# Patient Record
Sex: FEMALE | Race: OTHER | Employment: UNEMPLOYED | URBAN - METROPOLITAN AREA
[De-identification: names, ages, dates, MRNs, and addresses within clinical notes are randomized per-mention and may not be internally consistent; named-entity substitution may affect disease eponyms.]

---

## 2017-11-28 ENCOUNTER — HOSPITAL ENCOUNTER (EMERGENCY)
Age: 57
Discharge: HOME | End: 2017-11-28
Attending: STUDENT IN AN ORGANIZED HEALTH CARE EDUCATION/TRAINING PROGRAM
Payer: SUBSIDIZED

## 2017-11-28 ENCOUNTER — APPOINTMENT (OUTPATIENT)
Dept: GENERAL RADIOLOGY | Age: 57
End: 2017-11-28
Attending: STUDENT IN AN ORGANIZED HEALTH CARE EDUCATION/TRAINING PROGRAM
Payer: SUBSIDIZED

## 2017-11-28 VITALS
HEIGHT: 57 IN | RESPIRATION RATE: 16 BRPM | TEMPERATURE: 98.6 F | BODY MASS INDEX: 32.79 KG/M2 | DIASTOLIC BLOOD PRESSURE: 75 MMHG | HEART RATE: 75 BPM | WEIGHT: 152 LBS | SYSTOLIC BLOOD PRESSURE: 177 MMHG | OXYGEN SATURATION: 100 %

## 2017-11-28 DIAGNOSIS — S16.1XXA STRAIN OF NECK MUSCLE, INITIAL ENCOUNTER: Primary | ICD-10-CM

## 2017-11-28 LAB
ALBUMIN SERPL-MCNC: 3.1 G/DL (ref 3.5–5)
ALBUMIN/GLOB SERPL: 0.7 {RATIO} (ref 1.1–2.2)
ALP SERPL-CCNC: 51 U/L (ref 45–117)
ALT SERPL-CCNC: 38 U/L (ref 12–78)
ANION GAP SERPL CALC-SCNC: 8 MMOL/L (ref 5–15)
APPEARANCE UR: CLEAR
AST SERPL-CCNC: 27 U/L (ref 15–37)
BACTERIA URNS QL MICRO: NEGATIVE /HPF
BASOPHILS # BLD: 0.1 K/UL (ref 0–0.1)
BASOPHILS NFR BLD: 1 % (ref 0–1)
BILIRUB SERPL-MCNC: 0.4 MG/DL (ref 0.2–1)
BILIRUB UR QL: NEGATIVE
BUN SERPL-MCNC: 12 MG/DL (ref 6–20)
BUN/CREAT SERPL: 14 (ref 12–20)
CALCIUM SERPL-MCNC: 8.9 MG/DL (ref 8.5–10.1)
CHLORIDE SERPL-SCNC: 88 MMOL/L (ref 97–108)
CO2 SERPL-SCNC: 26 MMOL/L (ref 21–32)
COLOR UR: ABNORMAL
CREAT SERPL-MCNC: 0.86 MG/DL (ref 0.55–1.02)
EOSINOPHIL # BLD: 0.3 K/UL (ref 0–0.4)
EOSINOPHIL NFR BLD: 3 % (ref 0–7)
EPITH CASTS URNS QL MICRO: ABNORMAL /LPF
ERYTHROCYTE [DISTWIDTH] IN BLOOD BY AUTOMATED COUNT: 12.1 % (ref 11.5–14.5)
GLOBULIN SER CALC-MCNC: 4.3 G/DL (ref 2–4)
GLUCOSE SERPL-MCNC: 203 MG/DL (ref 65–100)
GLUCOSE UR STRIP.AUTO-MCNC: NEGATIVE MG/DL
HCT VFR BLD AUTO: 29.5 % (ref 35–47)
HGB BLD-MCNC: 10.5 G/DL (ref 11.5–16)
HGB UR QL STRIP: ABNORMAL
HYALINE CASTS URNS QL MICRO: ABNORMAL /LPF (ref 0–5)
KETONES UR QL STRIP.AUTO: NEGATIVE MG/DL
LEUKOCYTE ESTERASE UR QL STRIP.AUTO: NEGATIVE
LYMPHOCYTES # BLD: 3.8 K/UL (ref 0.8–3.5)
LYMPHOCYTES NFR BLD: 39 % (ref 12–49)
MCH RBC QN AUTO: 27.6 PG (ref 26–34)
MCHC RBC AUTO-ENTMCNC: 35.6 G/DL (ref 30–36.5)
MCV RBC AUTO: 77.6 FL (ref 80–99)
MONOCYTES # BLD: 0.7 K/UL (ref 0–1)
MONOCYTES NFR BLD: 7 % (ref 5–13)
NEUTS SEG # BLD: 5 K/UL (ref 1.8–8)
NEUTS SEG NFR BLD: 50 % (ref 32–75)
NITRITE UR QL STRIP.AUTO: NEGATIVE
PH UR STRIP: 6.5 [PH] (ref 5–8)
PLATELET # BLD AUTO: 300 K/UL (ref 150–400)
POTASSIUM SERPL-SCNC: 4.2 MMOL/L (ref 3.5–5.1)
PROT SERPL-MCNC: 7.4 G/DL (ref 6.4–8.2)
PROT UR STRIP-MCNC: 100 MG/DL
RBC # BLD AUTO: 3.8 M/UL (ref 3.8–5.2)
RBC #/AREA URNS HPF: ABNORMAL /HPF (ref 0–5)
SODIUM SERPL-SCNC: 122 MMOL/L (ref 136–145)
SP GR UR REFRACTOMETRY: 1.01 (ref 1–1.03)
UROBILINOGEN UR QL STRIP.AUTO: 1 EU/DL (ref 0.2–1)
WBC # BLD AUTO: 9.8 K/UL (ref 3.6–11)
WBC URNS QL MICRO: ABNORMAL /HPF (ref 0–4)

## 2017-11-28 PROCEDURE — 71020 XR CHEST PA LAT: CPT

## 2017-11-28 PROCEDURE — 96372 THER/PROPH/DIAG INJ SC/IM: CPT

## 2017-11-28 PROCEDURE — 81001 URINALYSIS AUTO W/SCOPE: CPT | Performed by: STUDENT IN AN ORGANIZED HEALTH CARE EDUCATION/TRAINING PROGRAM

## 2017-11-28 PROCEDURE — 85025 COMPLETE CBC W/AUTO DIFF WBC: CPT | Performed by: STUDENT IN AN ORGANIZED HEALTH CARE EDUCATION/TRAINING PROGRAM

## 2017-11-28 PROCEDURE — 99283 EMERGENCY DEPT VISIT LOW MDM: CPT

## 2017-11-28 PROCEDURE — 74011250636 HC RX REV CODE- 250/636: Performed by: STUDENT IN AN ORGANIZED HEALTH CARE EDUCATION/TRAINING PROGRAM

## 2017-11-28 PROCEDURE — 36415 COLL VENOUS BLD VENIPUNCTURE: CPT | Performed by: STUDENT IN AN ORGANIZED HEALTH CARE EDUCATION/TRAINING PROGRAM

## 2017-11-28 PROCEDURE — 80053 COMPREHEN METABOLIC PANEL: CPT | Performed by: STUDENT IN AN ORGANIZED HEALTH CARE EDUCATION/TRAINING PROGRAM

## 2017-11-28 RX ORDER — INSULIN ASPART 100 [IU]/ML
INJECTION, SOLUTION INTRAVENOUS; SUBCUTANEOUS
COMMUNITY
End: 2018-02-07

## 2017-11-28 RX ORDER — KETOROLAC TROMETHAMINE 30 MG/ML
30 INJECTION, SOLUTION INTRAMUSCULAR; INTRAVENOUS
Status: COMPLETED | OUTPATIENT
Start: 2017-11-28 | End: 2017-11-28

## 2017-11-28 RX ORDER — VALSARTAN AND HYDROCHLOROTHIAZIDE 160; 12.5 MG/1; MG/1
1 TABLET, FILM COATED ORAL DAILY
COMMUNITY
End: 2018-02-10

## 2017-11-28 RX ORDER — NAPROXEN 500 MG/1
500 TABLET ORAL 2 TIMES DAILY WITH MEALS
Qty: 20 TAB | Refills: 0 | Status: SHIPPED | OUTPATIENT
Start: 2017-11-28 | End: 2017-12-08

## 2017-11-28 RX ADMIN — KETOROLAC TROMETHAMINE 30 MG: 30 INJECTION, SOLUTION INTRAMUSCULAR at 13:08

## 2017-11-28 NOTE — DISCHARGE INSTRUCTIONS
Distensión del lissett: Instrucciones de cuidado - [ Neck Strain: Care Instructions ]  Instrucciones de cuidado    Usted delatorre tenido skyla distensión de los músculos y los ligamentos del lissett. Cualquier movimiento repentino y fuera de lo común puede distender el lissett. [place] con frecuencia ocurre en las caídas o accidentes automovilísticos, o mariya ciertos deportes. Las actividades diarias aggie trabajar con skyla computadora o dormir también pueden causar skyla distensión si lo obligan a tener el lissett en skyla posición incómoda por [employer]. Es común que el dolor de lissett empeore mariya sukhi o dos días después de skyla Faythe Silence, valentine luego debería empezar a mejorar. Brendan vez sienta más dolor y rigidez por varios días antes de que mejore. [place] es normal. Podría tardar unas cuantas semanas o más tiempo para sanar por completo. Un buen tratamiento en el hogar puede ayudarle a mejorar más rápido y evitar futuros problemas del lissett. La atención de seguimiento es skyla parte clave de lopez tratamiento y seguridad. Asegúrese de hacer y acudir a todas las citas, y llame a lopez médico si está teniendo problemas. También es skyla buena idea saber los resultados de los exámenes y mantener skyla lista de los medicamentos que tho. ¿Cómo puede cuidarse en el hogar? · Si le dieron un aparato ortopédico para el lissett (collarín) para limitar lopez movimiento, úselo mariya la cantidad de días que lopez médico le haya indicado. No lo use mariya más tiempo de lo que [employer] Usar un aparato ortopédico por demasiado tiempo puede empeorar la rigidez y debilitar los músculos del lissett. · Puede probar a usar calor o frío para gordo si ayuda. ¨ Pruebe a usar skyla almohadilla térmica a temperatura baja o media por entre 15 y 21 minutos cada 2 o 3 horas. Pruebe con [place] ducha tibia en vez de skyla sesión con la almohadilla térmica. También puede comprar envolturas calientes desechables que mckeon hasta 8 horas.   ¨ También puede probar con [place] compresa de hielo por entre 10 y 15 minutos cada 2 o 3 horas. · [employer] analgésicos (medicamentos para el dolor) exactamente según las indicaciones. ¨ Si el médico le recetó un analgésico, tómelo según las indicaciones. ¨ Si no está tomando un analgésico recetado, pregúntele a lopez médico si puede terry sukhi de [employer]. · Masajee la red con suavidad para aliviar el dolor y ayudar al flujo de [place]. No se masajee la red si al [employer]. · No tj nada que empeore el dolor. Tómelo con calma por un par de días. Puede hacer christelle actividades habituales si no le hacen doler el lissett ni lo ponen en riesgo de mayor estrés o lesiones. · Trate de dormir con skyla almohada especial para el lissett. Colóquela debajo del lissett, no debajo de la rachel. También funciona colocar skyla toalla firmemente enrollada debajo del lissett mientras duerme. [place] skyla almohada cervical o skyla toalla enrollada, no use lopez almohada normal al mismo tiempo. · Para prevenir el dolor de lissett en el futuro, tj ejercicios para estirar y fortalecer el lissett y la espalda. Aprenda a [place], técnicas seguras para levantar peso y la mecánica corporal apropiada. ¿Cuándo debe pedir ayuda? Llame al 911 en cualquier momento que considere que necesita atención de [place]. Por ejemplo, llame si:  ? · Es completamente incapaz de  un brazo o skyla pierna. ?Llame a lopez médico ahora mismo o busque atención médica inmediata si:  ? · Usted tiene síntomas nuevos o peores en los brazos, las piernas, el pecho, el abdomen o las nalgas (glúteos). Los síntomas pueden incluir:  ¨ Entumecimiento u hormigueo. ¨ Debilidad. ¨ Dolor. ? · Pierde el control de la vejiga o del intestino. ?Preste especial atención a los cambios en lopez rd y asegúrese de comunicarse con lopez médico si:  ? · No mejora aggie se esperaba. ¿Dónde puede encontrar más información en inglés? Payam Lyle a [email or web address].[email or web address]   Glory Kim M253 en la búsqueda para aprender más acerca de \"Distensión del lissett: Instrucciones de cuidado - [ Neck Strain: Care Instructions ]. \"  Revisado: 21 marzo, 2017  Versión del contenido: 11.4  © 4356-1701 Healthwise, Incorporated. Las instrucciones de cuidado fueron adaptadas bajo licencia por [employer] (which disclaims liability or warranty for this information). Si usted tiene [place] afección médica o sobre estas instrucciones, siempre pregunte a lopez profesional de rd. Healthwise, Incorporated niega toda garantía o responsabilidad por lopez uso de esta información.

## 2017-11-28 NOTE — ED TRIAGE NOTES
\"She has depression and she's nervous. She can't sleep and she has neck and back pain. \" Patient has hx of depression. Patient denies any suicidal ideation.

## 2017-11-28 NOTE — ED NOTES
Pt and family was educated on DC instructions per physician. Pt and family member stated understanding. PT was able to ambulate to lobby with the help of her family member. Family member stated they had all belongings.

## 2017-11-29 NOTE — ED PROVIDER NOTES
HPI Comments: Ms. Krishna Herrera is a 61 y/o female presenting to ED for neck pain, shoulder pain, and \"nerve\" pain. HPi is very difficult to understand even with use of interpretor as pt. Just immigrated to [facility] from [place]. There was confusion initially as she stated \"my nerves are depressed. \"  She denies any depression, mental health disorders, only has hx of IDDM. She has been having \"bilateral nerve pain. \"  Pt. Has been also complaining of neck and shoulder pain. She denies any trauma, does not have PCP. She denies fevers, chills, rashes, SOB, CP, abd pain, weight gain/loss, night sweats. Patient is a 62 y.o. female presenting with insomnia and back pain. The history is provided by the patient and a relative. The history is limited by a language barrier. A  was used. Insomnia    Pertinent negatives include no numbness. Back Pain    Pertinent negatives include no chest pain, no fever, no numbness, no headaches, no abdominal pain and no dysuria. Past Medical History:   Diagnosis Date    Diabetes ([address]     Hypertension        Past Surgical History:   Procedure Laterality Date    HX BREAST LUMPECTOMY Left          History reviewed. No pertinent family history. Social History     Social History    Marital status: SINGLE     Spouse name: N/A    Number of children: N/A    Years of education: N/A     Occupational History    Not on file. Social History Main Topics    Smoking status: Never Smoker    Smokeless tobacco: Never Used    Alcohol use No    Drug use: No    Sexual activity: Not on file     Other Topics Concern    Not on file     Social History Narrative    No narrative on file         ALLERGIES: Review of patient's allergies indicates no known allergies. Review of Systems   Constitutional: Negative for activity change, diaphoresis, fatigue and fever. HENT: Negative for congestion and sore throat. Eyes: Negative for photophobia and visual disturbance. Respiratory: Negative for chest tightness and shortness of breath. Cardiovascular: Negative for chest pain, palpitations and leg swelling. Gastrointestinal: Negative for abdominal pain, blood in stool, constipation, diarrhea, nausea and vomiting. Genitourinary: Negative for difficulty urinating, dysuria, flank pain, frequency and hematuria. Musculoskeletal: Positive for back pain. Neurological: Negative for dizziness, syncope, numbness and headaches. Psychiatric/Behavioral: The patient has insomnia. Vitals:    11/28/17 1214 11/28/17 1248 11/28/17 1433 11/28/17 1434   BP: 171/82 189/84  177/75   Pulse: 82  75    Resp: 18  16    Temp: 98.6 °F (37 °C)      SpO2: 97% 99% 100%    Weight: 68.9 kg (152 lb)      Height: 4' 9.09\" (1.45 m)               Physical Exam   Constitutional: She is oriented to person, place, and time. She appears well-developed and well-nourished. No distress. HENT:   Head: Normocephalic and atraumatic. Nose: Nose normal.   Mouth/Throat: Oropharynx is clear and moist. No oropharyngeal exudate. Eyes: Conjunctivae and EOM are normal. Right eye exhibits no discharge. Left eye exhibits no discharge. No scleral icterus. Neck: Normal range of motion. Neck supple. No JVD present. No tracheal deviation present. No thyromegaly present. Cardiovascular: Normal rate, regular rhythm, normal heart sounds and intact distal pulses. Exam reveals no gallop and no friction rub. No murmur heard. Pulmonary/Chest: Effort normal and breath sounds normal. No stridor. No respiratory distress. She has no wheezes. She has no rales. She exhibits no tenderness. Abdominal: Bowel sounds are normal. She exhibits no distension and no mass. There is no tenderness. There is no rebound. Musculoskeletal: Normal range of motion. She exhibits tenderness. She exhibits no edema. Trapezius and bilateral para cervical tenderness. Lymphadenopathy:     She has no cervical adenopathy.    Neurological: She is alert and oriented to person, place, and time. No cranial nerve deficit. Coordination normal.   Skin: Skin is warm and dry. No rash noted. She is not diaphoretic. No erythema. No pallor. Psychiatric: She has a normal mood and affect. Her behavior is normal. Judgment and thought content normal.   Nursing note and vitals reviewed. MDM  Number of Diagnoses or Management Options  Strain of neck muscle, initial encounter:      Amount and/or Complexity of Data Reviewed  Clinical lab tests: ordered and reviewed  Tests in the radiology section of CPT®: ordered and reviewed  Obtain history from someone other than the patient: yes    Risk of Complications, Morbidity, and/or Mortality  Presenting problems: moderate  Diagnostic procedures: moderate  Management options: moderate    Patient Progress  Patient progress: stable    ED Course       Procedures    4:56 PM  The patient has been reevaluated. The patient is ready for discharge. The patient's signs, symptoms, diagnosis, and discharge instructions have been discussed and the patient/ family has conveyed their understanding. The patient is to follow up as recommended or return to the ED should their symptoms worsen. Plan has been discussed and the patient is in agreement. LABORATORY TESTS:  No results found for this or any previous visit (from the past 12 hour(s)). IMAGING RESULTS:  XR CHEST PA LAT   Final Result        No results found. MEDICATIONS GIVEN:  Medications   ketorolac (TORADOL) injection 30 mg (30 mg IntraMUSCular Given 11/28/17 1308)       IMPRESSION:  1. Strain of neck muscle, initial encounter        PLAN:  1. Discharge Medication List as of 11/28/2017  2:30 PM      START taking these medications    Details   naproxen (NAPROSYN) 500 mg tablet Take 1 Tab by mouth two (2) times daily (with meals) for 10 days. , Print, Disp-20 Tab, R-0         CONTINUE these medications which have NOT CHANGED    Details   insulin aspart (NOVOLOG) 100 unit/mL injection by SubCUTAneous route Before breakfast, lunch, dinner and at bedtime. , Historical Med      valsartan-hydroCHLOROthiazide (DIOVAN-HCT) 160-12.5 mg per tablet Take 1 Tab by mouth daily. , Historical Med           2.    Follow-up Information     Follow up With Details Comments Contact Info    [facility] EMERGENCY DEPT  If symptoms worsen [address] [facility]  [phone]            Return to ED for new or worsening symptoms       Sara Coffman MD

## 2018-02-07 ENCOUNTER — HOSPITAL ENCOUNTER (INPATIENT)
Age: 58
LOS: 3 days | Discharge: HOME | DRG: 645 | End: 2018-02-10
Attending: EMERGENCY MEDICINE | Admitting: INTERNAL MEDICINE
Payer: SUBSIDIZED

## 2018-02-07 ENCOUNTER — APPOINTMENT (OUTPATIENT)
Dept: CT IMAGING | Age: 58
DRG: 645 | End: 2018-02-07
Attending: INTERNAL MEDICINE
Payer: SUBSIDIZED

## 2018-02-07 ENCOUNTER — APPOINTMENT (OUTPATIENT)
Dept: GENERAL RADIOLOGY | Age: 58
DRG: 645 | End: 2018-02-07
Attending: INTERNAL MEDICINE
Payer: SUBSIDIZED

## 2018-02-07 DIAGNOSIS — E87.1 HYPONATREMIA: Primary | ICD-10-CM

## 2018-02-07 PROBLEM — I10 HTN (HYPERTENSION), BENIGN: Status: ACTIVE | Noted: 2018-02-07

## 2018-02-07 PROBLEM — M54.2 NECK PAIN: Status: ACTIVE | Noted: 2018-02-07

## 2018-02-07 PROBLEM — E11.9 TYPE 2 DIABETES MELLITUS WITHOUT COMPLICATION (HCC): Status: ACTIVE | Noted: 2018-02-07

## 2018-02-07 PROBLEM — D72.829 LEUKOCYTOSIS: Status: ACTIVE | Noted: 2018-02-07

## 2018-02-07 PROBLEM — R51.9 HEADACHE: Status: ACTIVE | Noted: 2018-02-07

## 2018-02-07 LAB
ALBUMIN SERPL-MCNC: 3.2 G/DL (ref 3.5–5)
ALBUMIN SERPL-MCNC: 3.3 G/DL (ref 3.5–5)
ALBUMIN/GLOB SERPL: 0.7 {RATIO} (ref 1.1–2.2)
ALP SERPL-CCNC: 63 U/L (ref 45–117)
ALT SERPL-CCNC: 41 U/L (ref 12–78)
ANION GAP SERPL CALC-SCNC: 11 MMOL/L (ref 5–15)
ANION GAP SERPL CALC-SCNC: 11 MMOL/L (ref 5–15)
ANION GAP SERPL CALC-SCNC: 7 MMOL/L (ref 5–15)
ANION GAP SERPL CALC-SCNC: 8 MMOL/L (ref 5–15)
APPEARANCE UR: CLEAR
AST SERPL-CCNC: 32 U/L (ref 15–37)
ATRIAL RATE: 80 BPM
BACTERIA URNS QL MICRO: NEGATIVE /HPF
BASOPHILS # BLD: 0.1 K/UL (ref 0–0.1)
BASOPHILS NFR BLD: 1 % (ref 0–1)
BILIRUB SERPL-MCNC: 0.4 MG/DL (ref 0.2–1)
BILIRUB UR QL: NEGATIVE
BNP SERPL-MCNC: 92 PG/ML (ref 0–125)
BUN SERPL-MCNC: 13 MG/DL (ref 6–20)
BUN SERPL-MCNC: 14 MG/DL (ref 6–20)
BUN SERPL-MCNC: 14 MG/DL (ref 6–20)
BUN SERPL-MCNC: 15 MG/DL (ref 6–20)
BUN/CREAT SERPL: 17 (ref 12–20)
BUN/CREAT SERPL: 17 (ref 12–20)
BUN/CREAT SERPL: 18 (ref 12–20)
BUN/CREAT SERPL: 19 (ref 12–20)
CALCIUM SERPL-MCNC: 8.5 MG/DL (ref 8.5–10.1)
CALCIUM SERPL-MCNC: 8.9 MG/DL (ref 8.5–10.1)
CALCULATED P AXIS, ECG09: 47 DEGREES
CALCULATED R AXIS, ECG10: 11 DEGREES
CALCULATED T AXIS, ECG11: 41 DEGREES
CHLORIDE SERPL-SCNC: 82 MMOL/L (ref 97–108)
CHLORIDE SERPL-SCNC: 87 MMOL/L (ref 97–108)
CK MB CFR SERPL CALC: 1 % (ref 0–2.5)
CK MB SERPL-MCNC: 2.9 NG/ML (ref 5–25)
CK SERPL-CCNC: 288 U/L (ref 26–192)
CO2 SERPL-SCNC: 21 MMOL/L (ref 21–32)
CO2 SERPL-SCNC: 23 MMOL/L (ref 21–32)
CO2 SERPL-SCNC: 26 MMOL/L (ref 21–32)
CO2 SERPL-SCNC: 27 MMOL/L (ref 21–32)
COLOR UR: ABNORMAL
CORTIS SERPL-MCNC: 22.5 UG/DL
CREAT SERPL-MCNC: 0.77 MG/DL (ref 0.55–1.02)
CREAT SERPL-MCNC: 0.78 MG/DL (ref 0.55–1.02)
CREAT SERPL-MCNC: 0.79 MG/DL (ref 0.55–1.02)
CREAT SERPL-MCNC: 0.83 MG/DL (ref 0.55–1.02)
CREAT UR-MCNC: 18.99 MG/DL
DIAGNOSIS, 93000: NORMAL
DIFFERENTIAL METHOD BLD: ABNORMAL
EOSINOPHIL # BLD: 0.8 K/UL (ref 0–0.4)
EOSINOPHIL NFR BLD: 6 % (ref 0–7)
EPITH CASTS URNS QL MICRO: ABNORMAL /LPF
ERYTHROCYTE [DISTWIDTH] IN BLOOD BY AUTOMATED COUNT: 11.7 % (ref 11.5–14.5)
EST. AVERAGE GLUCOSE BLD GHB EST-MCNC: 166 MG/DL
GLOBULIN SER CALC-MCNC: 4.3 G/DL (ref 2–4)
GLUCOSE BLD STRIP.AUTO-MCNC: 133 MG/DL (ref 65–100)
GLUCOSE BLD STRIP.AUTO-MCNC: 142 MG/DL (ref 65–100)
GLUCOSE SERPL-MCNC: 117 MG/DL (ref 65–100)
GLUCOSE SERPL-MCNC: 118 MG/DL (ref 65–100)
GLUCOSE SERPL-MCNC: 126 MG/DL (ref 65–100)
GLUCOSE SERPL-MCNC: 217 MG/DL (ref 65–100)
GLUCOSE UR STRIP.AUTO-MCNC: NEGATIVE MG/DL
HBA1C MFR BLD: 7.4 % (ref 4.2–6.3)
HCT VFR BLD AUTO: 31.4 % (ref 35–47)
HGB BLD-MCNC: 11.5 G/DL (ref 11.5–16)
HGB UR QL STRIP: NEGATIVE
HYALINE CASTS URNS QL MICRO: ABNORMAL /LPF (ref 0–5)
IMM GRANULOCYTES # BLD: 0.1 K/UL (ref 0–0.04)
IMM GRANULOCYTES NFR BLD AUTO: 1 % (ref 0–0.5)
KETONES UR QL STRIP.AUTO: NEGATIVE MG/DL
LACTATE SERPL-SCNC: 0.6 MMOL/L (ref 0.4–2)
LEUKOCYTE ESTERASE UR QL STRIP.AUTO: NEGATIVE
LIPASE SERPL-CCNC: 151 U/L (ref 73–393)
LYMPHOCYTES # BLD: 3 K/UL (ref 0.8–3.5)
LYMPHOCYTES NFR BLD: 24 % (ref 12–49)
MCH RBC QN AUTO: 28.9 PG (ref 26–34)
MCHC RBC AUTO-ENTMCNC: 36.6 G/DL (ref 30–36.5)
MCV RBC AUTO: 78.9 FL (ref 80–99)
MONOCYTES # BLD: 0.8 K/UL (ref 0–1)
MONOCYTES NFR BLD: 6 % (ref 5–13)
NEUTS SEG # BLD: 7.7 K/UL (ref 1.8–8)
NEUTS SEG NFR BLD: 62 % (ref 32–75)
NITRITE UR QL STRIP.AUTO: NEGATIVE
NRBC # BLD: 0 K/UL (ref 0–0.01)
NRBC BLD-RTO: 0 PER 100 WBC
OSMOLALITY SERPL: 260 MOSM/KG H2O
OSMOLALITY UR: 192 MOSM/KG H2O
OSMOLALITY UR: 214 MOSM/KG H2O
P-R INTERVAL, ECG05: 176 MS
PH UR STRIP: 7.5 [PH] (ref 5–8)
PHOSPHATE SERPL-MCNC: 4.5 MG/DL (ref 2.6–4.7)
PLATELET # BLD AUTO: 368 K/UL (ref 150–400)
PMV BLD AUTO: 8.7 FL (ref 8.9–12.9)
POTASSIUM SERPL-SCNC: 3.8 MMOL/L (ref 3.5–5.1)
POTASSIUM SERPL-SCNC: 3.9 MMOL/L (ref 3.5–5.1)
POTASSIUM SERPL-SCNC: 3.9 MMOL/L (ref 3.5–5.1)
POTASSIUM SERPL-SCNC: 4 MMOL/L (ref 3.5–5.1)
PROT SERPL-MCNC: 7.5 G/DL (ref 6.4–8.2)
PROT UR STRIP-MCNC: 100 MG/DL
Q-T INTERVAL, ECG07: 398 MS
QRS DURATION, ECG06: 80 MS
QTC CALCULATION (BEZET), ECG08: 459 MS
RBC # BLD AUTO: 3.98 M/UL (ref 3.8–5.2)
RBC #/AREA URNS HPF: ABNORMAL /HPF (ref 0–5)
SERVICE CMNT-IMP: ABNORMAL
SERVICE CMNT-IMP: ABNORMAL
SODIUM SERPL-SCNC: 116 MMOL/L (ref 136–145)
SODIUM SERPL-SCNC: 119 MMOL/L (ref 136–145)
SODIUM SERPL-SCNC: 121 MMOL/L (ref 136–145)
SODIUM SERPL-SCNC: 121 MMOL/L (ref 136–145)
SODIUM UR-SCNC: 29 MMOL/L
SP GR UR REFRACTOMETRY: 1.01 (ref 1–1.03)
TROPONIN I SERPL-MCNC: <0.04 NG/ML
TSH SERPL DL<=0.05 MIU/L-ACNC: 2.96 UIU/ML (ref 0.36–3.74)
UR CULT HOLD, URHOLD: NORMAL
UROBILINOGEN UR QL STRIP.AUTO: 0.2 EU/DL (ref 0.2–1)
VENTRICULAR RATE, ECG03: 80 BPM
WBC # BLD AUTO: 12.4 K/UL (ref 3.6–11)
WBC URNS QL MICRO: ABNORMAL /HPF (ref 0–4)

## 2018-02-07 PROCEDURE — 70450 CT HEAD/BRAIN W/O DYE: CPT

## 2018-02-07 PROCEDURE — 74011250636 HC RX REV CODE- 250/636: Performed by: EMERGENCY MEDICINE

## 2018-02-07 PROCEDURE — 83880 ASSAY OF NATRIURETIC PEPTIDE: CPT | Performed by: EMERGENCY MEDICINE

## 2018-02-07 PROCEDURE — 74011250636 HC RX REV CODE- 250/636: Performed by: INTERNAL MEDICINE

## 2018-02-07 PROCEDURE — 84484 ASSAY OF TROPONIN QUANT: CPT | Performed by: EMERGENCY MEDICINE

## 2018-02-07 PROCEDURE — 85025 COMPLETE CBC W/AUTO DIFF WBC: CPT | Performed by: EMERGENCY MEDICINE

## 2018-02-07 PROCEDURE — 94762 N-INVAS EAR/PLS OXIMTRY CONT: CPT

## 2018-02-07 PROCEDURE — 83036 HEMOGLOBIN GLYCOSYLATED A1C: CPT | Performed by: INTERNAL MEDICINE

## 2018-02-07 PROCEDURE — 83935 ASSAY OF URINE OSMOLALITY: CPT | Performed by: INTERNAL MEDICINE

## 2018-02-07 PROCEDURE — 96374 THER/PROPH/DIAG INJ IV PUSH: CPT

## 2018-02-07 PROCEDURE — 82550 ASSAY OF CK (CPK): CPT | Performed by: EMERGENCY MEDICINE

## 2018-02-07 PROCEDURE — 83690 ASSAY OF LIPASE: CPT | Performed by: EMERGENCY MEDICINE

## 2018-02-07 PROCEDURE — 36415 COLL VENOUS BLD VENIPUNCTURE: CPT | Performed by: INTERNAL MEDICINE

## 2018-02-07 PROCEDURE — 84300 ASSAY OF URINE SODIUM: CPT | Performed by: INTERNAL MEDICINE

## 2018-02-07 PROCEDURE — 80069 RENAL FUNCTION PANEL: CPT | Performed by: INTERNAL MEDICINE

## 2018-02-07 PROCEDURE — 80048 BASIC METABOLIC PNL TOTAL CA: CPT | Performed by: INTERNAL MEDICINE

## 2018-02-07 PROCEDURE — 83605 ASSAY OF LACTIC ACID: CPT | Performed by: INTERNAL MEDICINE

## 2018-02-07 PROCEDURE — 82533 TOTAL CORTISOL: CPT | Performed by: INTERNAL MEDICINE

## 2018-02-07 PROCEDURE — 82962 GLUCOSE BLOOD TEST: CPT

## 2018-02-07 PROCEDURE — 87086 URINE CULTURE/COLONY COUNT: CPT | Performed by: INTERNAL MEDICINE

## 2018-02-07 PROCEDURE — 80053 COMPREHEN METABOLIC PANEL: CPT | Performed by: EMERGENCY MEDICINE

## 2018-02-07 PROCEDURE — 99285 EMERGENCY DEPT VISIT HI MDM: CPT

## 2018-02-07 PROCEDURE — 93005 ELECTROCARDIOGRAM TRACING: CPT

## 2018-02-07 PROCEDURE — 83930 ASSAY OF BLOOD OSMOLALITY: CPT | Performed by: INTERNAL MEDICINE

## 2018-02-07 PROCEDURE — 82570 ASSAY OF URINE CREATININE: CPT | Performed by: INTERNAL MEDICINE

## 2018-02-07 PROCEDURE — 84443 ASSAY THYROID STIM HORMONE: CPT | Performed by: INTERNAL MEDICINE

## 2018-02-07 PROCEDURE — 81001 URINALYSIS AUTO W/SCOPE: CPT | Performed by: EMERGENCY MEDICINE

## 2018-02-07 PROCEDURE — 96361 HYDRATE IV INFUSION ADD-ON: CPT

## 2018-02-07 PROCEDURE — 65270000029 HC RM PRIVATE

## 2018-02-07 PROCEDURE — 74011636637 HC RX REV CODE- 636/637: Performed by: INTERNAL MEDICINE

## 2018-02-07 RX ORDER — DIPHENHYDRAMINE HYDROCHLORIDE 50 MG/ML
12.5 INJECTION, SOLUTION INTRAMUSCULAR; INTRAVENOUS
Status: DISCONTINUED | OUTPATIENT
Start: 2018-02-07 | End: 2018-02-10 | Stop reason: HOSPADM

## 2018-02-07 RX ORDER — KETOROLAC TROMETHAMINE 30 MG/ML
15 INJECTION, SOLUTION INTRAMUSCULAR; INTRAVENOUS
Status: COMPLETED | OUTPATIENT
Start: 2018-02-07 | End: 2018-02-07

## 2018-02-07 RX ORDER — MORPHINE SULFATE 2 MG/ML
2 INJECTION, SOLUTION INTRAMUSCULAR; INTRAVENOUS
Status: DISCONTINUED | OUTPATIENT
Start: 2018-02-07 | End: 2018-02-10 | Stop reason: HOSPADM

## 2018-02-07 RX ORDER — HYDRALAZINE HYDROCHLORIDE 20 MG/ML
10 INJECTION INTRAMUSCULAR; INTRAVENOUS
Status: DISCONTINUED | OUTPATIENT
Start: 2018-02-07 | End: 2018-02-10 | Stop reason: HOSPADM

## 2018-02-07 RX ORDER — SODIUM CHLORIDE 0.9 % (FLUSH) 0.9 %
5-10 SYRINGE (ML) INJECTION AS NEEDED
Status: DISCONTINUED | OUTPATIENT
Start: 2018-02-07 | End: 2018-02-10 | Stop reason: HOSPADM

## 2018-02-07 RX ORDER — ACETAMINOPHEN 325 MG/1
650 TABLET ORAL
Status: DISCONTINUED | OUTPATIENT
Start: 2018-02-07 | End: 2018-02-10 | Stop reason: HOSPADM

## 2018-02-07 RX ORDER — INSULIN LISPRO 100 [IU]/ML
INJECTION, SOLUTION INTRAVENOUS; SUBCUTANEOUS
Status: DISCONTINUED | OUTPATIENT
Start: 2018-02-07 | End: 2018-02-10 | Stop reason: HOSPADM

## 2018-02-07 RX ORDER — ENOXAPARIN SODIUM 100 MG/ML
40 INJECTION SUBCUTANEOUS EVERY 24 HOURS
Status: DISCONTINUED | OUTPATIENT
Start: 2018-02-07 | End: 2018-02-07

## 2018-02-07 RX ORDER — MAGNESIUM SULFATE 100 %
4 CRYSTALS MISCELLANEOUS AS NEEDED
Status: DISCONTINUED | OUTPATIENT
Start: 2018-02-07 | End: 2018-02-10 | Stop reason: HOSPADM

## 2018-02-07 RX ORDER — DEXTROSE 50 % IN WATER (D50W) INTRAVENOUS SYRINGE
12.5-25 AS NEEDED
Status: DISCONTINUED | OUTPATIENT
Start: 2018-02-07 | End: 2018-02-10 | Stop reason: HOSPADM

## 2018-02-07 RX ORDER — SODIUM CHLORIDE 0.9 % (FLUSH) 0.9 %
5-10 SYRINGE (ML) INJECTION EVERY 8 HOURS
Status: DISCONTINUED | OUTPATIENT
Start: 2018-02-07 | End: 2018-02-10 | Stop reason: HOSPADM

## 2018-02-07 RX ORDER — PROCHLORPERAZINE EDISYLATE 5 MG/ML
5 INJECTION INTRAMUSCULAR; INTRAVENOUS
Status: DISCONTINUED | OUTPATIENT
Start: 2018-02-07 | End: 2018-02-10 | Stop reason: HOSPADM

## 2018-02-07 RX ORDER — NALOXONE HYDROCHLORIDE 0.4 MG/ML
0.4 INJECTION, SOLUTION INTRAMUSCULAR; INTRAVENOUS; SUBCUTANEOUS AS NEEDED
Status: DISCONTINUED | OUTPATIENT
Start: 2018-02-07 | End: 2018-02-10 | Stop reason: HOSPADM

## 2018-02-07 RX ORDER — SODIUM CHLORIDE 9 MG/ML
100 INJECTION, SOLUTION INTRAVENOUS CONTINUOUS
Status: DISCONTINUED | OUTPATIENT
Start: 2018-02-07 | End: 2018-02-07

## 2018-02-07 RX ADMIN — SODIUM CHLORIDE 100 ML/HR: 900 INJECTION, SOLUTION INTRAVENOUS at 15:04

## 2018-02-07 RX ADMIN — KETOROLAC TROMETHAMINE 15 MG: 30 INJECTION, SOLUTION INTRAMUSCULAR at 12:15

## 2018-02-07 RX ADMIN — SODIUM CHLORIDE 1000 ML: 900 INJECTION, SOLUTION INTRAVENOUS at 12:14

## 2018-02-07 RX ADMIN — HUMAN INSULIN 25 UNITS: 100 INJECTION, SUSPENSION SUBCUTANEOUS at 17:34

## 2018-02-07 NOTE — ROUTINE PROCESS
Primary Nurse Korin Logn and Angeles Saldaña, RN performed a dual skin assessment on this patient No impairment noted  Michael score is 20

## 2018-02-07 NOTE — PROGRESS NOTES
Dictated  Nausea + thiazide may likely contribute    Na was low last fall? ??    - tsh cortisol, urine lytes+osm, stat renal labs    May need another cxr - was normal in NOV17 when Na 120s    May need 3% infusion    Call chems just ordered then q6h

## 2018-02-07 NOTE — ED TRIAGE NOTES
Pt arrives via EMS for 2-3 days of headache, abdominal pain, and back pain. Pt states also with urgency, nasal congestion, and post nasal drip.  Pt states has been taking Tylenol with limited relief last dose was at 0001am.

## 2018-02-07 NOTE — H&P
[address]  [address] [address]  [phone]    Admission History and Physical      NAME:              Charis Guerrero   :   1960   MRN:  [id number]     PCP:  None     Date:     2018     Chief  Complaint: Headaches, neck pain, weakness, fever    History Of Presenting Illness:       Ms. Riddhi Moreno is a 62 y.o. female who is being admitted for severe hyponatremia as well as severe headaches. Ms. Riddhi Moreno only speaks Korean and I have discussed with her through the "[employer] phone . She presented to our Emergency Department today complaining of a moderately severe, mainly frontal headaches associated with nausea, neck pain and subjective fever. She has some photophobia. She denies any preceeding flu like symptoms. She has been having nausea but no vomiting. She denies any diarrhea or urinary symptoms. No cough or SOB. She has a hx of HTN and has been taking Diovan HCT and she was found to be severely hyponatremic. The patient will be admitted to hospital for further management. No Known Allergies    Prior to Admission medications    Medication Sig Start Date End Date Taking? Authorizing Provider   insulin NPH (NOVOLIN N) 100 unit/mL injection by SubCUTAneous route once. Yes Vandana Gonzales MD   OTHER Indications: Medication for \"nerves- to be able to sleep\"   Yes Vandana Gonzales MD   valsartan-hydroCHLOROthiazide (DIOVAN-HCT) 160-12.5 mg per tablet Take 1 Tab by mouth daily.     Vandana Gonzales MD       Past Medical History:   Diagnosis Date    Diabetes ([address]     GERD (gastroesophageal reflux disease)     Hypertension         Past Surgical History:   Procedure Laterality Date    HX BREAST LUMPECTOMY Left        Social History   Substance Use Topics    Smoking status: Never Smoker    Smokeless tobacco: Never Used    Alcohol use No Family History   Problem Relation Age of Onset    Diabetes Brother         Review of Systems:    Constitutional ROS: subjective fever, chills, rigors but no night sweats  Respiratory ROS: no cough, sputum, hemoptysis, dyspnea or pleuritic pain. Cardiovascular ROS: no chest pain, palpitations, orthopnea, PND or syncope  Endocrine ROS: no polydispsia, polyuria, heat or cold intolerance or major weight change. Gastrointestinal ROS: no dysphagia, abdominal pain, nausea, vomiting or diarrhea    Genito-Urinary ROS: no dysuria, frequency, hematuria, retention or flank pain  Musculoskeletal ROS: no joint pain, swelling or muscular tenderness  Neurological ROS: + headache, + neck pain and no confusion, focal weakness or any other neurological symptoms  Psychiatric ROS: no depression, anxiety, mood swings  Dermatological ROS: no rash, pruritis, or urticaria  Heme-Lymph ROS: no swollen glands, bleeding    Examination:    Constitutional:    Visit Vitals    /73 (BP 1 Location: Right arm, BP Patient Position: At rest)    Pulse 72    Temp 98 °F (36.7 °C)    Resp 16    Ht 5' 1\" (1.549 m)    Wt 68.9 kg (152 lb)    SpO2 100%    BMI 28.72 kg/m2         General:  Weak and ill looking patient who appears uncomfortable from headaches. Eyes: Pink conjunctivae, PERRLA with no discharge. Normal eye movements  Ear, Nose, Mouth & Throat: No ottorrhea, rhinorrhea, non tender sinuses, dry mucous membranes  Respiratory:  No accessory muscle use, clear breath sounds without crackles or wheezes  Cardiovascular:  No JVD or murmurs, regular and normal S1, S2 without thrills, bruits or peripheral edema. Capillary refil+, good distal pulses  GI & :  Soft abdomen, non-tender, non-distended, normoactive bowel sounds with no palpable organomegaly  Heme:  No cervical or axillary adenopathy.    Musculoskeletal:  No cyanosis, clubbing, atrophy or deformities  Skin:  No rashes, bruising or ulcers   Neurological: Awake and alert, speech is clear, Neck discomfort, CNs 2-12 are grossly intact and otherwise non focal  Psychiatric:  Has a fair insight and is oriented x 3  ________________________________________________________________________    Data Review:    Labs:    Recent Labs      02/07/18   1141   WBC  12.4*   HGB  11.5   HCT  31.4*   PLT  368     Recent Labs      02/07/18   1141   NA  116*   K  3.9   CL  82*   CO2  23   GLU  217*   BUN  14   CREA  0.83   CA  8.9   ALB  3.2*   SGOT  32   ALT  41     No components found for: GLPOC  No results for input(s): PH, PCO2, PO2, HCO3, FIO2 in the last 72 hours. No results for input(s): INR in the last 72 hours. No lab exists for component: INREXT    Radiological Studies:      Chest X-ray - normal    Other Medical tests:    Personally reviewed 12 lead EKG: Normal rate, rhythm, axis and intervals. and No acute changes suggestive of ischemia    I have also reviewed available old medical records. Assessment & Impression:     Ms. Laury Coronado is a 62 y.o. female being evaluated for:     Active Problems:    Hyponatremia (2/7/2018)      Headache (2/7/2018)      Neck pain (2/7/2018)      HTN (hypertension), benign (2/7/2018)      Type 2 diabetes mellitus without complication ([address] (9/8/8407)      Leukocytosis (2/7/2018)         Plan of management:    Hyponatremia (2/7/2018): severe. Likely due to HCT use. Admit to hospital. Obtain urine lytes. Serum and urine osmolarity. Get a TSH. Hold HCT. Start gentle hydration. Monitor BMP q4hrs. Consult nephrology    Headache (2/7/2018)/ Neck pain (2/7/2018): headache concerning. Although likely related to the hyponatremia, her intermittent fever raises concern for meningitis. Obtain a head CT scan and an LP    HTN (hypertension), benign (2/7/2018): BP stable. Hold home medications    Type 2 diabetes mellitus without complication ([address] (7/9/8628): check A1c. Start SSI per protocol, diabetic diet and home insulin    Leukocytosis (2/7/2018): unclear etiology.  Chest Xray and UA unremarkable. Work up as above     Addendum: patient declined to have an LP. I have discussed with her as well as her daughter. During this discussion, she now says she has no headache or neck discomfort. Will hold off LP for now and follow.      Code Status:  Full    Surrogate decision maker: Family (Daughter)    Risk of deterioration: high      Total time spent for the care of the patient: 79 [address] [facility] discussed with: Patient, Family, Nursing Staff and ED physician    Discussed:  Code Status, Care Plan and D/C Planning    Prophylaxis:  Lovenox    Probable Disposition:  Home w/Family           ___________________________________________________    Attending Physician: Renato Alexander MD

## 2018-02-07 NOTE — ROUTINE PROCESS
Patient refused LP, wanting to talk to Dr. Ansari Gone more about the test.    [phone]  Dr. Ansari Gone in to speak with patient using blue phone.

## 2018-02-07 NOTE — IP AVS SNAPSHOT
[address] [facility] [address] 
 
 
 [address] [facility] [address] [facility] 
[phone] Patient: Fernando Amaya MRN: [id number] XRL:[birth year] About your hospitalization You were admitted on:  February 7, 2018 You last received care in the:  [facility] 5M1 MED SURG 1 You were discharged on:  February 10, 2018 Why you were hospitalized Your primary diagnosis was:  Not on File Your diagnoses also included:  Hyponatremia, Headache, Neck Pain, Htn (Hypertension), Benign, Type 2 Diabetes Mellitus Without Complication (Hcc), Leukocytosis, Hypotension Follow-up Information Follow up With Details Comments Contact Info Elizabeth Pyle MD   [place] [address] [address] [facility] 
[phone] None   None (395) Patient stated that they have no PCP Discharge Orders None A check pablo indicates which time of day the medication should be taken. My Medications START taking these medications Instructions Each Dose to Equal  
 Morning Noon Evening Bedtime  
 gabapentin 100 mg capsule Commonly known as:  NEURONTIN Your last dose was: Your next dose is: Take 1 Cap by mouth two (2) times a day for 30 days. 100 mg CONTINUE taking these medications Instructions Each Dose to Equal  
 Morning Noon Evening Bedtime NovoLIN N 100 unit/mL injection Generic drug:  insulin NPH Your last dose was: Your next dose is:    
   
   
 30 Units by SubCUTAneous route Before breakfast and dinner. 30 Units STOP taking these medications   
 valsartan-hydroCHLOROthiazide 160-12.5 mg per tablet Commonly known as:  DIOVAN-HCT Where to Get Your Medications Information on where to get these meds will be given to you by the nurse or doctor. ! Ask your nurse or doctor about these medications  
  gabapentin 100 mg capsule Discharge Instructions Patient Discharge Instructions Kiley Hannah / [id number] : 1960 Admitted 2018 Discharged: 2/10/2018 Primary Diagnoses Problem List as of 2/10/2018  Date Reviewed: 2018 Codes Class Noted - Resolved Hypotension Hyponatremia Headache Neck pain HTN (hypertension), benign Type 2 diabetes mellitus without complication (HCC) Leukocytosis Take Home Medications · It is important that you take the medication exactly as they are prescribed. · Keep your medication in the bottles provided by the pharmacist and keep a list of the medication names, dosages, and times to be taken in your wallet. · Do not take other medications without consulting your doctor. What to do at [facility] Recommended diet: Diabetic Diet and high salt, and limited fluid intake. Recommended activity: Activity as tolerated If you experience worse symptoms, please follow up with any PCP. Follow-up with Dr Angelito Garza in a few weeks Information obtained by : 
I understand that if any problems occur once I am at home I am to contact my physician. I understand and acknowledge receipt of the instructions indicated above. Physician's or R.N.'s Signature                                                                  Date/Time Patient or Representative Signature                                                          Date/Time [employer] Announcement We are excited to announce that we are making your provider's discharge notes available to you in [employer].   You will see these notes when they are completed and signed by the physician that discharged you from your recent hospital stay. If you have any questions or concerns about any information you see in Nutrigreen, please call the Health Information Department where you were seen or reach out to your Primary Care Provider for more information about your plan of care. Introducing [facility]! Elver Ureña introduces Nutrigreen patient portal. Now you can access parts of your medical record, email your doctor's office, and request medication refills online. 1. In your internet browser, go to [email or web address] 2. Click on the First Time User? Click Here link in the Sign In box. You will see the New Member Sign Up page. 3. Enter your Nutrigreen Access Code exactly as it appears below. You will not need to use this code after youve completed the sign-up process. If you do not sign up before the expiration date, you must request a new code. · Nutrigreen Access Code: [email or web address] Expires: 2/26/2018  2:30 PM 
 
4. Enter the last four digits of your Social Security Number (xxxx) and Date of Birth (mm/dd/yyyy) as indicated and click Submit. You will be taken to the next sign-up page. 5. Create a Nutrigreen ID. This will be your Nutrigreen login ID and cannot be changed, so think of one that is secure and easy to remember. 6. Create a Nutrigreen password. You can change your password at any time. 7. Enter your Password Reset Question and Answer. This can be used at a later time if you forget your password. 8. Enter your e-mail address. You will receive e-mail notification when new information is available in [address] 9. Click Sign Up. You can now view and download portions of your medical record. 10. Click the Download Summary menu link to download a portable copy of your medical information.  
 
If you have questions, please visit the Frequently Asked Questions section of the Highlight. Remember, MyChart is NOT to be used for urgent needs. For medical emergencies, dial 911. Now available from your iPhone and Android! Providers Seen During Your Hospitalization Provider Specialty Primary office phone Minnie Mccoy MD Emergency Medicine [phone] Sandra Sandoval MD Internal Medicine [phone] [facility],  Internal Medicine [phone] James Rose MD Internal Medicine [phone] Immunizations Administered for This Admission Name Date Influenza Vaccine (Quad) PF  Deferred () Your Primary Care Physician (PCP) Primary Care Physician Office Phone Office Fax NONE ** None ** ** None ** You are allergic to the following No active allergies Recent Documentation Height Weight BMI OB Status Smoking Status 1.549 m 77.2 kg 32.16 kg/m2 Menopause Never Smoker Emergency Contacts Name Discharge Info Relation Home Work Mobile Ankur Romero DISCHARGE CAREGIVER [3] Son [22]   [phone] Patient Belongings The following personal items are in your possession at time of discharge: 
  Dental Appliances: Uppers  Visual Aid: None      Home Medications: None   Jewelry: None  Clothing: At bedside    Other Valuables: [place] Going Please provide this summary of care documentation to your next provider. Signatures-by signing, you are acknowledging that this After Visit Summary has been reviewed with you and you have received a copy. Patient Signature:  ____________________________________________________________ Date:  ____________________________________________________________  
  
Mar Womack Provider Signature:  ____________________________________________________________ Date:  ____________________________________________________________  
  
  
   
  
[address] [facility] [place] 
 
 
 [address] [facility] [address] [address] [phone] Patient: Padmini Eddy MRN: [id number] IPM:7/04/0163 Sobre lopez hospitalización Le admitieron el:  February 7, 2018 Lopez tratamiento más reciente fue el:  [facility] 5M1 MED SURG 1 Le dieron de fouzia el:  February 10, 2018 Por qué le ingresaron Lopez diagnosis primaria es:  No está archivado/a Lopez diagnosis también incluye:  Hyponatremia, Headache, Neck Pain, Htn (Hypertension), Benign, Type 2 Diabetes Mellitus Without Complication (Hcc), Leukocytosis, Hypotension Follow-up Information Follow up With Details Comments Contact Info Radha Sanchez MD   [place] [address] [address] [facility] 
[phone] None   None (395) Patient stated that they have no PCP Discharge Orders [facility] A check pablo indicates which time of day the medication should be taken. My Medications EMPIECE a Devver Instructions Each Dose to Equal  
 Morning Noon Evening Bedtime  
 gabapentin 100 mg capsule También conocido aggie:  NEURONTIN Your last dose was: Your next dose is: Take 1 Cap by mouth two (2) times a day for 30 days. 100 mg SIGA tomando estos medicamentos Instructions Each Dose to Equal  
 Morning Noon Evening Bedtime NovoLIN N 100 unit/mL injection Medicamento genérico:  insulin NPH Your last dose was: Your next dose is:    
   
   
 30 Units by SubCUTAneous route Before breakfast and dinner. 30 Units DEJE de terry estos medicamentos   
 valsartan-hydroCHLOROthiazide 160-12.5 mg per tablet También conocido aggie:  DIOVAN-HCT Dónde puede recoger christelle medicamentos Information on where to get these meds will be given to you by the nurse or doctor. ! Pregunte a lopez médico o enfermero/a sobre estos medicamentos  
  gabapentin 100 mg capsule Instrucciones a clementina de fouzia Patient Discharge Instructions Kiley Hannah / [id number] : 1960 Admitted 2018 Discharged: 2/10/2018 Primary Diagnoses Problem List as of 2/10/2018  Date Reviewed: 2018 Codes Class Noted - Resolved Hypotension Hyponatremia Headache Neck pain HTN (hypertension), benign Type 2 diabetes mellitus without complication (HCC) Leukocytosis Take Home Medications · It is important that you take the medication exactly as they are prescribed. · Keep your medication in the bottles provided by the pharmacist and keep a list of the medication names, dosages, and times to be taken in your wallet. · Do not take other medications without consulting your doctor. What to do at [facility] Recommended diet: Diabetic Diet and high salt, and limited fluid intake. Recommended activity: Activity as tolerated If you experience worse symptoms, please follow up with any PCP. Follow-up with Dr Angelito Garza in a few weeks Information obtained by : 
I understand that if any problems occur once I am at home I am to contact my physician. I understand and acknowledge receipt of the instructions indicated above. Physician's or R.N.'s Signature                                                                  Date/Time Patient or Representative Signature                                                          Date/Time [employer] Announcement We are excited to announce that we are making your provider's discharge notes available to you in [employer].   You will see these notes when they are completed and signed by the physician that discharged you from your recent hospital stay. If you have any questions or concerns about any information you see in Say[employer], please call the Health Information Department where you were seen or reach out to your Primary Care Provider for more information about your plan of care. Introducing [facility]! [facility] introduces Saygent patient portal. Now you can access parts of your medical record, email your doctor's office, and request medication refills online. 1. In your internet browser, go to [email or web address] 2. Click on the First Time User? Click Here link in the Sign In box. You will see the New Member Sign Up page. 3. Enter your Saygent Access Code exactly as it appears below. You will not need to use this code after youve completed the sign-up process. If you do not sign up before the expiration date, you must request a new code. · Saygent Access Code: [email or web address] Expires: 2/26/2018  2:30 PM 
 
4. Enter the last four digits of your Social Security Number (xxxx) and Date of Birth (mm/dd/yyyy) as indicated and click Submit. You will be taken to the next sign-up page. 5. Create a Saygent ID. This will be your Saygent login ID and cannot be changed, so think of one that is secure and easy to remember. 6. Create a Saygent password. You can change your password at any time. 7. Enter your Password Reset Question and Answer. This can be used at a later time if you forget your password. 8. Enter your e-mail address. You will receive e-mail notification when new information is available in [address] 9. Click Sign Up. You can now view and download portions of your medical record. 10. Click the Download Summary menu link to download a portable copy of your medical information. If you have questions, please visit the Frequently Asked Questions section of the Saygent website. Remember, Saygent is NOT to be used for urgent needs. For medical emergencies, dial 911. Now available from your iPhone and Android! Providers Seen During Your Hospitalization Personal M[facility] [employer] principal de la oficina Latia Lawson MD Emergency Medicine [phone] Marcela Covarrubias MD Internal Medicine [phone] Kelby Apgar, DO Internal Medicine [phone] Raman Lam MD Internal Medicine [phone] Darien Loyola Administradas en esta admisión:    
   
 Nimco Navarro Influenza Vaccine (Quad) PF  Deferred () Burton médico de atención primaria (PCP ) Primary Care Physician Office Phone Office Fax NONE ** None ** ** None ** Tiene alergias a lo siguiente No tiene alergias Documentación recientes Height Weight BMI (IMC) Estado obstétrico Estatus de tabaquísmo 1.549 m 77.2 kg 32.16 kg/m2 Menopause Never Smoker Emergency Contacts Name Discharge Info Relation Home Work Mobile NickAnkur DISCHARGE CAREGIVER [3] Son [22]   [phone] Patient Belongings The following personal items are in your possession at time of discharge: 
  Dental Appliances: Uppers  Visual Aid: None      Home Medications: None   Jewelry: None  Clothing: At bedside    Other Valuables: Maria L Patel Please provide this summary of care documentation to your next provider. Signatures-by signing, you are acknowledging that this After Visit Summary has been reviewed with you and you have received a copy. Patient Signature:  ____________________________________________________________ Date:  ____________________________________________________________  
  
Michelle Rees Provider Signature:  ____________________________________________________________ Date:  ____________________________________________________________

## 2018-02-07 NOTE — PROGRESS NOTES
[facility]: MED RECONCILIATION    Comments/Recommendations:   Pharmacist used interpretor [id number]      Allergies: Review of patient's allergies indicates no known allergies. Prior to Admission Medications:   Prior to Admission Medications   Prescriptions Last Dose Informant Patient Reported? Taking?   insulin NPH (NOVOLIN N) 100 unit/mL injection 2018 at am  Yes Yes   Si Units by SubCUTAneous route Before breakfast and dinner. valsartan-hydroCHLOROthiazide (DIOVAN-HCT) 160-12.5 mg per tablet 2018 at noon  Yes Yes   Sig: Take 1 Tab by mouth daily.       Facility-Administered Medications: None      Thank you,    Araceli Dasilva, PharmD, BCPS

## 2018-02-07 NOTE — ED PROVIDER NOTES
HPI Comments: 62 y.o. female with past medical history significant for DM, HTN, and GERD who presents accompanied with generalized body aches. The pt c/o HA, neck pain, back pain, and abdominal pain that have been ongoing for 2-3 days. The pt notes that she has been taking Tylenol without improvement of her pain. The pt also indicates mdisternal CP. The pt reports urinary urgency and SOB yesterday. The pt believes she has a cold due to her nasal congestion and post nasal drip. The pt reports that she last had immunizations when she was in the [place] about 8 months ago, and she indicates that she did not receive the flu vaccine. Denies fever, chills, diarrhea, constipation, sore throat, and cough. There are no other acute medical concerns at this time. Social hx: She has been \"here\" for about 4 months. nonsmoker    Note written by Alfredo Kimbrough, as dictated by Cindy Hensley MD 11:31 AM     The history is provided by the patient. A  was used (language line, [facility] [place] Past Medical History:   Diagnosis Date    Diabetes ([facility] [address]     GERD (gastroesophageal reflux disease)     Hypertension        Past Surgical History:   Procedure Laterality Date    HX BREAST LUMPECTOMY Left          History reviewed. No pertinent family history. Social History     Social History    Marital status: SINGLE     Spouse name: N/A    Number of children: N/A    Years of education: N/A     Occupational History    Not on file. Social History Main Topics    Smoking status: Never Smoker    Smokeless tobacco: Never Used    Alcohol use No    Drug use: No    Sexual activity: Not on file     Other Topics Concern    Not on file     Social History Narrative         ALLERGIES: Review of patient's allergies indicates no known allergies. Review of Systems   HENT: Positive for congestion (nasal) and postnasal drip. Negative for sore throat. Respiratory: Positive for shortness of breath.  Negative for cough. Cardiovascular: Positive for chest pain. Gastrointestinal: Positive for abdominal pain. Negative for constipation and diarrhea. Genitourinary: Positive for urgency. Musculoskeletal: Positive for back pain and neck pain. Neurological: Positive for headaches. All other systems reviewed and are negative. Vitals:    02/07/18 1104 02/07/18 1120   BP: 185/89    Pulse: 82    Resp: 16    Temp:  99.1 °F (37.3 °C)   SpO2: 100%    Weight: 68.9 kg (152 lb)    Height: 5' 1\" (1.549 m)             Physical Exam   Constitutional: She is oriented to person, place, and time. She appears well-developed and well-nourished. NAD, AxOx4, speaking in complete Maltese sentences,  phone used  GCS = 15    No meningeal signs     HENT:   Head: Normocephalic and atraumatic. Nose: Nose normal.   Mouth/Throat: Oropharynx is clear and moist.   Cn intact       Eyes: Conjunctivae and EOM are normal. Pupils are equal, round, and reactive to light. Right eye exhibits no discharge. Left eye exhibits no discharge. No scleral icterus. Neck: Normal range of motion. Neck supple. No JVD present. No tracheal deviation present. Cardiovascular: Normal rate, regular rhythm, normal heart sounds and intact distal pulses. Exam reveals no gallop and no friction rub. No murmur heard. Pulmonary/Chest: Effort normal and breath sounds normal. No respiratory distress. She has no wheezes. She has no rales. She exhibits no tenderness. Abdominal: Soft. Bowel sounds are normal. There is no tenderness. There is no rebound and no guarding. nttp     Genitourinary: No vaginal discharge found. Genitourinary Comments: Pt denies urinary/ vaginal complaints   Musculoskeletal: Normal range of motion. She exhibits tenderness. She exhibits no edema or deformity. Thoracic back: She exhibits tenderness and pain.  She exhibits normal range of motion, no bony tenderness, no swelling, no edema, no deformity, no laceration, no spasm and normal pulse. Back:    Cp/ upper back pain   Neurological: She is alert and oriented to person, place, and time. She has normal reflexes. No cranial nerve deficit. She exhibits normal muscle tone. Coordination normal.   pt has motor/ CV/ Sensation grossly intact to all extremities, R = L in strength;   Skin: Skin is warm and dry. No rash noted. No erythema. No pallor. Psychiatric: She has a normal mood and affect. Her behavior is normal. Thought content normal.   Nursing note and vitals reviewed. MDM  Number of Diagnoses or Management Options  Hyponatremia:   Risk of Complications, Morbidity, and/or Mortality  Presenting problems: high  Diagnostic procedures: moderate  Management options: moderate    Critical Care  Total time providing critical care: 30-74 minutes (45 min)    Patient Progress  Patient progress: stable        ED Course       Procedures    Chief Complaint   Patient presents with    Abdominal Pain    Urgency    Headache       11:44 AM  The patients presenting problems have been discussed, and they are in agreement with the care plan formulated and outlined with them. I have encouraged them to ask questions as they arise throughout their visit. MEDICATIONS GIVEN:  Medications   ketorolac (TORADOL) injection 15 mg (not administered)   sodium chloride 0.9 % bolus infusion 1,000 mL (not administered)       LABS REVIEWED:  Labs Reviewed   URINE CULTURE HOLD SAMPLE   URINALYSIS W/MICROSCOPIC   TROPONIN I   LIPASE   METABOLIC PANEL, COMPREHENSIVE   CK W/ CKMB & INDEX   CBC WITH AUTOMATED DIFF   NT-PRO BNP   SAMPLES BEING HELD       RADIOLOGY RESULTS:  The following have been ordered and reviewed:  _____________________________________________________________________  _____________________________________________________________________    EKG interpretation: (Preliminary)  Rhythm: normal sinus rhythm; and regular .  Rate (approx.): 80; Axis: normal; P wave: normal; QRS interval: normal ; ST/T wave: normal; Negative acute significant segmental elevations/ no old study for comparison    PROCEDURES:        CONSULTATIONS:       PROGRESS NOTES:      DIAGNOSIS:    1. Hyponatremia        PLAN:  1- admit/ ns 1 L given; 'feels better now'; agrees w/ plans;       ED COURSE: The patients hospital course has been uncomplicated. 12:45 PM  Pt 'feels a bit better now';      12:54 PM  Pt told of pending hospitalists evaluation/ agrees w/ plans; Dr Portia camara texted/ replied 'please let Dr Priti Pena know as it is 12:54';     CONSULT NOTE:  1:03 PM Kathryn Morrissey MD spoke with Dr. Priti Pena, Consult for Hospitalist.  Discussed available diagnostic tests and clinical findings. Provider is in agreement with care plans as outlined.  Provider will evaluate the patient for admission to the hospital.

## 2018-02-07 NOTE — CONSULTS
[address] Gustafson  MR#: [id number]  : 1960  ACCOUNT #: [de-identified]   DATE OF SERVICE: 2018    REASON FOR CONSULTATION:  Hyponatremia. HISTORY OF PRESENT ILLNESS:  This is a 59-year-old non-English-speaking [place] woman for whom we are asked to see regarding a serum sodium of 116 mEq/L. For reference, a sodium was obtained on 2017, 122 mEq/L. As noted above, the patient speaks no [place]. She presented to the hospital today with headaches, neck pain, weakness and fever; in the emergency room she was found to be quite hyponatremic. Her outpatient medications revealed that she is on a thiazide diuretic. There were no complaints of any diarrhea or urinary symptoms, no cough, shortness of breath. Interestingly, her sodium was low last November, but notes are not available to determine the outcome from that single blood draw. She has been having a frontal headache, associated with some nausea, neck pain and subjective fever, as well as some photophobia. OUTPATIENT MEDICATIONS:  Noted. Include insulin and valsartan/hydrochlorothiazide (160/12.5). FAMILY HISTORY:  Notable for diabetes. SOCIAL HISTORY:  She does not smoke. PAST MEDICAL HISTORY:  Includes diabetes, hypertension, and GERD. REVIEW OF SYSTEMS:  Unobtainable. PHYSICAL EXAMINATION:  GENERAL:  An elderly-appearing  female, currently eating dinner, with a bag of fluid hanging from her IV pole. VITAL SIGNS:  Most recent blood pressure documented is 139/73, pulse 72, temperature 98 degrees. HEAD:  Normocephalic. EYES:  Show no scleral icterus. LUNGS:  Clear to auscultation. CARDIOVASCULAR:  Shows a rhythm which is regular. ABDOMEN:  Nondistended. EXTREMITIES:  Show no ankle edema. SKIN:  Warm to touch. NEUROLOGIC:  She is awake and alert, attempts to converse with me.     LABORATORY DATA:  Of note, white blood cell count 12.4, hematocrit 31%, platelet count 072,078. Urinalysis shows a pH of 7.5, specific gravity 1.006, urine proteins positive, urine blood is negative. Sodium 116, potassium 3.9, chloride and bicarbonate are 82 and 23, with a BUN and creatinine of 14 and 0.83, calcium is 8.9. Her albumin is 3.2. X-ray of the chest on 2017 showed no acute abnormality. ASSESSMENT AND PLAN:  The patient is here with hyponatremia, which has worsened since that which was obtained last November. No intervening values are available for comparison. She has been hypertensive during this hospitalization, suggesting the absence of Hendricks's. No thyroid stimulating hormone is currently available. No repeat chest x-ray has been performed, but I suspect little has changed since last November. She is a nonsmoker. She has been on a thiazide diuretic. She has had some nausea, a potent stimulant for vasopressin secretion. At this juncture, we will do the followin. Repeat labs stat -- to be called to my partners. 2.  May need infusion of 3% saline plus/minus furosemide (to increase free water clearance). 3.  TSH and cortisol have been ordered. 4.  Chemistries every 6 hours. 5.  Spot urine for sodium, potassium, osmolality to assess electrolyte free water excretion. Await the labs just ordered; I have asked that these be called to my partner on-call. Thanks for the consult.       Matt Gibbons MD       [facility]  D: 2018 16:45     T: 2018 17:17  JOB #: [id number]  CC: Gaurav Howe MD

## 2018-02-07 NOTE — IP AVS SNAPSHOT
[facility] 
 
 
 [address] [facility] 70 [facility] 
[phone] Patient: Clare Morejon MRN: [id number] [facility]:[birth year] A check pablo indicates which time of day the medication should be taken. My Medications START taking these medications Instructions Each Dose to Equal  
 Morning Noon Evening Bedtime  
 gabapentin 100 mg capsule Commonly known as:  NEURONTIN Your last dose was: Your next dose is: Take 1 Cap by mouth two (2) times a day for 30 days. 100 mg CONTINUE taking these medications Instructions Each Dose to Equal  
 Morning Noon Evening Bedtime NovoLIN N 100 unit/mL injection Generic drug:  insulin NPH Your last dose was: Your next dose is:    
   
   
 30 Units by SubCUTAneous route Before breakfast and dinner. 30 Units STOP taking these medications   
 valsartan-hydroCHLOROthiazide 160-12.5 mg per tablet Commonly known as:  DIOVAN-HCT Where to Get Your Medications Information on where to get these meds will be given to you by the nurse or doctor. ! Ask your nurse or doctor about these medications  
  gabapentin 100 mg capsule

## 2018-02-07 NOTE — ED NOTES
TRANSFER - OUT REPORT:    Verbal report given to Amanda Menchaca RN(name) on Soy Prasad  being transferred to Medical(unit) for routine progression of care       Report consisted of patients Situation, Background, Assessment and   Recommendations(SBAR). Information from the following report(s) SBAR was reviewed with the receiving nurse. Lines:   Peripheral IV 02/07/18 Left Forearm (Active)   Site Assessment Clean, dry, & intact 2/7/2018 11:40 AM   Phlebitis Assessment 0 2/7/2018 11:40 AM   Infiltration Assessment 0 2/7/2018 11:40 AM   Dressing Status Clean, dry, & intact 2/7/2018 11:40 AM   Dressing Type Tape;Transparent 2/7/2018 11:40 AM   Hub Color/Line Status Pink;Flushed;Patent 2/7/2018 11:40 AM   Action Taken Blood drawn 2/7/2018 11:40 AM        Opportunity for questions and clarification was provided.       Patient transported with:   [employer]

## 2018-02-08 ENCOUNTER — APPOINTMENT (OUTPATIENT)
Dept: GENERAL RADIOLOGY | Age: 58
DRG: 645 | End: 2018-02-08
Attending: INTERNAL MEDICINE
Payer: SUBSIDIZED

## 2018-02-08 LAB
ANION GAP SERPL CALC-SCNC: 10 MMOL/L (ref 5–15)
ANION GAP SERPL CALC-SCNC: 7 MMOL/L (ref 5–15)
ANION GAP SERPL CALC-SCNC: 7 MMOL/L (ref 5–15)
ANION GAP SERPL CALC-SCNC: 8 MMOL/L (ref 5–15)
ANION GAP SERPL CALC-SCNC: 8 MMOL/L (ref 5–15)
ANION GAP SERPL CALC-SCNC: 9 MMOL/L (ref 5–15)
BASOPHILS # BLD: 0.1 K/UL (ref 0–0.1)
BASOPHILS NFR BLD: 1 % (ref 0–1)
BUN SERPL-MCNC: 16 MG/DL (ref 6–20)
BUN SERPL-MCNC: 18 MG/DL (ref 6–20)
BUN SERPL-MCNC: 18 MG/DL (ref 6–20)
BUN SERPL-MCNC: 19 MG/DL (ref 6–20)
BUN/CREAT SERPL: 19 (ref 12–20)
BUN/CREAT SERPL: 20 (ref 12–20)
BUN/CREAT SERPL: 21 (ref 12–20)
BUN/CREAT SERPL: 23 (ref 12–20)
CALCIUM SERPL-MCNC: 8.1 MG/DL (ref 8.5–10.1)
CALCIUM SERPL-MCNC: 8.3 MG/DL (ref 8.5–10.1)
CALCIUM SERPL-MCNC: 8.4 MG/DL (ref 8.5–10.1)
CALCIUM SERPL-MCNC: 8.6 MG/DL (ref 8.5–10.1)
CALCIUM SERPL-MCNC: 8.6 MG/DL (ref 8.5–10.1)
CALCIUM SERPL-MCNC: 9.1 MG/DL (ref 8.5–10.1)
CHLORIDE SERPL-SCNC: 88 MMOL/L (ref 97–108)
CHLORIDE SERPL-SCNC: 88 MMOL/L (ref 97–108)
CHLORIDE SERPL-SCNC: 89 MMOL/L (ref 97–108)
CHLORIDE SERPL-SCNC: 89 MMOL/L (ref 97–108)
CHLORIDE SERPL-SCNC: 90 MMOL/L (ref 97–108)
CHLORIDE SERPL-SCNC: 90 MMOL/L (ref 97–108)
CO2 SERPL-SCNC: 23 MMOL/L (ref 21–32)
CO2 SERPL-SCNC: 23 MMOL/L (ref 21–32)
CO2 SERPL-SCNC: 24 MMOL/L (ref 21–32)
CO2 SERPL-SCNC: 24 MMOL/L (ref 21–32)
CO2 SERPL-SCNC: 25 MMOL/L (ref 21–32)
CO2 SERPL-SCNC: 25 MMOL/L (ref 21–32)
CREAT SERPL-MCNC: 0.75 MG/DL (ref 0.55–1.02)
CREAT SERPL-MCNC: 0.8 MG/DL (ref 0.55–1.02)
CREAT SERPL-MCNC: 0.83 MG/DL (ref 0.55–1.02)
CREAT SERPL-MCNC: 0.84 MG/DL (ref 0.55–1.02)
CREAT SERPL-MCNC: 0.85 MG/DL (ref 0.55–1.02)
CREAT SERPL-MCNC: 0.87 MG/DL (ref 0.55–1.02)
DIFFERENTIAL METHOD BLD: ABNORMAL
EOSINOPHIL # BLD: 1.1 K/UL (ref 0–0.4)
EOSINOPHIL NFR BLD: 8 % (ref 0–7)
ERYTHROCYTE [DISTWIDTH] IN BLOOD BY AUTOMATED COUNT: 11.9 % (ref 11.5–14.5)
GLUCOSE BLD STRIP.AUTO-MCNC: 112 MG/DL (ref 65–100)
GLUCOSE BLD STRIP.AUTO-MCNC: 127 MG/DL (ref 65–100)
GLUCOSE BLD STRIP.AUTO-MCNC: 168 MG/DL (ref 65–100)
GLUCOSE BLD STRIP.AUTO-MCNC: 90 MG/DL (ref 65–100)
GLUCOSE SERPL-MCNC: 112 MG/DL (ref 65–100)
GLUCOSE SERPL-MCNC: 150 MG/DL (ref 65–100)
GLUCOSE SERPL-MCNC: 152 MG/DL (ref 65–100)
GLUCOSE SERPL-MCNC: 179 MG/DL (ref 65–100)
GLUCOSE SERPL-MCNC: 70 MG/DL (ref 65–100)
GLUCOSE SERPL-MCNC: 71 MG/DL (ref 65–100)
HCT VFR BLD AUTO: 27.8 % (ref 35–47)
HGB BLD-MCNC: 10.2 G/DL (ref 11.5–16)
IMM GRANULOCYTES # BLD: 0.1 K/UL (ref 0–0.04)
IMM GRANULOCYTES NFR BLD AUTO: 1 % (ref 0–0.5)
LYMPHOCYTES # BLD: 5 K/UL (ref 0.8–3.5)
LYMPHOCYTES NFR BLD: 38 % (ref 12–49)
MCH RBC QN AUTO: 29.2 PG (ref 26–34)
MCHC RBC AUTO-ENTMCNC: 36.7 G/DL (ref 30–36.5)
MCV RBC AUTO: 79.7 FL (ref 80–99)
MONOCYTES # BLD: 1.2 K/UL (ref 0–1)
MONOCYTES NFR BLD: 9 % (ref 5–13)
NEUTS SEG # BLD: 5.7 K/UL (ref 1.8–8)
NEUTS SEG NFR BLD: 43 % (ref 32–75)
NRBC # BLD: 0 K/UL (ref 0–0.01)
NRBC BLD-RTO: 0 PER 100 WBC
OSMOLALITY UR: 301 MOSM/KG H2O
PLATELET # BLD AUTO: 316 K/UL (ref 150–400)
PMV BLD AUTO: 8.4 FL (ref 8.9–12.9)
POTASSIUM SERPL-SCNC: 3.4 MMOL/L (ref 3.5–5.1)
POTASSIUM SERPL-SCNC: 3.5 MMOL/L (ref 3.5–5.1)
POTASSIUM SERPL-SCNC: 3.7 MMOL/L (ref 3.5–5.1)
POTASSIUM SERPL-SCNC: 3.9 MMOL/L (ref 3.5–5.1)
POTASSIUM UR-SCNC: 24 MMOL/L
RBC # BLD AUTO: 3.49 M/UL (ref 3.8–5.2)
RBC MORPH BLD: ABNORMAL
SERVICE CMNT-IMP: ABNORMAL
SERVICE CMNT-IMP: NORMAL
SODIUM SERPL-SCNC: 120 MMOL/L (ref 136–145)
SODIUM SERPL-SCNC: 120 MMOL/L (ref 136–145)
SODIUM SERPL-SCNC: 121 MMOL/L (ref 136–145)
SODIUM SERPL-SCNC: 121 MMOL/L (ref 136–145)
SODIUM SERPL-SCNC: 122 MMOL/L (ref 136–145)
SODIUM SERPL-SCNC: 123 MMOL/L (ref 136–145)
SODIUM UR-SCNC: 37 MMOL/L
WBC # BLD AUTO: 13.2 K/UL (ref 3.6–11)

## 2018-02-08 PROCEDURE — 83935 ASSAY OF URINE OSMOLALITY: CPT | Performed by: INTERNAL MEDICINE

## 2018-02-08 PROCEDURE — 80048 BASIC METABOLIC PNL TOTAL CA: CPT | Performed by: INTERNAL MEDICINE

## 2018-02-08 PROCEDURE — 84300 ASSAY OF URINE SODIUM: CPT | Performed by: INTERNAL MEDICINE

## 2018-02-08 PROCEDURE — 74011250637 HC RX REV CODE- 250/637: Performed by: INTERNAL MEDICINE

## 2018-02-08 PROCEDURE — 82962 GLUCOSE BLOOD TEST: CPT

## 2018-02-08 PROCEDURE — 71045 X-RAY EXAM CHEST 1 VIEW: CPT

## 2018-02-08 PROCEDURE — 36415 COLL VENOUS BLD VENIPUNCTURE: CPT | Performed by: INTERNAL MEDICINE

## 2018-02-08 PROCEDURE — 74011250636 HC RX REV CODE- 250/636: Performed by: INTERNAL MEDICINE

## 2018-02-08 PROCEDURE — 85025 COMPLETE CBC W/AUTO DIFF WBC: CPT | Performed by: INTERNAL MEDICINE

## 2018-02-08 PROCEDURE — 74011636637 HC RX REV CODE- 636/637: Performed by: INTERNAL MEDICINE

## 2018-02-08 PROCEDURE — 65270000029 HC RM PRIVATE

## 2018-02-08 PROCEDURE — 84133 ASSAY OF URINE POTASSIUM: CPT | Performed by: INTERNAL MEDICINE

## 2018-02-08 RX ORDER — GABAPENTIN 300 MG/1
300 CAPSULE ORAL 2 TIMES DAILY
Status: DISCONTINUED | OUTPATIENT
Start: 2018-02-08 | End: 2018-02-10 | Stop reason: HOSPADM

## 2018-02-08 RX ADMIN — HUMAN INSULIN 25 UNITS: 100 INJECTION, SUSPENSION SUBCUTANEOUS at 08:08

## 2018-02-08 RX ADMIN — HUMAN INSULIN 25 UNITS: 100 INJECTION, SUSPENSION SUBCUTANEOUS at 17:25

## 2018-02-08 RX ADMIN — Medication 10 ML: at 23:06

## 2018-02-08 RX ADMIN — Medication 10 ML: at 14:49

## 2018-02-08 RX ADMIN — MORPHINE SULFATE 2 MG: 2 INJECTION, SOLUTION INTRAMUSCULAR; INTRAVENOUS at 10:44

## 2018-02-08 RX ADMIN — GABAPENTIN 300 MG: 300 CAPSULE ORAL at 17:24

## 2018-02-08 NOTE — PROGRESS NOTES
Sohail Calhoun [facility] [address]  566 [facility], [address] [place] [address]  [phone]      Medical Progress Note      NAME: Enriqueta Chacon   :  1960  MRM:  [id number]    Date/Time: 2018  3:07 PM       Assessment and Plan:     Hyponatremia (2018): severe. Likely due to HCT use/SIADH/Polydipsia. Appreciate nephrology assistance. Needs fluid restriction for now, holding HCTZ. Serial BMP with slow increase in sodium. If tolvaptan used, patient should not be on fluid restriction, will use tomorrow if sodium remains low/recalcitrant     Headache (2018)/ Neck pain (2018): Improving. May be due to low sodium. I do not believe this is meningeal. Hold any LP.     HTN (hypertension), benign (2018): BP stable. Hold home medications for now. Informed family that she should stay off HCTZ indefinitely.     Type 2 diabetes mellitus without complication ([facility] [address] (4297): A1c is 7.4. Continue SSI, diabetic diet and home insulin     Leukocytosis (2018): unclear etiology. Chest Xray and UA unremarkable. Monitor     Paresthesia. In lower legs. May be some neuropathy, will trial gabapentin       Subjective:     Chief Complaint:  Patient seen and examined. Chart reviewed. Daughter acted as  for patient during our interaction. Patient states she feels better. All questions answered. ROS:  (bold if positive, if negative)      Tolerating PT  Tolerating Diet        Objective:     Last 24hrs VS reviewed since prior progress note.  Most recent are:    Visit Vitals    /70 (BP 1 Location: Left arm, BP Patient Position: At rest)    Pulse 61    Temp 98.3 °F (36.8 °C)    Resp 17    Ht 5' 1\" (1.549 m)    Wt 68.9 kg (152 lb)    SpO2 98%    BMI 28.72 kg/m2     SpO2 Readings from Last 6 Encounters:   18 98%   17 100%        No intake or output data in the 24 hours ending 18 1507     Physical Exam:    Gen:  Well-developed, well-nourished, in no acute distress  HEENT: Pink conjunctivae, PERRL, hearing intact to voice, moist mucous membranes  Neck:  Supple, without masses, thyroid non-tender  Resp:  No accessory muscle use, clear breath sounds without wheezes rales or rhonchi  Card:  No murmurs, normal S1, S2 without thrills, bruits or peripheral edema  Abd:  Soft, non-tender, non-distended, normoactive bowel sounds are present, no palpable organomegaly and no detectable hernias  Lymph:  No cervical or inguinal adenopathy  Musc:  No cyanosis or clubbing  Skin:  No rashes or ulcers, skin turgor is good  Neuro:  Cranial nerves are grossly intact, no focal motor weakness, follows commands appropriately  Psych:  Good insight, oriented to person, place and time, alert    __________________________________________________________________  Medications Reviewed: (see below)  Medications:     Current Facility-Administered Medications   Medication Dose Route Frequency    influenza vaccine 2017-18 (3 yrs+)(PF) (FLUZONE QUAD/FLUARIX QUAD) injection 0.5 mL  0.5 mL IntraMUSCular PRIOR TO DISCHARGE    gabapentin (NEURONTIN) capsule 300 mg  300 mg Oral BID    sodium chloride (NS) flush 5-10 mL  5-10 mL IntraVENous Q8H    sodium chloride (NS) flush 5-10 mL  5-10 mL IntraVENous PRN    acetaminophen (TYLENOL) tablet 650 mg  650 mg Oral Q4H PRN    morphine injection 2 mg  2 mg IntraVENous Q4H PRN    naloxone (NARCAN) injection 0.4 mg  0.4 mg IntraVENous PRN    diphenhydrAMINE (BENADRYL) injection 12.5 mg  12.5 mg IntraVENous Q4H PRN    prochlorperazine (COMPAZINE) injection 5 mg  5 mg IntraVENous Q8H PRN    insulin lispro (HUMALOG) injection   SubCUTAneous AC&HS    glucose chewable tablet 16 g  4 Tab Oral PRN    dextrose (D50W) injection syrg 12.5-25 g  12.5-25 g IntraVENous PRN    glucagon (GLUCAGEN) injection 1 mg  1 mg IntraMUSCular PRN    insulin NPH (NOVOLIN N, HUMULIN N) injection 25 Units  25 Units SubCUTAneous ACB&D    hydrALAZINE (APRESOLINE) 20 mg/mL injection 10 mg  10 mg IntraVENous Q6H PRN        Lab Data Reviewed: (see below)  Lab Review:     Recent Labs      02/08/18   0117  02/07/18   1141   WBC  13.2*  12.4*   HGB  10.2*  11.5   HCT  27.8*  31.4*   PLT  316  368     Recent Labs      02/08/18   1400  02/08/18   0931  02/08/18   0457   02/07/18   1701  02/07/18   1141   NA  120*  120*  121*   < >  121*  121*  116*   K  3.9  3.7  3.5   < >  4.0  3.9  3.9   CL  88*  89*  88*   < >  87*  87*  82*   CO2  23  24  23   < >  27  26  23   GLU  152*  150*  71   < >  117*  118*  217*   BUN  19  16  16   < >  13  14  14   CREA  0.83  0.84  0.75   < >  0.78  0.77  0.83   CA  8.6  8.4*  8.3*   < >  8.5  8.5  8.9   PHOS   --    --    --    --   4.5   --    ALB   --    --    --    --   3.3*  3.2*   TBILI   --    --    --    --    --   0.4   SGOT   --    --    --    --    --   32   ALT   --    --    --    --    --   41    < > = values in this interval not displayed. Lab Results   Component Value Date/Time    Glucose (POC) 112 (H) 02/08/2018 11:38 AM    Glucose (POC) 90 02/08/2018 07:29 AM    Glucose (POC) 142 (H) 02/07/2018 09:12 PM    Glucose (POC) 133 (H) 02/07/2018 05:01 PM     No results for input(s): PH, PCO2, PO2, HCO3, FIO2 in the last 72 hours. No results for input(s): INR in the last 72 hours. No lab exists for component: INREXT  All Micro Results     Procedure Component Value Units Date/Time    CULTURE, URINE [[id number]] Collected:  02/07/18 1423    Order Status:  Completed Specimen:  Urine from Clean catch Updated:  02/07/18 2138    CULTURE, CSF Ashley Hilts STAIN [[id number]]     Order Status:  Sent Specimen:  Cerebrospinal Fluid     URINE CULTURE HOLD SAMPLE [[id number]] Collected:  02/07/18 1423    Order Status:  Completed Specimen:  Urine from Serum Updated:  02/07/18 1451     Urine culture hold         URINE ON HOLD IN MICROBIOLOGY DEPT FOR 3 DAYS. IF UNPRESERVED URINE IS SUBMITTED, IT CANNOT BE USED FOR ADDITIONAL TESTING AFTER 24 HRS, RECOLLECTION WILL BE REQUIRED. I have reviewed notes of prior 24hr.     Other pertinent lab: None    Total time spent with patient: [address] discussed with: Patient, Family, Care Manager, Nursing Staff and Consultant/Specialist    Discussed:  Care Plan and D/C Planning    Prophylaxis:  Lovenox    Disposition:  Home w/Family           ___________________________________________________    Attending Physician: Kam Kirby DO

## 2018-02-08 NOTE — ROUTINE PROCESS
Bedside shift change report given to Sudha De La Vega (oncoming nurse) by Jes Vitale (offgoing nurse). Report included the following information SBAR, Kardex, ED Summary and Recent Results.

## 2018-02-08 NOTE — PROGRESS NOTES
Bedside and Verbal shift change report given to Magi Harden RN (oncoming nurse) by Chino Castle RN (offgoing nurse). Report included the following information SBAR, Kardex, MAR, Accordion and Recent Results.

## 2018-02-08 NOTE — ROUTINE PROCESS
Bedside, Verbal and Written shift change report given to Darren Ramirez (oncoming nurse) by Tee Lopez (offgoing nurse). Report included the following information SBAR, Kardex, MAR, Accordion and Recent Results.

## 2018-02-08 NOTE — PROGRESS NOTES
[address] [facility]  YOB: 1960          Assessment & Plan:   Hyponatremia  · She likely has some mild SIAD + excessive fluid intake + HCTZ  · Off HCTZ. Na has improved 4-5 mmol in just under 24 hours. Correcting at appropriate rate. · Has not been on fluid restriction. Start fluid restriction and discussed with family and patient. Her urine is fairly dilute (Uosm around 200 or less) so she should hopefully correct. · If she does not continue to progress would consider tolvaptan tomorrow    HTN  · OK  · Needs to remain off HCTZ so will need new prescription if ARB is resumed    DM       Subjective:   CC: f/u hyponatremia  HPI: Hyponatremia is improving   ROS: no n/v/sz  Current Facility-Administered Medications   Medication Dose Route Frequency    influenza vaccine 2017-18 (3 yrs+)(PF) (FLUZONE QUAD/FLUARIX QUAD) injection 0.5 mL  0.5 mL IntraMUSCular PRIOR TO DISCHARGE    sodium chloride (NS) flush 5-10 mL  5-10 mL IntraVENous Q8H    sodium chloride (NS) flush 5-10 mL  5-10 mL IntraVENous PRN    acetaminophen (TYLENOL) tablet 650 mg  650 mg Oral Q4H PRN    morphine injection 2 mg  2 mg IntraVENous Q4H PRN    naloxone (NARCAN) injection 0.4 mg  0.4 mg IntraVENous PRN    diphenhydrAMINE (BENADRYL) injection 12.5 mg  12.5 mg IntraVENous Q4H PRN    prochlorperazine (COMPAZINE) injection 5 mg  5 mg IntraVENous Q8H PRN    insulin lispro (HUMALOG) injection   SubCUTAneous AC&HS    glucose chewable tablet 16 g  4 Tab Oral PRN    dextrose (D50W) injection syrg 12.5-25 g  12.5-25 g IntraVENous PRN    glucagon (GLUCAGEN) injection 1 mg  1 mg IntraMUSCular PRN    insulin NPH (NOVOLIN N, HUMULIN N) injection 25 Units  25 Units SubCUTAneous ACB&D    hydrALAZINE (APRESOLINE) 20 mg/mL injection 10 mg  10 mg IntraVENous Q6H PRN          Objective:     Vitals:  Blood pressure 143/80, pulse 72, temperature 97.9 °F (36.6 °C), resp.  rate 19, height 5' 1\" (1.549 m), weight 68.9 kg (152 lb), SpO2 99 %. Temp (24hrs), Av.1 °F (36.7 °C), Min:97.9 °F (36.6 °C), Max:98.4 °F (36.9 °C)      Intake and Output:          Physical Exam:               GENERAL ASSESSMENT: NAD  CHEST: CTA  HEART: S1S2  EXTREMITY: no EDEMA  NEURO:Grossly non focal          ECG/rhythm:    Data Review      No results for input(s): TNIPOC in the last 72 hours. No lab exists for component: ITNL   Recent Labs      18   1141   CPK  288*   CKMB  2.9   TROIQ  <0.04     Recent Labs      18   0931  18   0457  18   0117   18   1701  18   1141   NA  120*  121*  121*   < >  121*  121*  116*   K  3.7  3.5  3.4*   < >  4.0  3.9  3.9   CL  89*  88*  89*   < >  87*  87*  82*   CO2  24  23  24   < >  27  26  23   BUN  16  16  16   < >  13  14  14   CREA  0.84  0.75  0.80   < >  0.78  0.77  0.83   GLU  150*  71  70   < >  117*  118*  217*   PHOS   --    --    --    --   4.5   --    CA  8.4*  8.3*  8.1*   < >  8.5  8.5  8.9   ALB   --    --    --    --   3.3*  3.2*   WBC   --    --   13.2*   --    --   12.4*   HGB   --    --   10.2*   --    --   11.5   HCT   --    --   27.8*   --    --   31.4*   PLT   --    --   316   --    --   368    < > = values in this interval not displayed. No results for input(s): INR, PTP, APTT in the last 72 hours. No lab exists for component: INREXT  Needs: urine analysis, urine sodium, protein and creatinine  Lab Results   Component Value Date/Time    Sodium urine, random 37 2018 07:52 AM    Creatinine, urine 18.99 2018 02:24 PM           : Ashlyn Hunt MD  2018        [facility]:  [email or web address]  Angelina Pradhan office:  [address] [place] [address], [address] [place] [address],[address]  [place], [address] [place] [address]  Phone: [phone]  Fax :     [phone]    [place] office:  [address] [facility], [address] [place] [address]  Phone - [phone]  Fax - [phone]

## 2018-02-09 LAB
ANION GAP SERPL CALC-SCNC: 7 MMOL/L (ref 5–15)
ANION GAP SERPL CALC-SCNC: 7 MMOL/L (ref 5–15)
BACTERIA SPEC CULT: NORMAL
BASOPHILS # BLD: 0.1 K/UL (ref 0–0.1)
BASOPHILS NFR BLD: 1 % (ref 0–1)
BUN SERPL-MCNC: 20 MG/DL (ref 6–20)
BUN SERPL-MCNC: 21 MG/DL (ref 6–20)
BUN/CREAT SERPL: 21 (ref 12–20)
BUN/CREAT SERPL: 21 (ref 12–20)
CALCIUM SERPL-MCNC: 8.7 MG/DL (ref 8.5–10.1)
CALCIUM SERPL-MCNC: 8.8 MG/DL (ref 8.5–10.1)
CC UR VC: NORMAL
CHLORIDE SERPL-SCNC: 90 MMOL/L (ref 97–108)
CHLORIDE SERPL-SCNC: 94 MMOL/L (ref 97–108)
CHOLEST SERPL-MCNC: 141 MG/DL
CO2 SERPL-SCNC: 24 MMOL/L (ref 21–32)
CO2 SERPL-SCNC: 26 MMOL/L (ref 21–32)
CREAT SERPL-MCNC: 0.97 MG/DL (ref 0.55–1.02)
CREAT SERPL-MCNC: 1.02 MG/DL (ref 0.55–1.02)
DIFFERENTIAL METHOD BLD: ABNORMAL
EOSINOPHIL # BLD: 1.8 K/UL (ref 0–0.4)
EOSINOPHIL NFR BLD: 13 % (ref 0–7)
ERYTHROCYTE [DISTWIDTH] IN BLOOD BY AUTOMATED COUNT: 12.4 % (ref 11.5–14.5)
GLUCOSE BLD STRIP.AUTO-MCNC: 118 MG/DL (ref 65–100)
GLUCOSE BLD STRIP.AUTO-MCNC: 184 MG/DL (ref 65–100)
GLUCOSE BLD STRIP.AUTO-MCNC: 198 MG/DL (ref 65–100)
GLUCOSE BLD STRIP.AUTO-MCNC: 88 MG/DL (ref 65–100)
GLUCOSE SERPL-MCNC: 101 MG/DL (ref 65–100)
GLUCOSE SERPL-MCNC: 198 MG/DL (ref 65–100)
HCT VFR BLD AUTO: 30 % (ref 35–47)
HDLC SERPL-MCNC: 36 MG/DL
HDLC SERPL: 3.9 {RATIO} (ref 0–5)
HGB BLD-MCNC: 10.8 G/DL (ref 11.5–16)
IMM GRANULOCYTES # BLD: 0.1 K/UL (ref 0–0.04)
IMM GRANULOCYTES NFR BLD AUTO: 1 % (ref 0–0.5)
LDLC SERPL CALC-MCNC: 75.8 MG/DL (ref 0–100)
LIPID PROFILE,FLP: NORMAL
LYMPHOCYTES # BLD: 4.7 K/UL (ref 0.8–3.5)
LYMPHOCYTES NFR BLD: 33 % (ref 12–49)
MAGNESIUM SERPL-MCNC: 2 MG/DL (ref 1.6–2.4)
MCH RBC QN AUTO: 29.6 PG (ref 26–34)
MCHC RBC AUTO-ENTMCNC: 36 G/DL (ref 30–36.5)
MCV RBC AUTO: 82.2 FL (ref 80–99)
MONOCYTES # BLD: 1.4 K/UL (ref 0–1)
MONOCYTES NFR BLD: 10 % (ref 5–13)
NEUTS SEG # BLD: 6.1 K/UL (ref 1.8–8)
NEUTS SEG NFR BLD: 42 % (ref 32–75)
NRBC # BLD: 0 K/UL (ref 0–0.01)
NRBC BLD-RTO: 0 PER 100 WBC
PHOSPHATE SERPL-MCNC: 4.9 MG/DL (ref 2.6–4.7)
PLATELET # BLD AUTO: 348 K/UL (ref 150–400)
PMV BLD AUTO: 8.9 FL (ref 8.9–12.9)
POTASSIUM SERPL-SCNC: 3.7 MMOL/L (ref 3.5–5.1)
POTASSIUM SERPL-SCNC: 4 MMOL/L (ref 3.5–5.1)
RBC # BLD AUTO: 3.65 M/UL (ref 3.8–5.2)
RBC MORPH BLD: ABNORMAL
SERVICE CMNT-IMP: ABNORMAL
SERVICE CMNT-IMP: NORMAL
SERVICE CMNT-IMP: NORMAL
SODIUM SERPL-SCNC: 123 MMOL/L (ref 136–145)
SODIUM SERPL-SCNC: 124 MMOL/L (ref 136–145)
SODIUM SERPL-SCNC: 125 MMOL/L (ref 136–145)
TRIGL SERPL-MCNC: 146 MG/DL (ref ?–150)
VLDLC SERPL CALC-MCNC: 29.2 MG/DL
WBC # BLD AUTO: 14.2 K/UL (ref 3.6–11)

## 2018-02-09 PROCEDURE — 74011636637 HC RX REV CODE- 636/637: Performed by: INTERNAL MEDICINE

## 2018-02-09 PROCEDURE — 36415 COLL VENOUS BLD VENIPUNCTURE: CPT | Performed by: INTERNAL MEDICINE

## 2018-02-09 PROCEDURE — 80048 BASIC METABOLIC PNL TOTAL CA: CPT | Performed by: INTERNAL MEDICINE

## 2018-02-09 PROCEDURE — 82962 GLUCOSE BLOOD TEST: CPT

## 2018-02-09 PROCEDURE — 74011250637 HC RX REV CODE- 250/637: Performed by: INTERNAL MEDICINE

## 2018-02-09 PROCEDURE — 84100 ASSAY OF PHOSPHORUS: CPT | Performed by: INTERNAL MEDICINE

## 2018-02-09 PROCEDURE — 65270000029 HC RM PRIVATE

## 2018-02-09 PROCEDURE — 83735 ASSAY OF MAGNESIUM: CPT | Performed by: INTERNAL MEDICINE

## 2018-02-09 PROCEDURE — 85025 COMPLETE CBC W/AUTO DIFF WBC: CPT | Performed by: INTERNAL MEDICINE

## 2018-02-09 PROCEDURE — 80061 LIPID PANEL: CPT | Performed by: INTERNAL MEDICINE

## 2018-02-09 PROCEDURE — 84295 ASSAY OF SERUM SODIUM: CPT | Performed by: INTERNAL MEDICINE

## 2018-02-09 RX ORDER — TOLVAPTAN 15 MG/1
15 TABLET ORAL ONCE
Status: COMPLETED | OUTPATIENT
Start: 2018-02-09 | End: 2018-02-09

## 2018-02-09 RX ADMIN — GABAPENTIN 300 MG: 300 CAPSULE ORAL at 08:37

## 2018-02-09 RX ADMIN — HUMAN INSULIN 25 UNITS: 100 INJECTION, SUSPENSION SUBCUTANEOUS at 17:23

## 2018-02-09 RX ADMIN — HUMAN INSULIN 25 UNITS: 100 INJECTION, SUSPENSION SUBCUTANEOUS at 08:38

## 2018-02-09 RX ADMIN — Medication 10 ML: at 13:28

## 2018-02-09 RX ADMIN — INSULIN LISPRO 2 UNITS: 100 INJECTION, SOLUTION INTRAVENOUS; SUBCUTANEOUS at 12:33

## 2018-02-09 RX ADMIN — GABAPENTIN 300 MG: 300 CAPSULE ORAL at 17:23

## 2018-02-09 RX ADMIN — TOLVAPTAN 15 MG: 15 TABLET ORAL at 13:28

## 2018-02-09 NOTE — PROGRESS NOTES
[address] [facility]  YOB: 1960          Assessment & Plan:   Hyponatremia  · She likely has some mild SIAD + excessive fluid intake + HCTZ  · Na improving. Now stuck around 125. Rate has been well within target  · OK for tolvaptan today. Hopefully will get Na closer to 130 prior to d/c  · Rx tolvaptan 15 mg x 1    HTN  · OK off meds  · Continue to avoid thiazides    DM       Subjective:   CC: f/u hyponatremia  HPI: Hyponatremia is improving slowly with FR  ROS: no sz or confusion or weakness  Current Facility-Administered Medications   Medication Dose Route Frequency    tolvaptan (SAMSCA) tablet 15 mg  15 mg Oral ONCE    influenza vaccine 2017-18 (3 yrs+)(PF) (FLUZONE QUAD/FLUARIX QUAD) injection 0.5 mL  0.5 mL IntraMUSCular PRIOR TO DISCHARGE    gabapentin (NEURONTIN) capsule 300 mg  300 mg Oral BID    sodium chloride (NS) flush 5-10 mL  5-10 mL IntraVENous Q8H    sodium chloride (NS) flush 5-10 mL  5-10 mL IntraVENous PRN    acetaminophen (TYLENOL) tablet 650 mg  650 mg Oral Q4H PRN    morphine injection 2 mg  2 mg IntraVENous Q4H PRN    naloxone (NARCAN) injection 0.4 mg  0.4 mg IntraVENous PRN    diphenhydrAMINE (BENADRYL) injection 12.5 mg  12.5 mg IntraVENous Q4H PRN    prochlorperazine (COMPAZINE) injection 5 mg  5 mg IntraVENous Q8H PRN    insulin lispro (HUMALOG) injection   SubCUTAneous AC&HS    glucose chewable tablet 16 g  4 Tab Oral PRN    dextrose (D50W) injection syrg 12.5-25 g  12.5-25 g IntraVENous PRN    glucagon (GLUCAGEN) injection 1 mg  1 mg IntraMUSCular PRN    insulin NPH (NOVOLIN N, HUMULIN N) injection 25 Units  25 Units SubCUTAneous ACB&D    hydrALAZINE (APRESOLINE) 20 mg/mL injection 10 mg  10 mg IntraVENous Q6H PRN          Objective:     Vitals:  Blood pressure 130/69, pulse 74, temperature 98.4 °F (36.9 °C), resp. rate 19, height 5' 1\" (1.549 m), weight 68.9 kg (152 lb), SpO2 99 %.   Temp (24hrs), Av.9 °F (36.6 °C), Min:97.7 °F (36.5 °C), Max:98.4 °F (36.9 °C)      Intake and Output:          Physical Exam:               GENERAL ASSESSMENT: NAD  CHEST: CTA  HEART: S1S2  EXTREMITY: no EDEMA  NEURO:Grossly non focal          ECG/rhythm:    Data Review      No results for input(s): TNIPOC in the last 72 hours. No lab exists for component: ITNL   Recent Labs      18   1141   CPK  288*   CKMB  2.9   TROIQ  <0.04     Recent Labs      18   1033  18   0514  18   2250   18   0117   18   1701  18   1141   NA  123*  125*  122*   < >  121*   < >  121*  121*  116*   K  4.0  3.7  3.9   < >  3.4*   < >  4.0  3.9  3.9   CL  90*  94*  90*   < >  89*   < >  87*  87*  82*   CO2  26  24  25   < >  24   < >  27  26  23   BUN  20  21*  18   < >  16   < >  13  14  14   CREA  0.97  1.02  0.87   < >  0.80   < >  0.78  0.77  0.83   GLU  198*  101*  179*   < >  70   < >  117*  118*  217*   PHOS   --   4.9*   --    --    --    --   4.5   --    MG   --   2.0   --    --    --    --    --    --    CA  8.7  8.8  8.6   < >  8.1*   < >  8.5  8.5  8.9   ALB   --    --    --    --    --    --   3.3*  3.2*   WBC   --   14.2*   --    --   13.2*   --    --   12.4*   HGB   --   10.8*   --    --   10.2*   --    --   11.5   HCT   --   30.0*   --    --   27.8*   --    --   31.4*   PLT   --   348   --    --   316   --    --   368    < > = values in this interval not displayed. No results for input(s): INR, PTP, APTT in the last 72 hours. No lab exists for component: INREXT, INREXT  Needs: urine analysis, urine sodium, protein and creatinine  Lab Results   Component Value Date/Time    Sodium urine, random 37 2018 07:52 AM    Creatinine, urine 18.99 2018 02:24 PM           : Buddie Hodgkin, MD  2018        [facility]:  [email or web address]  Simon Cummings office:  [address] [place] [address] [employer], [address]  [place], [place] [address]  Phone: [phone]  Fax :     [phone]    [facility] office:  [address] [facility], [place]  Phone - [phone]  Fax - [phone]

## 2018-02-09 NOTE — PROGRESS NOTES
Bedside and Verbal shift change report given to Ciarra Paulson (oncoming nurse) by Jocelyne Escamilla (offgoing nurse). Report included the following information SBAR, Kardex, Intake/Output, MAR and Recent Results.

## 2018-02-09 NOTE — PROGRESS NOTES
2-9-2018 CASE MANAGEMENT NOTE:  I met with the pt (only speaks Iranian) and daughter, King Gillespie (y-[phone]), to determine potential discharge needs. The pt lives in [facility] [place] but has been visiting her son in [place] [facility] [place] for several months. She came to [place] to visit her daughter but will return to son's in [place]. The pt is independent with her ADL's, has no assistive devices but does not drive. I advised the daughter to tell her brother to try to locate a PCP for the pt in [place] as she has several chronic medical problems. The pt has no medical insurance but the family gets her medications from [facility] either in [place] or [place]. Care Management Interventions  PCP Verified by CM:  Yes (No PCP)  Transition of Care Consult (CM Consult): Discharge Planning  Discharge Durable Medical Equipment: No  Physical Therapy Consult: No  Occupational Therapy Consult: No  Speech Therapy Consult: No  Current Support Network:  (Currently visiting daughter but was staying with her son in [place] in [facility])  Confirm Follow Up Transport: Family  Plan discussed with Pt/Family/Caregiver: Yes  Discharge Location  Discharge Placement:  (Home with family)    [place] Dev, [place], CM

## 2018-02-09 NOTE — PROGRESS NOTES
[facility]  [address] [facility], [address] [place] [address]  [phone]      Medical Progress Note      NAME: Lu Jacobsen   :  1960  MRM:  [id number]    Date/Time: 2018  3:07 PM       Assessment and Plan:     Hyponatremia (2018): severe. Likely due to HCT use/SIADH/Polydipsia. Appreciate nephrology assistance. On fluid restriction with minimal improvement, holding HCTZ. Serial BMP with slow increase in sodium. If tolvaptan used, patient should not be on fluid restriction, nephrology to direct whether we use today     Headache (2018)/ Neck pain (2018): Improving. May be due to low sodium. I do not believe this is meningeal. Hold any LP.     HTN (hypertension), benign (2018): BP stable and actually on low side. Hold home medications for now. Informed family that she should stay off HCTZ indefinitely.     Type 2 diabetes mellitus without complication ([facility] (3/7/4702): A1c is 7.4. Continue SSI, diabetic diet and home insulin     Leukocytosis (2018): unclear etiology. Chest Xray and UA unremarkable. Monitor     Paresthesia. In lower legs. May be some neuropathy, improved with gabapentin QHS       Subjective:     Chief Complaint:  Patient seen and examined. Chart reviewed. Cyracom translation used. Patient is feeling okay, slept well. Denies neck pain    ROS:  (bold if positive, if negative)      Tolerating PT  Tolerating Diet        Objective:     Last 24hrs VS reviewed since prior progress note.  Most recent are:    Visit Vitals    /63 (BP 1 Location: Right arm, BP Patient Position: At rest)    Pulse 64    Temp 97.7 °F (36.5 °C)    Resp 17    Ht 5' 1\" (1.549 m)    Wt 68.9 kg (152 lb)    SpO2 100%    BMI 28.72 kg/m2     SpO2 Readings from Last 6 Encounters:   18 100%   17 100%        No intake or output data in the 24 hours ending 18 0930     Physical Exam:    Gen:  Well-developed, well-nourished, in no acute distress  HEENT:  Pink conjunctivae, PERRL, hearing intact to voice, moist mucous membranes  Neck:  Supple, without masses, thyroid non-tender  Resp:  No accessory muscle use, clear breath sounds without wheezes rales or rhonchi  Card:  No murmurs, normal S1, S2 without thrills, bruits or peripheral edema  Abd:  Soft, non-tender, non-distended, normoactive bowel sounds are present, no palpable organomegaly and no detectable hernias  Lymph:  No cervical or inguinal adenopathy  Musc:  No cyanosis or clubbing  Skin:  No rashes or ulcers, skin turgor is good  Neuro:  Cranial nerves are grossly intact, no focal motor weakness, follows commands appropriately  Psych:  Good insight, oriented to person, place and time, alert    __________________________________________________________________  Medications Reviewed: (see below)  Medications:     Current Facility-Administered Medications   Medication Dose Route Frequency    influenza vaccine 2017-18 (3 yrs+)(PF) (FLUZONE QUAD/FLUARIX QUAD) injection 0.5 mL  0.5 mL IntraMUSCular PRIOR TO DISCHARGE    gabapentin (NEURONTIN) capsule 300 mg  300 mg Oral BID    sodium chloride (NS) flush 5-10 mL  5-10 mL IntraVENous Q8H    sodium chloride (NS) flush 5-10 mL  5-10 mL IntraVENous PRN    acetaminophen (TYLENOL) tablet 650 mg  650 mg Oral Q4H PRN    morphine injection 2 mg  2 mg IntraVENous Q4H PRN    naloxone (NARCAN) injection 0.4 mg  0.4 mg IntraVENous PRN    diphenhydrAMINE (BENADRYL) injection 12.5 mg  12.5 mg IntraVENous Q4H PRN    prochlorperazine (COMPAZINE) injection 5 mg  5 mg IntraVENous Q8H PRN    insulin lispro (HUMALOG) injection   SubCUTAneous AC&HS    glucose chewable tablet 16 g  4 Tab Oral PRN    dextrose (D50W) injection syrg 12.5-25 g  12.5-25 g IntraVENous PRN    glucagon (GLUCAGEN) injection 1 mg  1 mg IntraMUSCular PRN    insulin NPH (NOVOLIN N, HUMULIN N) injection 25 Units  25 Units SubCUTAneous ACB&D    hydrALAZINE (APRESOLINE) 20 mg/mL injection 10 mg  10 mg IntraVENous Q6H PRN        Lab Data Reviewed: (see below)  Lab Review:     Recent Labs      02/09/18   0514  02/08/18   0117  02/07/18   1141   WBC  14.2*  13.2*  12.4*   HGB  10.8*  10.2*  11.5   HCT  30.0*  27.8*  31.4*   PLT  348  316  368     Recent Labs      02/09/18   0514  02/08/18   2250  02/08/18   1759   02/07/18   1701  02/07/18   1141   NA  125*  122*  123*   < >  121*  121*  116*   K  3.7  3.9  3.9   < >  4.0  3.9  3.9   CL  94*  90*  90*   < >  87*  87*  82*   CO2  24  25  25   < >  27  26  23   GLU  101*  179*  112*   < >  117*  118*  217*   BUN  21*  18  18   < >  13  14  14   CREA  1.02  0.87  0.85   < >  0.78  0.77  0.83   CA  8.8  8.6  9.1   < >  8.5  8.5  8.9   MG  2.0   --    --    --    --    --    PHOS  4.9*   --    --    --   4.5   --    ALB   --    --    --    --   3.3*  3.2*   TBILI   --    --    --    --    --   0.4   SGOT   --    --    --    --    --   32   ALT   --    --    --    --    --   41    < > = values in this interval not displayed. Lab Results   Component Value Date/Time    Glucose (POC) 88 02/09/2018 07:26 AM    Glucose (POC) 168 (H) 02/08/2018 09:08 PM    Glucose (POC) 127 (H) 02/08/2018 04:38 PM    Glucose (POC) 112 (H) 02/08/2018 11:38 AM    Glucose (POC) 90 02/08/2018 07:29 AM     No results for input(s): PH, PCO2, PO2, HCO3, FIO2 in the last 72 hours. No results for input(s): INR in the last 72 hours.     No lab exists for component: Monse Clement  All Micro Results     Procedure Component Value Units Date/Time    CULTURE, URINE [[id number]] Collected:  02/07/18 1423    Order Status:  Completed Specimen:  Urine from Clean catch Updated:  02/09/18 0856     Special Requests: NO SPECIAL REQUESTS        [place] Count --        <10,000  COLONIES/mL       Culture result: NO SIGNIFICANT GROWTH       CULTURE, CSF [facility] STAIN [[id number]]     Order Status:  Sent Specimen:  Cerebrospinal Fluid     URINE CULTURE HOLD SAMPLE [[id number]] Collected:  02/07/18 1423    Order Status:  Completed Specimen:  Urine from Serum Updated:  02/07/18 1451     Urine culture hold         URINE ON HOLD IN MICROBIOLOGY DEPT FOR 3 DAYS. IF UNPRESERVED URINE IS SUBMITTED, IT CANNOT BE USED FOR ADDITIONAL TESTING AFTER 24 HRS, RECOLLECTION WILL BE REQUIRED. I have reviewed notes of prior 24hr.     Other pertinent lab: None    Total time spent with patient: 04 [phone] Cas discussed with: Patient, Care Manager, Nursing Staff and Consultant/Specialist    Discussed:  Care Plan and D/C Planning    Prophylaxis:  Lovenox    Disposition:  Home w/Family           ___________________________________________________    Attending Physician: Kellie Rodriguez DO

## 2018-02-09 NOTE — PROGRESS NOTES
Bedside and Verbal shift change report given to Andria Stevenson (oncoming nurse) by Sabine Niño (offgoing nurse). Report included the following information SBAR, Kardex, Intake/Output, MAR and Recent Results.

## 2018-02-09 NOTE — PROGRESS NOTES
Spiritual Care Partner Volunteer visited patient in 5 Med Surg on 2/9/18.   Documented by:  Yanet Bah [id number]orena Amin ([id number])

## 2018-02-10 VITALS
DIASTOLIC BLOOD PRESSURE: 59 MMHG | HEART RATE: 88 BPM | HEIGHT: 61 IN | WEIGHT: 170.2 LBS | TEMPERATURE: 99 F | BODY MASS INDEX: 32.13 KG/M2 | SYSTOLIC BLOOD PRESSURE: 102 MMHG | OXYGEN SATURATION: 99 % | RESPIRATION RATE: 17 BRPM

## 2018-02-10 PROBLEM — I95.9 HYPOTENSION: Status: ACTIVE | Noted: 2018-02-10

## 2018-02-10 LAB
BASOPHILS # BLD: 0.1 K/UL (ref 0–0.1)
BASOPHILS NFR BLD: 1 % (ref 0–1)
DIFFERENTIAL METHOD BLD: ABNORMAL
EOSINOPHIL # BLD: 1.8 K/UL (ref 0–0.4)
EOSINOPHIL NFR BLD: 13 % (ref 0–7)
ERYTHROCYTE [DISTWIDTH] IN BLOOD BY AUTOMATED COUNT: 12.4 % (ref 11.5–14.5)
GLUCOSE BLD STRIP.AUTO-MCNC: 103 MG/DL (ref 65–100)
GLUCOSE BLD STRIP.AUTO-MCNC: 108 MG/DL (ref 65–100)
HCT VFR BLD AUTO: 29 % (ref 35–47)
HGB BLD-MCNC: 10.3 G/DL (ref 11.5–16)
IMM GRANULOCYTES # BLD: 0 K/UL (ref 0–0.04)
IMM GRANULOCYTES NFR BLD AUTO: 0 % (ref 0–0.5)
LYMPHOCYTES # BLD: 5 K/UL (ref 0.8–3.5)
LYMPHOCYTES NFR BLD: 37 % (ref 12–49)
MAGNESIUM SERPL-MCNC: 2.1 MG/DL (ref 1.6–2.4)
MCH RBC QN AUTO: 29.2 PG (ref 26–34)
MCHC RBC AUTO-ENTMCNC: 35.5 G/DL (ref 30–36.5)
MCV RBC AUTO: 82.2 FL (ref 80–99)
MONOCYTES # BLD: 1.2 K/UL (ref 0–1)
MONOCYTES NFR BLD: 9 % (ref 5–13)
NEUTS SEG # BLD: 5.5 K/UL (ref 1.8–8)
NEUTS SEG NFR BLD: 40 % (ref 32–75)
NRBC # BLD: 0 K/UL (ref 0–0.01)
NRBC BLD-RTO: 0 PER 100 WBC
PHOSPHATE SERPL-MCNC: 5.1 MG/DL (ref 2.6–4.7)
PLATELET # BLD AUTO: 334 K/UL (ref 150–400)
PMV BLD AUTO: 8.7 FL (ref 8.9–12.9)
RBC # BLD AUTO: 3.53 M/UL (ref 3.8–5.2)
SERVICE CMNT-IMP: ABNORMAL
SERVICE CMNT-IMP: ABNORMAL
SODIUM SERPL-SCNC: 129 MMOL/L (ref 136–145)
SODIUM SERPL-SCNC: 131 MMOL/L (ref 136–145)
WBC # BLD AUTO: 13.6 K/UL (ref 3.6–11)

## 2018-02-10 PROCEDURE — 83735 ASSAY OF MAGNESIUM: CPT | Performed by: INTERNAL MEDICINE

## 2018-02-10 PROCEDURE — 82962 GLUCOSE BLOOD TEST: CPT

## 2018-02-10 PROCEDURE — 84295 ASSAY OF SERUM SODIUM: CPT | Performed by: INTERNAL MEDICINE

## 2018-02-10 PROCEDURE — 85025 COMPLETE CBC W/AUTO DIFF WBC: CPT | Performed by: INTERNAL MEDICINE

## 2018-02-10 PROCEDURE — 84100 ASSAY OF PHOSPHORUS: CPT | Performed by: INTERNAL MEDICINE

## 2018-02-10 PROCEDURE — 74011636637 HC RX REV CODE- 636/637: Performed by: INTERNAL MEDICINE

## 2018-02-10 PROCEDURE — 36415 COLL VENOUS BLD VENIPUNCTURE: CPT | Performed by: INTERNAL MEDICINE

## 2018-02-10 PROCEDURE — 74011250637 HC RX REV CODE- 250/637: Performed by: INTERNAL MEDICINE

## 2018-02-10 RX ORDER — GABAPENTIN 100 MG/1
100 CAPSULE ORAL 2 TIMES DAILY
Qty: 60 CAP | Refills: 0 | Status: SHIPPED | OUTPATIENT
Start: 2018-02-10 | End: 2018-03-12

## 2018-02-10 RX ADMIN — GABAPENTIN 300 MG: 300 CAPSULE ORAL at 09:21

## 2018-02-10 RX ADMIN — HUMAN INSULIN 25 UNITS: 100 INJECTION, SUSPENSION SUBCUTANEOUS at 09:21

## 2018-02-10 NOTE — ROUTINE PROCESS
Bedside shift change report given to Boo White RN (oncoming nurse) by Ottoniel Durán RN (offgoing nurse). Report included the following information SBAR and Kardex.

## 2018-02-10 NOTE — PROGRESS NOTES
Called Dr Amy Donaldson regarding low blood pressure 83/59. Patient is in no distress. Told to repeat blood pressure. Will continue to monitor patient.

## 2018-02-10 NOTE — PROGRESS NOTES
[facility]  [address] [facility], [address] [place] [address]  [phone]      Medical Progress Note      NAME: Padmini Eddy   :  1960  MRM:  [id number]    Date/Time: 2/10/2018  12:44 PM       Assessment and Plan:     Hyponatremia - POA severe, now improved. Likely due to HCTZ use/SIADH/Polydipsia. Appreciate nephrology assistance. Minimal improvement with fluid restriction and holding HCTZ. Serial BMP with slow increase in sodium. Renal added tolvaptan, with improvement. Orders for q8 Na was not carried out, check now. Can go home if cleared by renal.      Headache / Neck pain - POA, resolved. ?Due to low sodium. Not meningeal. Hold any LP.      HTN (hypertension), benign / Hypotension- BP stable on low side. Hold home medications. Stop HCTZ indefinitely.      Type 2 diabetes mellitus without complication - H4U is 7.4. Continue SSI, diabetic diet and home insulin      Leukocytosis - POA, resolved, unclear etiology. Chest Xray and UA unremarkable. Monitor      Paresthesia - POA, resolved, In lower legs. May be some neuropathy, improved with gabapentin QHS       Subjective:     Chief Complaint:  Feels sleepy but fine, used blue phone    ROS:  (bold if positive, if negative)      Tolerating PT   Tolerating Diet        Objective:     Last 24hrs VS reviewed since prior progress note.  Most recent are:    Visit Vitals    /59 (BP 1 Location: Left arm, BP Patient Position: Sitting)    Pulse 88    Temp 99 °F (37.2 °C)    Resp 17    Ht 5' 1\" (1.549 m)    Wt 77.2 kg (170 lb 3.2 oz)    SpO2 99%    BMI 32.16 kg/m2     SpO2 Readings from Last 6 Encounters:   02/10/18 99%   17 100%          Intake/Output Summary (Last 24 hours) at 02/10/18 1244  Last data filed at 18 1730   Gross per 24 hour   Intake              240 ml   Output              300 ml   Net              -60 ml        Physical Exam:    Gen:  Obese, in no acute distress  HEENT:  Pink conjunctivae, PERRL, hearing intact to voice, moist mucous membranes  Neck:  Supple, without masses, thyroid non-tender  Resp:  No accessory muscle use, clear breath sounds without wheezes rales or rhonchi  Card:  No murmurs, normal S1, S2 without thrills, bruits or peripheral edema  Abd:  Soft, non-tender, non-distended, normoactive bowel sounds are present, no mass  Lymph:  No cervical or inguinal adenopathy  Musc:  No cyanosis or clubbing  Skin:  No rashes or ulcers, skin turgor is good  Neuro:  Cranial nerves are grossly intact, mild motor weakness, follows commands appropriately  Psych:  Good insight, oriented to person, place and time, alert    Telemetry reviewed:   normal sinus rhythm  __________________________________________________________________  Medications Reviewed: (see below)  Medications:     Current Facility-Administered Medications   Medication Dose Route Frequency    influenza vaccine 2017-18 (3 yrs+)(PF) (FLUZONE QUAD/FLUARIX QUAD) injection 0.5 mL  0.5 mL IntraMUSCular PRIOR TO DISCHARGE    gabapentin (NEURONTIN) capsule 300 mg  300 mg Oral BID    sodium chloride (NS) flush 5-10 mL  5-10 mL IntraVENous Q8H    sodium chloride (NS) flush 5-10 mL  5-10 mL IntraVENous PRN    acetaminophen (TYLENOL) tablet 650 mg  650 mg Oral Q4H PRN    morphine injection 2 mg  2 mg IntraVENous Q4H PRN    naloxone (NARCAN) injection 0.4 mg  0.4 mg IntraVENous PRN    diphenhydrAMINE (BENADRYL) injection 12.5 mg  12.5 mg IntraVENous Q4H PRN    prochlorperazine (COMPAZINE) injection 5 mg  5 mg IntraVENous Q8H PRN    insulin lispro (HUMALOG) injection   SubCUTAneous AC&HS    glucose chewable tablet 16 g  4 Tab Oral PRN    dextrose (D50W) injection syrg 12.5-25 g  12.5-25 g IntraVENous PRN    glucagon (GLUCAGEN) injection 1 mg  1 mg IntraMUSCular PRN    insulin NPH (NOVOLIN N, HUMULIN N) injection 25 Units  25 Units SubCUTAneous ACB&D    hydrALAZINE (APRESOLINE) 20 mg/mL injection 10 mg  10 mg IntraVENous Q6H PRN        Lab Data Reviewed: (see below)  Lab Review:     Recent Labs      02/10/18   0224  02/09/18   0514  02/08/18   0117   WBC  13.6*  14.2*  13.2*   HGB  10.3*  10.8*  10.2*   HCT  29.0*  30.0*  27.8*   PLT  334  348  316     Recent Labs      02/10/18   0224  02/09/18   1925  02/09/18   1033  02/09/18   0514  02/08/18   2250   02/07/18   1701   NA  129*  124*  123*  125*  122*   < >  121*  121*   K   --    --   4.0  3.7  3.9   < >  4.0  3.9   CL   --    --   90*  94*  90*   < >  87*  87*   CO2   --    --   26  24  25   < >  27  26   GLU   --    --   198*  101*  179*   < >  117*  118*   BUN   --    --   20  21*  18   < >  13  14   CREA   --    --   0.97  1.02  0.87   < >  0.78  0.77   CA   --    --   8.7  8.8  8.6   < >  8.5  8.5   MG  2.1   --    --   2.0   --    --    --    PHOS  5.1*   --    --   4.9*   --    --   4.5   ALB   --    --    --    --    --    --   3.3*    < > = values in this interval not displayed. Lab Results   Component Value Date/Time    Glucose (POC) 103 (H) 02/10/2018 12:25 PM    Glucose (POC) 108 (H) 02/10/2018 06:59 AM    Glucose (POC) 198 (H) 02/09/2018 09:41 PM    Glucose (POC) 118 (H) 02/09/2018 05:01 PM    Glucose (POC) 184 (H) 02/09/2018 11:30 AM     No results for input(s): PH, PCO2, PO2, HCO3, FIO2 in the last 72 hours. No results for input(s): INR in the last 72 hours.     No lab exists for component: INREXT  All Micro Results     Procedure Component Value Units Date/Time    CULTURE, URINE [[id number]] Collected:  02/07/18 1423    Order Status:  Completed Specimen:  Urine from Clean catch Updated:  02/09/18 0856     Special Requests: NO SPECIAL REQUESTS        [place] Count --        <10,000  COLONIES/mL       Culture result: NO SIGNIFICANT GROWTH       CULTURE, CSF Edwinna Kasal STAIN [[id number]] Collected:  02/07/18 1545    Order Status:  Canceled Specimen:  Cerebrospinal Fluid     URINE CULTURE HOLD SAMPLE [[id number]] Collected:  02/07/18 1423    Order Status:  Completed Specimen:  Urine from Serum Updated:  02/07/18 1450     Urine culture hold         URINE ON HOLD IN MICROBIOLOGY DEPT FOR 3 DAYS. IF UNPRESERVED URINE IS SUBMITTED, IT CANNOT BE USED FOR ADDITIONAL TESTING AFTER 24 HRS, RECOLLECTION WILL BE REQUIRED. I have reviewed notes of prior 24hr.     Other pertinent lab: none    Total time spent with patient: [address] discussed with: Patient, Family, Nursing Staff, Consultant/Specialist and >50% of time spent in counseling and coordination of care    Discussed:  Care Plan and D/C Planning    Prophylaxis:  H2B/PPI    Disposition:  Home w/Family           ___________________________________________________    Attending Physician: Alethea Castillo MD

## 2018-02-10 NOTE — ROUTINE PROCESS
Discharge instructions reviewed with patient and family using the blue phone, patient received a copy and signed our paper copy that was put into the chart. IV removed. Patient given prescription and wheeled down by volunteer with family and no additional questions.

## 2018-02-10 NOTE — DISCHARGE SUMMARY
Physician Discharge Summary     Patient ID:  Mick Rahman  [id number]  62 y.o.  1960    Admit date: 2/7/2018    Discharge date and time: 2/10/2018    Admission Diagnoses: Hyponatremia    Discharge Diagnoses:    Principal Diagnosis     Hyponatremia                                             Other Diagnoses    Headache (2/7/2018)    Neck pain (2/7/2018)    HTN (hypertension), benign (2/7/2018)    [facility] (0/6/0731)    Leukocytosis (2/7/2018)    Hypotension (2/10/2018)    Hospital Course:   Hyponatremia - POA severe, now improved. Likely due to HCTZ use/SIADH/Polydipsia. Appreciate nephrology assistance. Minimal improvement with fluid restriction and holding HCTZ. Serial BMP with slow increase in sodium. Renal added tolvaptan, with improvement. Orders for q8 Na was not carried out, check now. Can go home if cleared by renal.      Headache / Neck pain - POA, resolved. ?Due to low sodium. Not meningeal. Hold any LP.      HTN (hypertension), benign / Hypotension- BP stable on low side. Hold home medications. Stop HCTZ indefinitely.      Type 2 diabetes mellitus without complication - Q5D is 7.4. Continue SSI, diabetic diet and home NPH insulin      Leukocytosis - POA, resolved, unclear etiology. Chest Xray and UA unremarkable. Monitor      Paresthesia - POA, resolved, In lower legs. May be some neuropathy, improved with gabapentin QHS     PCP: None    Consults: Nephrology    Significant Diagnostic Studies: See Hospital Course    Discharged home in improved condition.     Discharge Exam:   /59 (BP 1 Location: Left arm, BP Patient Position: Sitting)    Pulse 88    Temp 99 °F (37.2 °C)    Resp 17    Ht 5' 1\" (1.549 m)    Wt 77.2 kg (170 lb 3.2 oz)    SpO2 99%    BMI 32.16 kg/m2      Gen:  Obese, in no acute distress  HEENT:  Pink conjunctivae, PERRL, hearing intact to voice, moist mucous membranes  Neck:  Supple, without masses, thyroid non-tender  Resp:  No accessory muscle use, clear breath sounds without wheezes rales or rhonchi  Card:  No murmurs, normal S1, S2 without thrills, bruits or peripheral edema  Abd:  Soft, non-tender, non-distended, normoactive bowel sounds are present, no mass  Lymph:  No cervical or inguinal adenopathy  Musc:  No cyanosis or clubbing  Skin:  No rashes or ulcers, skin turgor is good  Neuro:  Cranial nerves are grossly intact, mild motor weakness, follows commands appropriately  Psych:  Good insight, oriented to person, place and time, alert    Patient Instructions:   Current Discharge Medication List      START taking these medications    Details   gabapentin (NEURONTIN) 100 mg capsule Take 1 Cap by mouth two (2) times a day for 30 days. Qty: 60 Cap, Refills: 0         CONTINUE these medications which have NOT CHANGED    Details   insulin NPH (NOVOLIN N) 100 unit/mL injection 30 Units by SubCUTAneous route Before breakfast and dinner. STOP taking these medications       valsartan-hydroCHLOROthiazide (DIOVAN-HCT) 160-12.5 mg per tablet Comments:   Reason for Stopping:             Activity: Activity as tolerated  Diet: Diabetic Diet and high salt, and limited fluid intake. Wound Care: None needed    Follow-up with Dr Sara Messina in a few weeks.   Follow-up tests/labs - none    Signed:  Richard Hanna MD  2/10/2018  12:53 PM

## 2018-02-10 NOTE — DISCHARGE INSTRUCTIONS
Patient Discharge Instructions    Carolyn Diallo / [id number] : 1960    Admitted 2018 Discharged: 2/10/2018     Primary Diagnoses  Problem List as of 2/10/2018  Date Reviewed: 2018          Codes Class Noted - Resolved   Hypotension   Hyponatremia   Headache   Neck pain   HTN (hypertension), benign   Type 2 diabetes mellitus without complication ([facility]   Leukocytosis          Take Home Medications     · It is important that you take the medication exactly as they are prescribed. · Keep your medication in the bottles provided by the pharmacist and keep a list of the medication names, dosages, and times to be taken in your wallet. · Do not take other medications without consulting your doctor. What to do at Home    Recommended diet: Diabetic Diet and high salt, and limited fluid intake. Recommended activity: Activity as tolerated    If you experience worse symptoms, please follow up with any PCP. Follow-up with Dr Christian Heller in a few weeks        Information obtained by :  I understand that if any problems occur once I am at home I am to contact my physician. I understand and acknowledge receipt of the instructions indicated above.                                                                                                                                            Physician's or R.N.'s Signature                                                                  Date/Time                                                                                                                                              Patient or Representative Signature                                                          Date/Time

## 2018-02-10 NOTE — PROGRESS NOTES
Bedside and Verbal shift change report given to [address] [place] [address] Avenue (oncoming nurse) by Annelise Villegas RN (offgoing nurse). Report included the following information SBAR, Kardex, MAR, Accordion and Recent Results.

## 2022-06-21 ENCOUNTER — HOSPITAL ENCOUNTER (EMERGENCY)
Age: 62
Discharge: HOME | End: 2022-06-21
Attending: EMERGENCY MEDICINE
Payer: MEDICAID

## 2022-06-21 VITALS
DIASTOLIC BLOOD PRESSURE: 82 MMHG | WEIGHT: 165 LBS | BODY MASS INDEX: 35.6 KG/M2 | RESPIRATION RATE: 19 BRPM | HEIGHT: 57 IN | SYSTOLIC BLOOD PRESSURE: 138 MMHG | OXYGEN SATURATION: 99 % | TEMPERATURE: 97.3 F | HEART RATE: 73 BPM

## 2022-06-21 DIAGNOSIS — M54.10 RADICULAR PAIN: ICD-10-CM

## 2022-06-21 DIAGNOSIS — M54.2 NECK PAIN ON LEFT SIDE: Primary | ICD-10-CM

## 2022-06-21 PROCEDURE — 74011000250 HC RX REV CODE- 250: Performed by: EMERGENCY MEDICINE

## 2022-06-21 PROCEDURE — 99283 EMERGENCY DEPT VISIT LOW MDM: CPT

## 2022-06-21 PROCEDURE — 74011250636 HC RX REV CODE- 250/636: Performed by: EMERGENCY MEDICINE

## 2022-06-21 PROCEDURE — 75810000123 HC INJ'S ANES/STEROID AGT PERIPH NERVE

## 2022-06-21 RX ORDER — FAMOTIDINE 20 MG/1
40 TABLET, FILM COATED ORAL 2 TIMES DAILY
Qty: 16 TABLET | Refills: 0 | Status: SHIPPED | OUTPATIENT
Start: 2022-06-21 | End: 2022-06-25

## 2022-06-21 RX ORDER — BUPIVACAINE HYDROCHLORIDE 5 MG/ML
10 INJECTION, SOLUTION EPIDURAL; INTRACAUDAL
Status: COMPLETED | OUTPATIENT
Start: 2022-06-21 | End: 2022-06-21

## 2022-06-21 RX ORDER — TRIAMCINOLONE ACETONIDE 40 MG/ML
40 INJECTION, SUSPENSION INTRA-ARTICULAR; INTRAMUSCULAR ONCE
Status: COMPLETED | OUTPATIENT
Start: 2022-06-21 | End: 2022-06-21

## 2022-06-21 RX ORDER — ACETAMINOPHEN 500 MG
1000 TABLET ORAL
Qty: 20 TABLET | Refills: 0 | Status: SHIPPED | OUTPATIENT
Start: 2022-06-21

## 2022-06-21 RX ORDER — NAPROXEN 500 MG/1
500 TABLET ORAL 2 TIMES DAILY WITH MEALS
Qty: 10 TABLET | Refills: 0 | Status: SHIPPED | OUTPATIENT
Start: 2022-06-21 | End: 2022-06-26

## 2022-06-21 RX ADMIN — BUPIVACAINE HYDROCHLORIDE 50 MG: 5 INJECTION, SOLUTION EPIDURAL; INTRACAUDAL; PERINEURAL at 22:18

## 2022-06-21 RX ADMIN — TRIAMCINOLONE ACETONIDE 40 MG: 40 INJECTION, SUSPENSION INTRA-ARTICULAR; INTRAMUSCULAR at 22:18

## 2022-06-22 NOTE — ED TRIAGE NOTES
Pt with neck pain for the past 3-4 wks. Family states has been seen at 2 other facilities for the same. Not getting any better.

## 2022-06-22 NOTE — ED PROVIDER NOTES
The history is provided by the patient. Neck Pain   This is a chronic problem. Episode onset: 4 weeks ago. The problem occurs daily. The problem has been resolved. The pain is associated with nothing. There has been no fever. The pain is present in the left side. The quality of the pain is described as stabbing and shooting. The pain radiates to the left shoulder (left mid-back). The pain is moderate. Worse during: intermittent. Pertinent negatives include no visual change. She has tried NSAIDs and analgesics for the symptoms. The treatment provided no relief. Past Medical History:   Diagnosis Date    Diabetes ([address]     GERD (gastroesophageal reflux disease)     Hypertension        Past Surgical History:   Procedure Laterality Date    HX BREAST LUMPECTOMY Left          Family History:   Problem Relation Age of Onset    Diabetes Brother        Social History     Socioeconomic History    Marital status: SINGLE     Spouse name: Not on file    Number of children: Not on file    Years of education: Not on file    Highest education level: Not on file   Occupational History    Not on file   Tobacco Use    Smoking status: Never Smoker    Smokeless tobacco: Never Used   Substance and Sexual Activity    Alcohol use: No    Drug use: No    Sexual activity: Not on file   Other Topics Concern    Not on file   Social History Narrative    Not on file     Social Determinants of Health     Financial Resource Strain:     Difficulty of Paying Living Expenses: Not on file   Food Insecurity:     Worried About Running Out of Food in the Last Year: Not on file    Michael of Food in the Last Year: Not on file   Transportation Needs:     Lack of Transportation (Medical): Not on file    Lack of Transportation (Non-Medical):  Not on file   Physical Activity:     Days of Exercise per Week: Not on file    Minutes of Exercise per Session: Not on file   Stress:     Feeling of Stress : Not on file   Social Connections:     Frequency of Communication with Friends and Family: Not on file    Frequency of Social Gatherings with Friends and Family: Not on file    Attends Caodaism Services: Not on file    Active Member of Clubs or Organizations: Not on file    Attends Club or Organization Meetings: Not on file    Marital Status: Not on file   Intimate Partner Violence:     Fear of Current or Ex-Partner: Not on file    Emotionally Abused: Not on file    Physically Abused: Not on file    Sexually Abused: Not on file   Housing Stability:     Unable to Pay for Housing in the Last Year: Not on file    Number of [place] in the Last Year: Not on file    Unstable Housing in the Last Year: Not on file         ALLERGIES: Patient has no known allergies. Review of Systems   Musculoskeletal: Positive for neck pain. All other systems reviewed and are negative. Vitals:    06/21/22 2030 06/21/22 2225   BP: 139/88 138/82   Pulse: 74 73   Resp: 16 19   Temp: 97.3 °F (36.3 °C)    SpO2: 99%    Weight: 74.8 kg (165 lb)    Height: 4' 9.09\" (1.45 m)             Physical Exam  Vitals and nursing note reviewed. Constitutional:       General: She is not in acute distress. Appearance: She is well-developed. HENT:      Head: Normocephalic and atraumatic. Eyes:      Conjunctiva/sclera: Conjunctivae normal.   Cardiovascular:      Rate and Rhythm: Normal rate and regular rhythm. Pulmonary:      Effort: Pulmonary effort is normal. No respiratory distress. Abdominal:      General: There is no distension. Musculoskeletal:         General: No deformity. Normal range of motion. Cervical back: Neck supple. Comments: Tenderness of left trapezius muscle    Skin:     General: Skin is warm and dry. Neurological:      Mental Status: She is alert. Cranial Nerves: No cranial nerve deficit.    Psychiatric:         Behavior: Behavior normal.          MDM     58 y.o. female presents with neck and back pain which have been present for about a month. She has been to 2 other emergency departments nd she tells me she had XRays and CT performed which sowed no source of pain. It is intermittent. Suspect this could be muscular with radiating pain which is coming with spasms. Attempted local anesthesia to help with symptoms. Will schedule short course of NSAIDs and tylenol to help with pain. Referral to orthopedics for PT recommendations. Secondary complaint of mild epigastric pain that patient describes as gastritis. Will schedule H2 blocker as prophylaxis during NSAID therapy. Procedures    Procedure Note: Trigger Point Injection for Myofascial pain    Performed by Ciro Malhotra MD  Indication: muscle/myofascial pain  Muscle body and tendon sheath of the left cervical paraspinal and trapezius muscle(s) were injected with 0.5% bupivacaine and 40 mg kenalog under sterile technique for release of muscle spasm/pain. Patient tolerated well with immediate improvement of symptoms and no immediate complications following procedure.     CPT Code:     1 or 2 muscle bodies: 66248

## 2022-06-29 ENCOUNTER — APPOINTMENT (OUTPATIENT)
Dept: GENERAL RADIOLOGY | Age: 62
DRG: 048 | End: 2022-06-29
Attending: INTERNAL MEDICINE
Payer: MEDICAID

## 2022-06-29 ENCOUNTER — HOSPITAL ENCOUNTER (INPATIENT)
Age: 62
LOS: 8 days | Discharge: HOME | DRG: 048 | End: 2022-07-07
Attending: EMERGENCY MEDICINE | Admitting: INTERNAL MEDICINE
Payer: MEDICAID

## 2022-06-29 DIAGNOSIS — E87.1 HYPONATREMIA: Primary | ICD-10-CM

## 2022-06-29 DIAGNOSIS — R73.9 HYPERGLYCEMIA: ICD-10-CM

## 2022-06-29 PROBLEM — R11.2 NAUSEA AND VOMITING: Status: ACTIVE | Noted: 2022-06-29

## 2022-06-29 PROBLEM — E11.65 TYPE 2 DIABETES MELLITUS WITH HYPERGLYCEMIA (HCC): Status: ACTIVE | Noted: 2022-06-29

## 2022-06-29 PROBLEM — K92.2 UPPER GI BLEEDING: Status: ACTIVE | Noted: 2022-06-29

## 2022-06-29 LAB
ABO + RH BLD: NORMAL
ALBUMIN SERPL-MCNC: 3.2 G/DL (ref 3.5–5)
ALBUMIN/GLOB SERPL: 0.9 {RATIO} (ref 1.1–2.2)
ALP SERPL-CCNC: 54 U/L (ref 45–117)
ALT SERPL-CCNC: 28 U/L (ref 12–78)
ANION GAP SERPL CALC-SCNC: 11 MMOL/L (ref 5–15)
ANION GAP SERPL CALC-SCNC: 13 MMOL/L (ref 5–15)
APPEARANCE UR: CLEAR
APTT PPP: 21.7 SEC (ref 22.1–31)
AST SERPL-CCNC: 27 U/L (ref 15–37)
BACTERIA URNS QL MICRO: NEGATIVE /HPF
BASOPHILS # BLD: 0 K/UL (ref 0–0.1)
BASOPHILS NFR BLD: 0 % (ref 0–1)
BILIRUB SERPL-MCNC: 0.6 MG/DL (ref 0.2–1)
BILIRUB UR QL: NEGATIVE
BLOOD GROUP ANTIBODIES SERPL: NORMAL
BUN SERPL-MCNC: 19 MG/DL (ref 6–20)
BUN SERPL-MCNC: 21 MG/DL (ref 6–20)
BUN/CREAT SERPL: 24 (ref 12–20)
BUN/CREAT SERPL: 25 (ref 12–20)
CALCIUM SERPL-MCNC: 8.2 MG/DL (ref 8.5–10.1)
CALCIUM SERPL-MCNC: 8.9 MG/DL (ref 8.5–10.1)
CHLORIDE SERPL-SCNC: 81 MMOL/L (ref 97–108)
CHLORIDE SERPL-SCNC: 87 MMOL/L (ref 97–108)
CO2 SERPL-SCNC: 20 MMOL/L (ref 21–32)
CO2 SERPL-SCNC: 22 MMOL/L (ref 21–32)
COLOR UR: ABNORMAL
CREAT SERPL-MCNC: 0.77 MG/DL (ref 0.55–1.02)
CREAT SERPL-MCNC: 0.88 MG/DL (ref 0.55–1.02)
CREAT UR-MCNC: <13 MG/DL
DIFFERENTIAL METHOD BLD: ABNORMAL
EOSINOPHIL # BLD: 0.2 K/UL (ref 0–0.4)
EOSINOPHIL NFR BLD: 1 % (ref 0–7)
EPITH CASTS URNS QL MICRO: ABNORMAL /LPF
ERYTHROCYTE [DISTWIDTH] IN BLOOD BY AUTOMATED COUNT: 11.4 % (ref 11.5–14.5)
ETHANOL SERPL-MCNC: <10 MG/DL
GLOBULIN SER CALC-MCNC: 3.6 G/DL (ref 2–4)
GLUCOSE BLD STRIP.AUTO-MCNC: 210 MG/DL (ref 65–117)
GLUCOSE SERPL-MCNC: 183 MG/DL (ref 65–100)
GLUCOSE SERPL-MCNC: 191 MG/DL (ref 65–100)
GLUCOSE UR STRIP.AUTO-MCNC: NEGATIVE MG/DL
HCT VFR BLD AUTO: 32.5 % (ref 35–47)
HGB BLD-MCNC: ABNORMAL G/DL (ref 11.5–16)
HGB UR QL STRIP: NEGATIVE
HYALINE CASTS URNS QL MICRO: ABNORMAL /LPF (ref 0–2)
IMM GRANULOCYTES # BLD AUTO: 0.2 K/UL (ref 0–0.04)
IMM GRANULOCYTES NFR BLD AUTO: 1 % (ref 0–0.5)
INR PPP: 1 (ref 0.9–1.1)
KETONES UR QL STRIP.AUTO: NEGATIVE MG/DL
LEUKOCYTE ESTERASE UR QL STRIP.AUTO: NEGATIVE
LYMPHOCYTES # BLD: 5.8 K/UL (ref 0.8–3.5)
LYMPHOCYTES NFR BLD: 37 % (ref 12–49)
MCH RBC QN AUTO: ABNORMAL PG (ref 26–34)
MCHC RBC AUTO-ENTMCNC: ABNORMAL G/DL (ref 30–36.5)
MCV RBC AUTO: 77.8 FL (ref 80–99)
MONOCYTES # BLD: 1.3 K/UL (ref 0–1)
MONOCYTES NFR BLD: 8 % (ref 5–13)
NEUTS SEG # BLD: 8.2 K/UL (ref 1.8–8)
NEUTS SEG NFR BLD: 53 % (ref 32–75)
NITRITE UR QL STRIP.AUTO: NEGATIVE
NRBC # BLD: 0 K/UL (ref 0–0.01)
NRBC BLD-RTO: 0 PER 100 WBC
PH UR STRIP: 7.5 [PH] (ref 5–8)
PLATELET # BLD AUTO: 410 K/UL (ref 150–400)
POTASSIUM SERPL-SCNC: 3.8 MMOL/L (ref 3.5–5.1)
POTASSIUM SERPL-SCNC: 4.2 MMOL/L (ref 3.5–5.1)
PROT SERPL-MCNC: 6.8 G/DL (ref 6.4–8.2)
PROT UR STRIP-MCNC: 100 MG/DL
PROTHROMBIN TIME: 10.9 SEC (ref 9–11.1)
RBC # BLD AUTO: 4.18 M/UL (ref 3.8–5.2)
RBC #/AREA URNS HPF: ABNORMAL /HPF (ref 0–5)
RBC MORPH BLD: ABNORMAL
SERVICE CMNT-IMP: ABNORMAL
SODIUM SERPL-SCNC: 114 MMOL/L (ref 136–145)
SODIUM SERPL-SCNC: 120 MMOL/L (ref 136–145)
SP GR UR REFRACTOMETRY: 1.01 (ref 1–1.03)
SPECIMEN EXP DATE BLD: NORMAL
THERAPEUTIC RANGE,PTTT: ABNORMAL SECS (ref 58–77)
TSH SERPL DL<=0.05 MIU/L-ACNC: 0.95 UIU/ML (ref 0.36–3.74)
UR CULT HOLD, URHOLD: NORMAL
UROBILINOGEN UR QL STRIP.AUTO: 0.2 EU/DL (ref 0.2–1)
WBC # BLD AUTO: 15.7 K/UL (ref 3.6–11)
WBC URNS QL MICRO: ABNORMAL /HPF (ref 0–4)

## 2022-06-29 PROCEDURE — 83935 ASSAY OF URINE OSMOLALITY: CPT

## 2022-06-29 PROCEDURE — 82436 ASSAY OF URINE CHLORIDE: CPT

## 2022-06-29 PROCEDURE — 85025 COMPLETE CBC W/AUTO DIFF WBC: CPT

## 2022-06-29 PROCEDURE — 51702 INSERT TEMP BLADDER CATH: CPT

## 2022-06-29 PROCEDURE — 82962 GLUCOSE BLOOD TEST: CPT

## 2022-06-29 PROCEDURE — 99285 EMERGENCY DEPT VISIT HI MDM: CPT

## 2022-06-29 PROCEDURE — 85730 THROMBOPLASTIN TIME PARTIAL: CPT

## 2022-06-29 PROCEDURE — 84443 ASSAY THYROID STIM HORMONE: CPT

## 2022-06-29 PROCEDURE — 96374 THER/PROPH/DIAG INJ IV PUSH: CPT

## 2022-06-29 PROCEDURE — 74011000250 HC RX REV CODE- 250: Performed by: INTERNAL MEDICINE

## 2022-06-29 PROCEDURE — 84300 ASSAY OF URINE SODIUM: CPT

## 2022-06-29 PROCEDURE — 85610 PROTHROMBIN TIME: CPT

## 2022-06-29 PROCEDURE — 80053 COMPREHEN METABOLIC PANEL: CPT

## 2022-06-29 PROCEDURE — 36415 COLL VENOUS BLD VENIPUNCTURE: CPT

## 2022-06-29 PROCEDURE — 83930 ASSAY OF BLOOD OSMOLALITY: CPT

## 2022-06-29 PROCEDURE — 84133 ASSAY OF URINE POTASSIUM: CPT

## 2022-06-29 PROCEDURE — C9113 INJ PANTOPRAZOLE SODIUM, VIA: HCPCS | Performed by: INTERNAL MEDICINE

## 2022-06-29 PROCEDURE — 74011250636 HC RX REV CODE- 250/636: Performed by: INTERNAL MEDICINE

## 2022-06-29 PROCEDURE — 81001 URINALYSIS AUTO W/SCOPE: CPT

## 2022-06-29 PROCEDURE — 86900 BLOOD TYPING SEROLOGIC ABO: CPT

## 2022-06-29 PROCEDURE — 74019 RADEX ABDOMEN 2 VIEWS: CPT

## 2022-06-29 PROCEDURE — 82077 ASSAY SPEC XCP UR&BREATH IA: CPT

## 2022-06-29 PROCEDURE — 82570 ASSAY OF URINE CREATININE: CPT

## 2022-06-29 PROCEDURE — 74011250636 HC RX REV CODE- 250/636: Performed by: EMERGENCY MEDICINE

## 2022-06-29 PROCEDURE — 74011636637 HC RX REV CODE- 636/637: Performed by: INTERNAL MEDICINE

## 2022-06-29 PROCEDURE — 65270000029 HC RM PRIVATE

## 2022-06-29 RX ORDER — ONDANSETRON 2 MG/ML
4 INJECTION INTRAMUSCULAR; INTRAVENOUS
Status: COMPLETED | OUTPATIENT
Start: 2022-06-29 | End: 2022-06-29

## 2022-06-29 RX ORDER — MAGNESIUM SULFATE 100 %
4 CRYSTALS MISCELLANEOUS AS NEEDED
Status: DISCONTINUED | OUTPATIENT
Start: 2022-06-29 | End: 2022-07-01 | Stop reason: SDUPTHER

## 2022-06-29 RX ORDER — GABAPENTIN 100 MG/1
100 CAPSULE ORAL
COMMUNITY

## 2022-06-29 RX ORDER — INSULIN LISPRO 100 [IU]/ML
INJECTION, SOLUTION INTRAVENOUS; SUBCUTANEOUS
Status: DISCONTINUED | OUTPATIENT
Start: 2022-06-29 | End: 2022-07-07 | Stop reason: HOSPADM

## 2022-06-29 RX ORDER — INSULIN GLARGINE 100 [IU]/ML
40 INJECTION, SOLUTION SUBCUTANEOUS
Status: DISCONTINUED | OUTPATIENT
Start: 2022-06-29 | End: 2022-07-07 | Stop reason: HOSPADM

## 2022-06-29 RX ORDER — DEXTROSE MONOHYDRATE 100 MG/ML
0-250 INJECTION, SOLUTION INTRAVENOUS AS NEEDED
Status: DISCONTINUED | OUTPATIENT
Start: 2022-06-29 | End: 2022-07-01 | Stop reason: SDUPTHER

## 2022-06-29 RX ORDER — DEXTROSE MONOHYDRATE 50 MG/ML
75 INJECTION, SOLUTION INTRAVENOUS CONTINUOUS
Status: DISCONTINUED | OUTPATIENT
Start: 2022-06-29 | End: 2022-06-29

## 2022-06-29 RX ORDER — ATORVASTATIN CALCIUM 20 MG/1
40 TABLET, FILM COATED ORAL DAILY
Status: DISCONTINUED | OUTPATIENT
Start: 2022-06-30 | End: 2022-07-07 | Stop reason: HOSPADM

## 2022-06-29 RX ORDER — CHLORTHALIDONE 25 MG/1
12.5 TABLET ORAL DAILY
COMMUNITY
End: 2022-07-07

## 2022-06-29 RX ORDER — SODIUM CHLORIDE 0.9 % (FLUSH) 0.9 %
5-40 SYRINGE (ML) INJECTION AS NEEDED
Status: DISCONTINUED | OUTPATIENT
Start: 2022-06-29 | End: 2022-07-04

## 2022-06-29 RX ORDER — HYDROXYZINE 25 MG/1
25 TABLET, FILM COATED ORAL
COMMUNITY

## 2022-06-29 RX ORDER — ACETAMINOPHEN 650 MG/1
650 SUPPOSITORY RECTAL
Status: DISCONTINUED | OUTPATIENT
Start: 2022-06-29 | End: 2022-07-07 | Stop reason: HOSPADM

## 2022-06-29 RX ORDER — ALPRAZOLAM 0.25 MG/1
0.25 TABLET ORAL
Status: DISCONTINUED | OUTPATIENT
Start: 2022-06-29 | End: 2022-07-07 | Stop reason: HOSPADM

## 2022-06-29 RX ORDER — ALPRAZOLAM 0.25 MG/1
0.25 TABLET ORAL
COMMUNITY

## 2022-06-29 RX ORDER — ONDANSETRON 2 MG/ML
4 INJECTION INTRAMUSCULAR; INTRAVENOUS
Status: DISCONTINUED | OUTPATIENT
Start: 2022-06-29 | End: 2022-07-07 | Stop reason: HOSPADM

## 2022-06-29 RX ORDER — AMLODIPINE AND VALSARTAN 10; 320 MG/1; MG/1
1 TABLET ORAL DAILY
Status: DISCONTINUED | OUTPATIENT
Start: 2022-06-30 | End: 2022-06-29

## 2022-06-29 RX ORDER — ACETAMINOPHEN 325 MG/1
650 TABLET ORAL
Status: DISCONTINUED | OUTPATIENT
Start: 2022-06-29 | End: 2022-07-07 | Stop reason: HOSPADM

## 2022-06-29 RX ORDER — OMEPRAZOLE 20 MG/1
20 CAPSULE, DELAYED RELEASE ORAL DAILY
Status: ON HOLD | COMMUNITY
End: 2022-07-07 | Stop reason: SDUPTHER

## 2022-06-29 RX ORDER — DESMOPRESSIN ACETATE 4 UG/ML
2 INJECTION, SOLUTION INTRAVENOUS; SUBCUTANEOUS ONCE
Status: COMPLETED | OUTPATIENT
Start: 2022-06-29 | End: 2022-06-30

## 2022-06-29 RX ORDER — POLYETHYLENE GLYCOL 3350 17 G/17G
17 POWDER, FOR SOLUTION ORAL DAILY PRN
Status: DISCONTINUED | OUTPATIENT
Start: 2022-06-29 | End: 2022-07-07 | Stop reason: HOSPADM

## 2022-06-29 RX ORDER — INSULIN GLARGINE 100 [IU]/ML
40 INJECTION, SOLUTION SUBCUTANEOUS
COMMUNITY

## 2022-06-29 RX ORDER — ONDANSETRON 4 MG/1
4 TABLET, ORALLY DISINTEGRATING ORAL
Status: DISCONTINUED | OUTPATIENT
Start: 2022-06-29 | End: 2022-07-07 | Stop reason: HOSPADM

## 2022-06-29 RX ORDER — SODIUM CHLORIDE 0.9 % (FLUSH) 0.9 %
5-40 SYRINGE (ML) INJECTION EVERY 8 HOURS
Status: DISCONTINUED | OUTPATIENT
Start: 2022-06-29 | End: 2022-07-04

## 2022-06-29 RX ORDER — AMLODIPINE AND OLMESARTAN MEDOXOMIL 5; 20 MG/1; MG/1
5-20 TABLET ORAL
COMMUNITY

## 2022-06-29 RX ORDER — DEXTROSE MONOHYDRATE 50 MG/ML
250 INJECTION, SOLUTION INTRAVENOUS ONCE
Status: COMPLETED | OUTPATIENT
Start: 2022-06-29 | End: 2022-06-30

## 2022-06-29 RX ORDER — ATORVASTATIN CALCIUM 40 MG/1
40 TABLET, FILM COATED ORAL DAILY
COMMUNITY

## 2022-06-29 RX ADMIN — INSULIN GLARGINE 40 UNITS: 100 INJECTION, SOLUTION SUBCUTANEOUS at 23:01

## 2022-06-29 RX ADMIN — SODIUM CHLORIDE 40 MG: 9 INJECTION, SOLUTION INTRAMUSCULAR; INTRAVENOUS; SUBCUTANEOUS at 20:50

## 2022-06-29 RX ADMIN — ONDANSETRON 4 MG: 2 INJECTION INTRAMUSCULAR; INTRAVENOUS at 19:08

## 2022-06-29 RX ADMIN — SODIUM CHLORIDE 1000 ML: 9 INJECTION, SOLUTION INTRAVENOUS at 19:06

## 2022-06-29 RX ADMIN — Medication 2 UNITS: at 21:23

## 2022-06-29 RX ADMIN — Medication 10 ML: at 21:25

## 2022-06-29 NOTE — H&P
[facility]  [address], [facility] [address]  [phone]    Admission History and Physical      NAME:  Bean Robles   :   1960   MRN:  [id number]     PCP:  Eber Pettit MD     Date of service:  2022         Subjective:     CHIEF COMPLAINT: abdominal pain, vomiting    HISTORY OF PRESENT ILLNESS:     Ms. Tano Obrien is a 58 y.o.  female with past medical history of type 2 diabetes mellitus, Hypertension, GERD and chronic neck pain  is admitted with for severe hyponatremia, and upper GI bleed, . Ms. Tano Obrien presented to the Emergency Department today complaining of abdominal pain, nausea and vomIting. She says the abdominal pain started 3 days ago, epigastric, burning, nonradiating, moderate and associated with nausea and vomiting. Nausea and vomiting started earlier today and she vomited dark red blood. She states she has GERD and takes omeprazole at home. She has chronic neck pain, denies focal weakness, cough, shortness of breath, wheezing, dizziness, burning while urination or hematuria. Patient has history of prior admission with hyponatremia      Vital signs are /76 mmHg, NE: 89, temp: 98.1 °F, RR: 18 and oxygen saturation is 100% on room air. She has leukocytosis of 15.7, PT/INR/PTT are all within normal limits, sodium is 114. Past Medical History:   Diagnosis Date    Diabetes ([address]     GERD (gastroesophageal reflux disease)     Hypertension         Past Surgical History:   Procedure Laterality Date    HX BREAST LUMPECTOMY Left        Social History     Tobacco Use    Smoking status: Never Smoker    Smokeless tobacco: Never Used   Substance Use Topics    Alcohol use: No        Family History   Problem Relation Age of Onset    Diabetes Brother         No Known Allergies     Prior to Admission medications    Medication Sig Start Date End Date Taking? Authorizing Provider   atorvastatin (LIPITOR) 40 mg tablet Take 40 mg by mouth daily.    Yes Provider, Historical   omeprazole (PRILOSEC) 20 mg capsule Take 20 mg by mouth daily. Yes Provider, Historical   insulin glargine (Lantus Solostar U-100 Insulin) 100 unit/mL (3 mL) inpn 40 Units by SubCUTAneous route nightly. Yes Provider, Historical   hydrOXYzine HCL (ATARAX) 25 mg tablet Take 25 mg by mouth nightly as needed for Anxiety. Yes Provider, Historical   SITagliptin (Januvia) 100 mg tablet Take 100 mg by mouth daily. Yes Provider, Historical   ALPRAZolam (XANAX) 0.25 mg tablet Take 0.25 mg by mouth nightly as needed for Anxiety. Yes Provider, Historical   amLODIPine-Olmesartan 5-20 mg tab Take 5-20 mg by mouth. Yes Provider, Historical   gabapentin (NEURONTIN) 100 mg capsule Take 100 mg by mouth nightly as needed for Pain. Yes Provider, Historical   chlorthalidone (HYGROTON) 25 mg tablet Take 12.5 mg by mouth daily. Yes Provider, Historical   acetaminophen (TYLENOL) 500 mg tablet Take 2 Tablets by mouth every six (6) hours as needed for Pain or Fever.  6/21/22   Ace Tyson MD         Review of Systems:  (bold if positive, if negative)    Gen:  Eyes:  ENT:  CVS:  Pulm:   hemoptysisGI:  Abdominal pain, nausea, emesis,:  MS:  Skin:  Psych:  Endo:  Hem:  Renal:  Neuro:            Objective:      VITALS:    Vital signs reviewed; most recent are:    Visit Vitals  BP (!) 153/80   Pulse 85   Temp 97.8 °F (36.6 °C)   Resp 14   Wt 74.8 kg (165 lb)   SpO2 99%   BMI 35.60 kg/m²     SpO2 Readings from Last 6 Encounters:   06/29/22 99%   06/21/22 99%   02/10/18 99%   11/28/17 100%            Intake/Output Summary (Last 24 hours) at 6/29/2022 2332  Last data filed at 6/29/2022 2320  Gross per 24 hour   Intake --   Output 2425 ml   Net -2425 ml            Exam:     Physical Exam:    Gen:  Well-developed, well-nourished, in no acute distress  HEENT:  Pink conjunctivae, PERRL, hearing intact to voice, moist mucous membranes  Neck:  Supple, without masses, thyroid non-tender  Resp:  No accessory muscle use, clear breath sounds without wheezes rales or rhonchi  Card:  No murmurs, normal S1, S2 without thrills, bruits or peripheral edema  Abd:  Soft, mild epigastric tenderness, non-distended, normoactive bowel sounds are present,  Lymph:  No cervical adenopathy  Musc:  No cyanosis or clubbing  Skin:  No rashes or ulcers, skin turgor is good  Neuro:  Cranial nerves are grossly intact, no focal motor weakness, follows commands appropriately  Psych:  Good insight, oriented to person, place and time, alert       Labs:    Recent Labs     06/29/22  1750   WBC 15.7*   HGB Unable to obtain result due to hyponatremia   HCT 32.5*   *     Recent Labs     06/29/22  2237 06/29/22  1750 06/29/22  1750   *   < > 114*   K 3.8   < > 4.2   CL 87*   < > 81*   CO2 22   < > 20*   *   < > 191*   BUN 19   < > 21*   CREA 0.77   < > 0.88   CA 8.2*   < > 8.9   ALB  --   --  3.2*   TBILI  --   --  0.6   ALT  --   --  28    < > = values in this interval not displayed. Lab Results   Component Value Date/Time    Glucose (POC) 210 (H) 06/29/2022 08:59 PM    Glucose (POC) 103 (H) 02/10/2018 12:25 PM     No results for input(s): PH, PCO2, PO2, HCO3, FIO2 in the last 72 hours.   Recent Labs     06/29/22  1826   INR 1.0       Telemetry reviewed:   normal sinus rhythm       Assessment/Plan:    Active Problems:  Hyponatremia (2/7/2018)  -Sodium level is 114, this is likely chronic hyponatremia with history of prior admission and hyponatremia  -Possible underlying causes continued use of chlorthalidone, advised patient daughter not to take chlorthalidone  -Slow correction with normal saline  -We will add salt tablets if sodium will not trend up as expected     Upper GI bleeding (6/29/2022)  -Nausea and vomiting  -Burning epigastric pain  -Likely causes include gastritis versus PUD  -Leukocytosis-will get abdominal x-ray  -Protonix IV  -GI consult    Nausea and vomiting (6/29/2022)  -Protonix IV  -Zofran as needed for nausea/vomiting    Type 2 diabetes mellitus with hyperglycemia (HCC) (6/29/2022)  -Monitor blood sugar  -Correctional insulin  -Resume her Lantus 40 units daily at home            Previous medical records reviewed     Risk of deterioration: high      Total time spent with patient: 48 [address] discussed with: Patient and Family    Discussed:  Care Plan    Prophylaxis:  SCD's    Probable Disposition:  Home w/Family           ___________________________________________________    Attending Physician: Leydi Bedoya MD

## 2022-06-29 NOTE — ED PROVIDER NOTES
The history is provided by the patient. The history is limited by a language barrier. A  was used. Vomiting   This is a new problem. The current episode started 12 to 24 hours ago. The problem occurs 2 to 4 times per day. The problem has not changed since onset. The emesis has an appearance of stomach contents and coffee grounds. There has been no fever. Associated symptoms include headaches, arthralgias, myalgias and headaches. Pertinent negatives include no chills, no fever, no sweats, no abdominal pain, no diarrhea, no cough and no URI. Past Medical History:   Diagnosis Date    [address] [address]     GERD (gastroesophageal reflux disease)     Hypertension        Past Surgical History:   Procedure Laterality Date    HX BREAST LUMPECTOMY Left          Family History:   Problem Relation Age of Onset    Diabetes Brother        Social History     Socioeconomic History    Marital status: SINGLE     Spouse name: Not on file    Number of children: Not on file    Years of education: Not on file    Highest education level: Not on file   Occupational History    Not on file   Tobacco Use    Smoking status: Never Smoker    Smokeless tobacco: Never Used   Substance and Sexual Activity    Alcohol use: No    Drug use: No    Sexual activity: Not on file   Other Topics Concern    Not on file   Social History Narrative    Not on file     Social Determinants of Health     Financial Resource Strain:     Difficulty of Paying Living Expenses: Not on file   Food Insecurity:     Worried About Running Out of Food in the Last Year: Not on file    Michael of Food in the Last Year: Not on file   Transportation Needs:     Lack of Transportation (Medical): Not on file    Lack of Transportation (Non-Medical):  Not on file   Physical Activity:     Days of Exercise per Week: Not on file    Minutes of Exercise per Session: Not on file   Stress:     Feeling of Stress : Not on file   Social Connections:     Frequency of Communication with Friends and Family: Not on file    Frequency of Social Gatherings with Friends and Family: Not on file    Attends Mandaeism Services: Not on file    Active Member of Clubs or Organizations: Not on file    Attends Club or Organization Meetings: Not on file    Marital Status: Not on file   Intimate Partner Violence:     Fear of Current or Ex-Partner: Not on file    Emotionally Abused: Not on file    Physically Abused: Not on file    Sexually Abused: Not on file   Housing Stability:     Unable to Pay for Housing in the Last Year: Not on file    Number of [place] in the Last Year: Not on file    Unstable Housing in the Last Year: Not on file         ALLERGIES: Patient has no known allergies. Review of Systems   Constitutional: Negative for activity change, chills and fever. HENT: Negative for nosebleeds, sore throat, trouble swallowing and voice change. Eyes: Negative for visual disturbance. Respiratory: Negative for cough and shortness of breath. Cardiovascular: Negative for chest pain and palpitations. Gastrointestinal: Positive for vomiting. Negative for abdominal pain, constipation, diarrhea and nausea. Genitourinary: Negative for difficulty urinating, dysuria, hematuria and urgency. Musculoskeletal: Positive for arthralgias, myalgias and neck pain. Negative for back pain and neck stiffness. Skin: Negative for color change. Allergic/Immunologic: Negative for immunocompromised state. Neurological: Positive for headaches. Negative for dizziness, seizures, syncope, weakness, light-headedness and numbness. Psychiatric/Behavioral: Negative for behavioral problems, confusion, hallucinations, self-injury and suicidal ideas. Vitals:    06/29/22 1746   BP: (!) 148/76   Pulse: 89   Resp: 18   Temp: 98.1 °F (36.7 °C)   SpO2: 100%   Weight: 74.8 kg (165 lb)            Physical Exam  Vitals and nursing note reviewed. Constitutional:       General: She is not in acute distress. Appearance: She is well-developed. She is not diaphoretic. HENT:      Head: Atraumatic. Neck:      Trachea: No tracheal deviation. Cardiovascular:      Comments: Warm and well perfused  Pulmonary:      Effort: Pulmonary effort is normal. No respiratory distress. Musculoskeletal:         General: Normal range of motion. Skin:     General: Skin is warm and dry. Neurological:      Mental Status: She is alert. Coordination: Coordination normal.   Psychiatric:         Behavior: Behavior normal.         Thought Content: Thought content normal.         Judgment: Judgment normal.          MDM     This is a 77-year-old female with past medical history, review of systems, physical exam as above, presenting with complaints of generalized malaise and fatigue, nausea, vomiting, coffee-ground emesis, chronic neck pain. Daughter states recent malaise and fatigue, nausea and vomiting developed today. She denies fevers or chills, abdominal pain, chest pain or shortness of breath. Physical exam is remarkable for well-appearing elderly female, in no acute distress noted to be mildly hypertensive, afebrile without tachycardia, satting well on room air. Lab work obtained from triage indicate significant hyponatremia. Chart review reveals episodes of the same, likely thought due to thiazide diuretic. Patient's daughter who provides translation, states patient was recently started on chlorthalidone approximately 1 month ago. Discussed with patient and daughter bedside, will provide antiemetics and IV fluids, patient will require admission for further care and evaluation. Procedures    Perfect Serve Consult for Admission  7:04 PM    ED Room Number: ER10/10  Patient Name and age:  Paradise Shahid 58 y.o.  female  Working Diagnosis:   1. Hyponatremia    2.  Hyperglycemia        COVID-19 Suspicion:  no  Sepsis present:  no  Reassessment needed: no  Code Status:  Full Code  Readmission: no  Isolation Requirements:  no  Recommended Level of Care:  telemetry  Department:[facility] ED - [phone]  Other:  D/w Dr. Mal Burgess

## 2022-06-30 LAB
ANION GAP SERPL CALC-SCNC: 11 MMOL/L (ref 5–15)
ANION GAP SERPL CALC-SCNC: 12 MMOL/L (ref 5–15)
ANION GAP SERPL CALC-SCNC: 13 MMOL/L (ref 5–15)
BUN SERPL-MCNC: 16 MG/DL (ref 6–20)
BUN SERPL-MCNC: 19 MG/DL (ref 6–20)
BUN/CREAT SERPL: 20 (ref 12–20)
BUN/CREAT SERPL: 23 (ref 12–20)
BUN/CREAT SERPL: 23 (ref 12–20)
BUN/CREAT SERPL: 24 (ref 12–20)
BUN/CREAT SERPL: 24 (ref 12–20)
CALCIUM SERPL-MCNC: 8 MG/DL (ref 8.5–10.1)
CALCIUM SERPL-MCNC: 8.1 MG/DL (ref 8.5–10.1)
CALCIUM SERPL-MCNC: 8.3 MG/DL (ref 8.5–10.1)
CALCIUM SERPL-MCNC: 8.3 MG/DL (ref 8.5–10.1)
CALCIUM SERPL-MCNC: 8.4 MG/DL (ref 8.5–10.1)
CHLORIDE SERPL-SCNC: 82 MMOL/L (ref 97–108)
CHLORIDE SERPL-SCNC: 84 MMOL/L (ref 97–108)
CHLORIDE SERPL-SCNC: 84 MMOL/L (ref 97–108)
CHLORIDE SERPL-SCNC: 85 MMOL/L (ref 97–108)
CHLORIDE SERPL-SCNC: 86 MMOL/L (ref 97–108)
CHLORIDE UR-SCNC: 55 MMOL/L
CO2 SERPL-SCNC: 18 MMOL/L (ref 21–32)
CO2 SERPL-SCNC: 18 MMOL/L (ref 21–32)
CO2 SERPL-SCNC: 20 MMOL/L (ref 21–32)
CO2 SERPL-SCNC: 20 MMOL/L (ref 21–32)
CO2 SERPL-SCNC: 21 MMOL/L (ref 21–32)
CORTIS AM PEAK SERPL-MCNC: 2.7 UG/DL (ref 4.3–22.45)
CREAT SERPL-MCNC: 0.67 MG/DL (ref 0.55–1.02)
CREAT SERPL-MCNC: 0.68 MG/DL (ref 0.55–1.02)
CREAT SERPL-MCNC: 0.69 MG/DL (ref 0.55–1.02)
CREAT SERPL-MCNC: 0.81 MG/DL (ref 0.55–1.02)
CREAT SERPL-MCNC: 0.84 MG/DL (ref 0.55–1.02)
ERYTHROCYTE [DISTWIDTH] IN BLOOD BY AUTOMATED COUNT: 11.2 % (ref 11.5–14.5)
GLUCOSE BLD STRIP.AUTO-MCNC: 103 MG/DL (ref 65–117)
GLUCOSE BLD STRIP.AUTO-MCNC: 131 MG/DL (ref 65–117)
GLUCOSE BLD STRIP.AUTO-MCNC: 142 MG/DL (ref 65–117)
GLUCOSE BLD STRIP.AUTO-MCNC: 164 MG/DL (ref 65–117)
GLUCOSE SERPL-MCNC: 128 MG/DL (ref 65–100)
GLUCOSE SERPL-MCNC: 142 MG/DL (ref 65–100)
GLUCOSE SERPL-MCNC: 142 MG/DL (ref 65–100)
GLUCOSE SERPL-MCNC: 180 MG/DL (ref 65–100)
GLUCOSE SERPL-MCNC: 95 MG/DL (ref 65–100)
HCT VFR BLD AUTO: 28.5 % (ref 35–47)
HGB BLD-MCNC: 10.7 G/DL (ref 11.5–16)
MAGNESIUM SERPL-MCNC: 1.7 MG/DL (ref 1.6–2.4)
MCH RBC QN AUTO: 29.9 PG (ref 26–34)
MCHC RBC AUTO-ENTMCNC: 37.5 G/DL (ref 30–36.5)
MCV RBC AUTO: 79.6 FL (ref 80–99)
NRBC # BLD: 0 K/UL (ref 0–0.01)
NRBC BLD-RTO: 0 PER 100 WBC
OSMOLALITY SERPL: 248 MOSM/KG H2O
OSMOLALITY UR: 209 MOSM/KG H2O
OSMOLALITY UR: 376 MOSM/KG H2O
PLATELET # BLD AUTO: 346 K/UL (ref 150–400)
PMV BLD AUTO: 8.6 FL (ref 8.9–12.9)
POTASSIUM SERPL-SCNC: 3.4 MMOL/L (ref 3.5–5.1)
POTASSIUM SERPL-SCNC: 3.5 MMOL/L (ref 3.5–5.1)
POTASSIUM SERPL-SCNC: 3.8 MMOL/L (ref 3.5–5.1)
POTASSIUM SERPL-SCNC: 4.1 MMOL/L (ref 3.5–5.1)
POTASSIUM SERPL-SCNC: 4.6 MMOL/L (ref 3.5–5.1)
POTASSIUM UR-SCNC: 36 MMOL/L
RBC # BLD AUTO: 3.58 M/UL (ref 3.8–5.2)
SERVICE CMNT-IMP: ABNORMAL
SERVICE CMNT-IMP: NORMAL
SODIUM SERPL-SCNC: 113 MMOL/L (ref 136–145)
SODIUM SERPL-SCNC: 114 MMOL/L (ref 136–145)
SODIUM SERPL-SCNC: 116 MMOL/L (ref 136–145)
SODIUM SERPL-SCNC: 116 MMOL/L (ref 136–145)
SODIUM SERPL-SCNC: 117 MMOL/L (ref 136–145)
SODIUM UR-SCNC: 55 MMOL/L
SODIUM UR-SCNC: 75 MMOL/L
WBC # BLD AUTO: 13.1 K/UL (ref 3.6–11)

## 2022-06-30 PROCEDURE — 84300 ASSAY OF URINE SODIUM: CPT

## 2022-06-30 PROCEDURE — 82533 TOTAL CORTISOL: CPT

## 2022-06-30 PROCEDURE — C9113 INJ PANTOPRAZOLE SODIUM, VIA: HCPCS | Performed by: INTERNAL MEDICINE

## 2022-06-30 PROCEDURE — 74011250636 HC RX REV CODE- 250/636: Performed by: NURSE PRACTITIONER

## 2022-06-30 PROCEDURE — 80048 BASIC METABOLIC PNL TOTAL CA: CPT

## 2022-06-30 PROCEDURE — 74011636637 HC RX REV CODE- 636/637: Performed by: INTERNAL MEDICINE

## 2022-06-30 PROCEDURE — 74011000250 HC RX REV CODE- 250: Performed by: INTERNAL MEDICINE

## 2022-06-30 PROCEDURE — 2709999900 HC NON-CHARGEABLE SUPPLY

## 2022-06-30 PROCEDURE — 82962 GLUCOSE BLOOD TEST: CPT

## 2022-06-30 PROCEDURE — 74011250636 HC RX REV CODE- 250/636: Performed by: HOSPITALIST

## 2022-06-30 PROCEDURE — 74011000258 HC RX REV CODE- 258: Performed by: NURSE PRACTITIONER

## 2022-06-30 PROCEDURE — 74011250637 HC RX REV CODE- 250/637: Performed by: INTERNAL MEDICINE

## 2022-06-30 PROCEDURE — 83935 ASSAY OF URINE OSMOLALITY: CPT

## 2022-06-30 PROCEDURE — 65610000006 HC RM INTENSIVE CARE

## 2022-06-30 PROCEDURE — 83735 ASSAY OF MAGNESIUM: CPT

## 2022-06-30 PROCEDURE — 85027 COMPLETE CBC AUTOMATED: CPT

## 2022-06-30 PROCEDURE — 36415 COLL VENOUS BLD VENIPUNCTURE: CPT

## 2022-06-30 PROCEDURE — 74011250636 HC RX REV CODE- 250/636: Performed by: INTERNAL MEDICINE

## 2022-06-30 PROCEDURE — 74011250636 HC RX REV CODE- 250/636: Performed by: FAMILY MEDICINE

## 2022-06-30 RX ORDER — POTASSIUM CHLORIDE 7.45 MG/ML
10 INJECTION INTRAVENOUS
Status: COMPLETED | OUTPATIENT
Start: 2022-06-30 | End: 2022-06-30

## 2022-06-30 RX ORDER — DEXTROSE MONOHYDRATE 50 MG/ML
75 INJECTION, SOLUTION INTRAVENOUS CONTINUOUS
Status: DISCONTINUED | OUTPATIENT
Start: 2022-06-30 | End: 2022-06-30

## 2022-06-30 RX ORDER — 3% SODIUM CHLORIDE 3 G/100ML
50 INJECTION, SOLUTION INTRAVENOUS CONTINUOUS
Status: DISPENSED | OUTPATIENT
Start: 2022-06-30 | End: 2022-06-30

## 2022-06-30 RX ORDER — 3% SODIUM CHLORIDE 3 G/100ML
45 INJECTION, SOLUTION INTRAVENOUS CONTINUOUS
Status: DISPENSED | OUTPATIENT
Start: 2022-06-30 | End: 2022-06-30

## 2022-06-30 RX ORDER — 3% SODIUM CHLORIDE 3 G/100ML
30 INJECTION, SOLUTION INTRAVENOUS CONTINUOUS
Status: DISCONTINUED | OUTPATIENT
Start: 2022-06-30 | End: 2022-07-01 | Stop reason: SDUPTHER

## 2022-06-30 RX ADMIN — SODIUM CHLORIDE 40 MG: 9 INJECTION, SOLUTION INTRAMUSCULAR; INTRAVENOUS; SUBCUTANEOUS at 10:55

## 2022-06-30 RX ADMIN — POTASSIUM CHLORIDE 10 MEQ: 7.46 INJECTION, SOLUTION INTRAVENOUS at 16:11

## 2022-06-30 RX ADMIN — SODIUM CHLORIDE 50 ML/HR: 3 INJECTION, SOLUTION INTRAVENOUS at 11:07

## 2022-06-30 RX ADMIN — AMLODIPINE BESYLATE: 5 TABLET ORAL at 10:54

## 2022-06-30 RX ADMIN — SODIUM CHLORIDE 40 MG: 9 INJECTION, SOLUTION INTRAMUSCULAR; INTRAVENOUS; SUBCUTANEOUS at 20:35

## 2022-06-30 RX ADMIN — SODIUM CHLORIDE 25 ML/HR: 3 INJECTION, SOLUTION INTRAVENOUS at 18:27

## 2022-06-30 RX ADMIN — Medication 10 ML: at 22:50

## 2022-06-30 RX ADMIN — ATORVASTATIN CALCIUM 40 MG: 20 TABLET, FILM COATED ORAL at 10:55

## 2022-06-30 RX ADMIN — POTASSIUM CHLORIDE 10 MEQ: 7.46 INJECTION, SOLUTION INTRAVENOUS at 12:30

## 2022-06-30 RX ADMIN — Medication 10 ML: at 06:00

## 2022-06-30 RX ADMIN — POTASSIUM CHLORIDE 10 MEQ: 7.46 INJECTION, SOLUTION INTRAVENOUS at 11:03

## 2022-06-30 RX ADMIN — Medication 10 ML: at 17:33

## 2022-06-30 RX ADMIN — ACETAMINOPHEN 650 MG: 325 TABLET ORAL at 21:00

## 2022-06-30 RX ADMIN — DEXTROSE MONOHYDRATE 250 ML/HR: 50 INJECTION, SOLUTION INTRAVENOUS at 00:52

## 2022-06-30 RX ADMIN — POTASSIUM CHLORIDE 10 MEQ: 7.46 INJECTION, SOLUTION INTRAVENOUS at 14:24

## 2022-06-30 RX ADMIN — SODIUM CHLORIDE 30 ML/HR: 3 INJECTION, SOLUTION INTRAVENOUS at 22:40

## 2022-06-30 RX ADMIN — Medication 2 UNITS: at 17:32

## 2022-06-30 RX ADMIN — DESMOPRESSIN ACETATE 2 MCG: 4 SOLUTION INTRAVENOUS at 00:54

## 2022-06-30 RX ADMIN — INSULIN GLARGINE 40 UNITS: 100 INJECTION, SOLUTION SUBCUTANEOUS at 22:55

## 2022-06-30 RX ADMIN — ONDANSETRON 4 MG: 2 INJECTION INTRAMUSCULAR; INTRAVENOUS at 20:35

## 2022-06-30 NOTE — PROGRESS NOTES
Jorge A Back  YOB: 1960      Assessment & Plan:     Hypo-osmolar hyponatremia d/t chlorthalidone induced  HTN  DM-2    -NA improved from 114>120 in 4 hr and rapidly lower down with D5W and DDVAP 2 mcg one dose.  -current  and K 3.5  -I will give 3% saline 50 cc/hr for 2 hr.   -check BMP q4 hr  -goal of  by today 6 pm  -if NA over corrected then try to give D5W first then DDVAP  -Controlled pain, N/V which inappropriately stimulate ADH  -plan d/w patient via Greek interpretor and hospitalist           Subjective:   CC: f/u Hyponatremia  HPI: Patient seen and c/o headache and very hungry  ROS:  Current Facility-Administered Medications   Medication Dose Route Frequency    potassium chloride 10 mEq in 100 ml IVPB  10 mEq IntraVENous Q1H    sodium chloride (NS) flush 5-40 mL  5-40 mL IntraVENous Q8H    sodium chloride (NS) flush 5-40 mL  5-40 mL IntraVENous PRN    acetaminophen (TYLENOL) tablet 650 mg  650 mg Oral Q6H PRN    Or    acetaminophen (TYLENOL) suppository 650 mg  650 mg Rectal Q6H PRN    polyethylene glycol (MIRALAX) packet 17 g  17 g Oral DAILY PRN    ondansetron (ZOFRAN ODT) tablet 4 mg  4 mg Oral Q8H PRN    Or    ondansetron (ZOFRAN) injection 4 mg  4 mg IntraVENous Q6H PRN    pantoprazole (PROTONIX) 40 mg in 0.9% sodium chloride 10 mL injection  40 mg IntraVENous Q12H    ALPRAZolam (XANAX) tablet 0.25 mg  0.25 mg Oral QHS PRN    atorvastatin (LIPITOR) tablet 40 mg  40 mg Oral DAILY    insulin glargine (LANTUS) injection 40 Units  40 Units SubCUTAneous QHS    insulin lispro (HUMALOG) injection   SubCUTAneous AC&HS    glucose chewable tablet 16 g  4 Tablet Oral PRN    glucagon (GLUCAGEN) injection 1 mg  1 mg IntraMUSCular PRN    dextrose 10% infusion 0-250 mL  0-250 mL IntraVENous PRN    amLODIPine/valsartan (EXFORGE) 10/320 mg   Oral DAILY     Current Outpatient Medications   Medication Sig    atorvastatin (LIPITOR) 40 mg tablet Take 40 mg by mouth daily.     omeprazole (PRILOSEC) 20 mg capsule Take 20 mg by mouth daily.  insulin glargine (Lantus Solostar U-100 Insulin) 100 unit/mL (3 mL) inpn 40 Units by SubCUTAneous route nightly.  hydrOXYzine HCL (ATARAX) 25 mg tablet Take 25 mg by mouth nightly as needed for Anxiety.  SITagliptin (Januvia) 100 mg tablet Take 100 mg by mouth daily.  ALPRAZolam (XANAX) 0.25 mg tablet Take 0.25 mg by mouth nightly as needed for Anxiety.  amLODIPine-Olmesartan 5-20 mg tab Take 5-20 mg by mouth.  gabapentin (NEURONTIN) 100 mg capsule Take 100 mg by mouth nightly as needed for Pain.  chlorthalidone (HYGROTON) 25 mg tablet Take 12.5 mg by mouth daily.  acetaminophen (TYLENOL) 500 mg tablet Take 2 Tablets by mouth every six (6) hours as needed for Pain or Fever. Objective:     Vitals:  Blood pressure (!) 153/78, pulse 87, temperature 98.7 °F (37.1 °C), resp. rate 17, weight 74.8 kg (165 lb), SpO2 99 %. Temp (24hrs), Av.2 °F (36.8 °C), Min:97.8 °F (36.6 °C), Max:98.7 °F (37.1 °C)      Intake and Output:  No intake/output data recorded.  1901 -  0700  In: -   Out: 3250 [Urine:3250]    Physical Exam:                   Physical Examination:   GENERAL ASSESSMENT: NAD  HEENT:Nontraumatic   CHEST: CTA  HEART: S1S2  ABDOMEN: Soft,NT,  :[place]:   EXTREMITY: no EDEMA  NEURO:Grossly non focal          ECG/rhythm:    Data Review      No results for input(s): TNIPOC in the last 72 hours. No lab exists for component: ITNL   No results for input(s): CPK, CKMB, TROIQ in the last 72 hours.   Recent Labs     22  0600 22  0200 22  2237 22  1750 22  1750   * 117* 120*   < > 114*   K 3.5 3.8 3.8   < > 4.2   CL 84* 86* 87*   < > 81*   CO2 20* 20* 22   < > 20*   BUN 16 19 19   < > 21*   CREA 0.67 0.84 0.77   < > 0.88   GLU 95 180* 183*   < > 191*   MG  --  1.7  --   --   --    CA 8.4* 8.1* 8.2*   < > 8.9   ALB  --   --   --   --  3.2*   WBC  --  13.1*  --   --  15.7*   HGB  -- 10.7*  --   --  Unable to obtain result due to hyponatremia   HCT  --  28.5*  --   --  32.5*   PLT  --  346  --   --  410*    < > = values in this interval not displayed. Recent Labs     06/29/22 1826   INR 1.0   PTP 10.9   APTT 21.7*     Needs: urine analysis, urine sodium, protein and creatinine  Lab Results   Component Value Date/Time    Sodium,urine random 75 06/29/2022 09:22 PM    Creatinine, urine <13.00 06/29/2022 10:37 PM               Discussed with:  Staff    : Chloe Gonzalez MD  6/30/2022        [facility]:  [email or web address]  Rosalie Persaud office:  [address] [address] [place] [address]  Phone: [phone]  Fax :     [phone]    [place] office:  [address] [address] [address]  Phone - [phone]  Fax - [phone]

## 2022-06-30 NOTE — PROGRESS NOTES
Sohail Calhoun [facility] [address]  [address] [facility], [address] [place] [address]  [phone] [address] Adult  Hospitalist Group                                                                                          Hospitalist Progress Note  Cruz Martinez MD        Date of Service:  2022  NAME:  Larissa Cohen  :  1960  MRN:  [id number]      Admission Summary:   58 to female admitted with hyponatremia    Interval history / Subjective:    feels well, NAD     Assessment & Plan:     Hyponatremia (2018)  -likely due to chronic hctz use  -s/p 3% ns and DDAVP but still not improving  -nephrology following     Upper GI bleeding (2022)  -patient reports this is not correct,  misinterpreted what she was saying.  -cont regular diet, cancel GI consult     Nausea and vomiting (2022)  -likely from hyponatremia  -now tolerating diet  -Protonix IV  -Zofran as needed for nausea/vomiting     Type 2 diabetes mellitus with hyperglycemia ([facility] [address] (2022)  -Monitor blood sugar  -Correctional insulin  -Resume her Lantus 40 units daily at home    HTN  -cont exforge  -dc hctz    Code status: full  DVT prophylaxis: [facility]       Hospital Problems  Date Reviewed: 2018          Codes Class Noted POA    Nausea and vomiting ICD-10-CM: R11.2  ICD-9-CM: 787.01  2022 Yes        Type 2 diabetes mellitus with hyperglycemia ([facility] [address] ICD-10-CM: E11.65  ICD-9-CM: 250.00  2022 Yes        Upper GI bleeding ICD-10-CM: K92.2  ICD-9-CM: 578.9  2022 Yes        Hyponatremia ICD-10-CM: E87.1  ICD-9-CM: 276.1  2018 Unknown                Review of Systems:   A comprehensive review of systems was negative except for that written in the HPI. Vital Signs:    Last 24hrs VS reviewed since prior progress note.  Most recent are:  Visit Vitals  /89   Pulse 76   Temp 98.4 °F (36.9 °C)   Resp 20   Wt 74.8 kg (165 lb)   SpO2 99%   BMI 35.60 kg/m²         Intake/Output Summary (Last 24 hours) at 6/30/2022 1719  Last data filed at 6/30/2022 0059  Gross per 24 hour   Intake --   Output 3250 ml   Net -3250 ml        Physical Examination:             Constitutional:  No acute distress, cooperative, pleasant    ENT:  Oral mucosa moist, oropharynx benign. Resp:  CTA bilaterally. No wheezing/rhonchi/rales. No accessory muscle use   CV:  Regular rhythm, normal rate, no murmurs, gallops, rubs    GI:  Soft, non distended, non tender. normoactive bowel sounds, no hepatosplenomegaly     Musculoskeletal:  No edema, warm, 2+ pulses throughout    Neurologic:  Moves all extremities. AAOx3, CN II-XII reviewed     Psych:  Good insight, Not anxious nor agitated. Data Review:    Review and/or order of clinical lab test      Labs:     Recent Labs     06/30/22  0200 06/29/22  1750   WBC 13.1* 15.7*   HGB 10.7* Unable to obtain result due to hyponatremia   HCT 28.5* 32.5*    410*     Recent Labs     06/30/22  1544 06/30/22  1051 06/30/22  0600 06/30/22  0200 06/30/22  0200   * 116* 116*   < > 117*   K 4.6 3.4* 3.5   < > 3.8   CL 85* 82* 84*   < > 86*   CO2 18* 21 20*   < > 20*   BUN 16 16 16   < > 19   CREA 0.68 0.81 0.67   < > 0.84   * 128* 95   < > 180*   CA 8.0* 8.3* 8.4*   < > 8.1*   MG  --   --   --   --  1.7    < > = values in this interval not displayed. Recent Labs     06/29/22  1750   ALT 28   AP 54   TBILI 0.6   TP 6.8   ALB 3.2*   GLOB 3.6     Recent Labs     06/29/22  1826   INR 1.0   PTP 10.9   APTT 21.7*      No results for input(s): FE, TIBC, PSAT, FERR in the last 72 hours. No results found for: FOL, RBCF   No results for input(s): PH, PCO2, PO2 in the last 72 hours. No results for input(s): CPK, CKNDX, TROIQ in the last 72 hours.     No lab exists for component: CPKMB  Lab Results   Component Value Date/Time    Cholesterol, total 141 02/09/2018 05:14 AM    HDL Cholesterol 36 02/09/2018 05:14 AM    LDL, calculated 75.8 02/09/2018 05:14 AM    Triglyceride 146 02/09/2018 05:14 AM    CHOL/HDL Ratio 3.9 02/09/2018 05:14 AM     Lab Results   Component Value Date/Time    Glucose (POC) 164 (H) 06/30/2022 05:07 PM    Glucose (POC) 103 06/30/2022 09:37 AM    Glucose (POC) 131 (H) 06/30/2022 04:06 AM    Glucose (POC) 210 (H) 06/29/2022 08:59 PM    Glucose (POC) 103 (H) 02/10/2018 12:25 PM     Lab Results   Component Value Date/Time    Color PINK 06/29/2022 08:19 PM    Appearance CLEAR 06/29/2022 08:19 PM    Specific gravity 1.013 06/29/2022 08:19 PM    pH (UA) 7.5 06/29/2022 08:19 PM    Protein 100 (A) 06/29/2022 08:19 PM    Glucose Negative 06/29/2022 08:19 PM    Ketone Negative 06/29/2022 08:19 PM    Bilirubin Negative 06/29/2022 08:19 PM    Urobilinogen 0.2 06/29/2022 08:19 PM    Nitrites Negative 06/29/2022 08:19 PM    Leukocyte Esterase Negative 06/29/2022 08:19 PM    Epithelial cells FEW 06/29/2022 08:19 PM    Bacteria Negative 06/29/2022 08:19 PM    WBC 0-4 06/29/2022 08:19 PM    RBC 0-5 06/29/2022 08:19 PM         Medications Reviewed:     Current Facility-Administered Medications   Medication Dose Route Frequency    sodium chloride (NS) flush 5-40 mL  5-40 mL IntraVENous Q8H    sodium chloride (NS) flush 5-40 mL  5-40 mL IntraVENous PRN    acetaminophen (TYLENOL) tablet 650 mg  650 mg Oral Q6H PRN    Or    acetaminophen (TYLENOL) suppository 650 mg  650 mg Rectal Q6H PRN    polyethylene glycol (MIRALAX) packet 17 g  17 g Oral DAILY PRN    ondansetron (ZOFRAN ODT) tablet 4 mg  4 mg Oral Q8H PRN    Or    ondansetron (ZOFRAN) injection 4 mg  4 mg IntraVENous Q6H PRN    pantoprazole (PROTONIX) 40 mg in 0.9% sodium chloride 10 mL injection  40 mg IntraVENous Q12H    ALPRAZolam (XANAX) tablet 0.25 mg  0.25 mg Oral QHS PRN    atorvastatin (LIPITOR) tablet 40 mg  40 mg Oral DAILY    insulin glargine (LANTUS) injection 40 Units  40 Units SubCUTAneous QHS    insulin lispro (HUMALOG) injection   SubCUTAneous AC&HS    glucose chewable tablet 16 g  4 Tablet Oral PRN    glucagon (GLUCAGEN) injection 1 mg  1 mg IntraMUSCular PRN    dextrose 10% infusion 0-250 mL  0-250 mL IntraVENous PRN    amLODIPine/valsartan (EXFORGE) 10/320 mg   Oral DAILY     Current Outpatient Medications   Medication Sig    atorvastatin (LIPITOR) 40 mg tablet Take 40 mg by mouth daily.  omeprazole (PRILOSEC) 20 mg capsule Take 20 mg by mouth daily.  insulin glargine (Lantus Solostar U-100 Insulin) 100 unit/mL (3 mL) inpn 40 Units by SubCUTAneous route nightly.  hydrOXYzine HCL (ATARAX) 25 mg tablet Take 25 mg by mouth nightly as needed for Anxiety.  SITagliptin (Januvia) 100 mg tablet Take 100 mg by mouth daily.  ALPRAZolam (XANAX) 0.25 mg tablet Take 0.25 mg by mouth nightly as needed for Anxiety.  amLODIPine-Olmesartan 5-20 mg tab Take 5-20 mg by mouth.  gabapentin (NEURONTIN) 100 mg capsule Take 100 mg by mouth nightly as needed for Pain.  chlorthalidone (HYGROTON) 25 mg tablet Take 12.5 mg by mouth daily.     acetaminophen (TYLENOL) 500 mg tablet Take 2 Tablets by mouth every six (6) hours as needed for Pain or Fever.     ______________________________________________________________________  EXPECTED LENGTH OF STAY: - - -  ACTUAL LENGTH OF STAY:          1                 Jemima Centeno MD

## 2022-06-30 NOTE — ED NOTES
Pt repeatedly calling out asking for food. PT currently NPO for possible EGD. Pt is adamantly refusing an EGD. Spoke with Dr Denise Bach and updated on pts request. States that if GI cancels EGD then can order regular diet. Spoke with GI who has not been to see pt and had no documentation of consult to see pt from last night. Pt threatening to leave AMA if does not receive food. Diet order placed since pt refusing EGD. MD aware. Pt updated that diet order has been placed. Dr Cris Gonzalez at bedside to see pt. States she needs to be upgraded to ICU. Dr. Denise Bach aware.

## 2022-06-30 NOTE — PROGRESS NOTES
Nephrology Night Cover  6p-6a    Patient serum sodium overcorrecting   Repeat 120<114  S/p 1 liter NS in ED  2 liters urine output   Will order desmopressin and D5W  Recheck serum sodium at 0200  Please notify on call nephrology team with repeat results     42-30-72-51: Repeat sodium 117, continue to monitor trends, next check at [address] [facility] NP  [facility]

## 2022-06-30 NOTE — ED NOTES
TRANSFER - OUT REPORT:    Verbal report given to Jemma (name) on Joyce Bear  being transferred to ICU (unit) for routine progression of care       Report consisted of patients Situation, Background, Assessment and   Recommendations(SBAR). Information from the following report(s) SBAR, Kardex, ED Summary, [facility] and Recent Results was reviewed with the receiving nurse. Lines:   Peripheral IV 06/29/22 Right Hand (Active)   Site Assessment Clean, dry, & intact 06/29/22 1747   Phlebitis Assessment 0 06/29/22 1747   Infiltration Assessment 0 06/29/22 1747   Dressing Status Clean, dry, & intact 06/29/22 1747   Dressing Type Transparent 06/29/22 1747   Hub Color/Line Status Pink;Flushed;Patent 06/29/22 1747   Action Taken Blood drawn 06/29/22 1747       Peripheral IV 06/30/22 Left; Inferior Forearm (Active)   Site Assessment Clean, dry, & intact 06/30/22 0051   Phlebitis Assessment 0 06/30/22 0051   Infiltration Assessment 0 06/30/22 0051   Dressing Status Clean, dry, & intact 06/30/22 0051        Opportunity for questions and clarification was provided.       Patient transported with:   Monitor

## 2022-06-30 NOTE — PROGRESS NOTES
1900 Bedside shift change report given to Ld Beach RN (oncoming nurse) by Jaylan Suresh RN (offgoing nurse). Report included the following information SBAR, Kardex, ED Summary, Procedure Summary, Intake/Output, MAR, Recent Results, Med Rec Status, Cardiac Rhythm SR, Alarm Parameters  and Quality Measures. Patient resting quietly in bed. Family/visitors at bedside. Will assess. Primary Nurse Cindy Goff RN and Jaylan Suresh RN performed a dual skin assessment on this patient No impairment noted. Michael score is; see flow sheet. 0100 BMP sent. Lab unable to process sample due to downtime. 0247 Sodium resulted; 114. Dr. Sarah Mcmauns notified. Will repeat BMP in 4 hours. 0700 Bedside shift change report given to MANDEEP Hernandez (oncoming nurse) by Ld Beach RN (offgoing nurse). Report included the following information SBAR, Kardex, ED Summary, Procedure Summary, Intake/Output, MAR, Recent Results, Med Rec Status, Cardiac Rhythm SR, Alarm Parameters  and Quality Measures.

## 2022-06-30 NOTE — ED NOTES
Daughter at bedside, will be leaving for the night ask to be called if there is a status change with pt or bed change, since pt has trouble using the phone herself.

## 2022-06-30 NOTE — CONSULTS
NEPHROLOGY CONSULT NOTE     Patient: Mary Lilly MRN: [id number]  PCP: Bharti Chen MD   :     1960  Age:   58 y.o. Sex:  female      Referring physician: Jason Bellamy MD  Reason for consultation: 58 y.o. female with Hyponatremia [E87.1]  Type 2 diabetes mellitus with hyperglycemia ([facility] [address] [E11.65]  Nausea and vomiting [M16.0] complicated by FRANCISCO   Admission Date: 2022  5:48 PM  LOS: 0 days     DISCUSSION / PLAN :   Hyponatremia, Acute  - Presenting serum sodium 114, has had similar episode in the past likely to HCTZ  - Suspect secondary to recent start of chlorthalidone 1- month ago with nausea/abdominal pain contributing  - Patient received 1L NS bolus in the ED, concerns for rapid overcorrection  - Check stat BMP now  - Place rodgers catheter for accurate urinary output measurement  - check urine chemistries, lytes and osmo  - Check serum osmo, TSH and am cortisol  - Recommend ICU level of care   - Further treatment dependent on repeat BMP, urine results, may need desmopressin if overcorrects  - Goal rate of correction 6 mmol/L in 24 hours  - Hold further IVF  - Discontinue chlorthalidone   - May need 3%  - Trend serial sodiums q4 hours, call results to on call nephrology team    Hypertension   - Stop chlorthalidone  - continue amlodipine-valsartan    Diabetes  - SSI per primary team    GI bleed  N/v  Abdominal pain  - prn antiemetics       Subjective:   HPI: Mary Lilly is a 58 y.o.  female who has a PMHx significant for hypertension, diabetes, GERD, hyponatremia who presented to the ED with chief complaints of nausea, abdominal pain and coffee ground emesis that started today. She denies any CP/SOB, edema, urinary changes. Lab evaluation in the ED revealed a sodium level of 114, otherwise normal chem panel. Patient is alert and oriented, vitals are stable, no headaches, seizures or dizziness. Does endorse fatigue and weakness.   Nephrology was consulted for the management of acute hyponatremia  - Presenting sodium 114, had similar episode in the past in 2018, likely related to HCTZ use and nausea  - Recently started on chlorthalidone 1-month ago  - No significant head trauma, seizure or stroke. Denies any history of CHF, thyroid or adrenal disease. Past Medical Hx:   Past Medical History:   Diagnosis Date    Diabetes ([facility] [address]     GERD (gastroesophageal reflux disease)     Hypertension         Past Surgical Hx:     Past Surgical History:   Procedure Laterality Date    HX BREAST LUMPECTOMY Left        Medications:  Prior to Admission medications    Medication Sig Start Date End Date Taking? Authorizing Provider   atorvastatin (LIPITOR) 40 mg tablet Take 40 mg by mouth daily. Yes Provider, Historical   omeprazole (PRILOSEC) 20 mg capsule Take 20 mg by mouth daily. Yes Provider, Historical   insulin glargine (Lantus Solostar U-100 Insulin) 100 unit/mL (3 mL) inpn 40 Units by SubCUTAneous route nightly. Yes Provider, Historical   hydrOXYzine HCL (ATARAX) 25 mg tablet Take 25 mg by mouth nightly as needed for Anxiety. Yes Provider, Historical   SITagliptin (Januvia) 100 mg tablet Take 100 mg by mouth daily. Yes Provider, Historical   ALPRAZolam (XANAX) 0.25 mg tablet Take 0.25 mg by mouth nightly as needed for Anxiety. Yes Provider, Historical   amLODIPine-Olmesartan 5-20 mg tab Take 5-20 mg by mouth. Yes Provider, Historical   gabapentin (NEURONTIN) 100 mg capsule Take 100 mg by mouth nightly as needed for Pain. Yes Provider, Historical   chlorthalidone (HYGROTON) 25 mg tablet Take 12.5 mg by mouth daily. Yes Provider, Historical   acetaminophen (TYLENOL) 500 mg tablet Take 2 Tablets by mouth every six (6) hours as needed for Pain or Fever. 6/21/22   Judy Roger MD       No Known Allergies    Social Hx:  reports that she has never smoked. She has never used smokeless tobacco. She reports that she does not drink alcohol and does not use drugs.      Family History Problem Relation Age of Onset    Diabetes Brother        Review of Systems:  A twelve point review of system was performed today. Pertinent positives and negatives are mentioned in the HPI. The reminder of the ROS is negative and noncontributory. Objective:    Vitals:    Vitals:    06/29/22 1942 06/29/22 2003 06/29/22 2100 06/29/22 2200   BP:  (!) 161/92 (!) 144/72 (!) 153/80   Pulse: 86 86 84 81   Resp:  26 15 14   Temp:  97.8 °F (36.6 °C)     SpO2:  100% 100% 99%   Weight:         I&O's:  No intake/output data recorded. Visit Vitals  BP (!) 153/80   Pulse 81   Temp 97.8 °F (36.6 °C)   Resp 14   Wt 74.8 kg (165 lb)   SpO2 99%   BMI 35.60 kg/m²       Physical Exam:  General:Alert, No distress,   HEENT: Eyes are PERRL. Conjunctiva without pallor ,erythema. Neck: Supple,no mass palpable  Lungs : Clears to auscultation Bilaterally, Normal respiratory effort  CVS: RRR, S1 S2 normal, No rub, no LE edema  Abdomen: Soft, tender, bowel sounds present  Extremities: No cyanosis, No clubbing  Skin: No rash or lesions.   Neurologic: non focal, AAO x 3  Psych: normal affect    Laboratory Results:    Lab Results   Component Value Date    BUN 21 (H) 06/29/2022     (LL) 06/29/2022    K 4.2 06/29/2022    CL 81 (L) 06/29/2022    CO2 20 (L) 06/29/2022       Lab Results   Component Value Date    BUN 21 (H) 06/29/2022    BUN 20 02/09/2018    BUN 21 (H) 02/09/2018    BUN 18 02/08/2018    BUN 18 02/08/2018    K 4.2 06/29/2022    K 4.0 02/09/2018    K 3.7 02/09/2018    K 3.9 02/08/2018    K 3.9 02/08/2018       Lab Results   Component Value Date    WBC 15.7 (H) 06/29/2022    RBC 4.18 06/29/2022    HGB Unable to obtain result due to hyponatremia 06/29/2022    HCT 32.5 (L) 06/29/2022    MCV 77.8 (L) 06/29/2022    MCH Unable to obtain result due to hyponatremia 06/29/2022    RDW 11.4 (L) 06/29/2022     (H) 06/29/2022       Lab Results   Component Value Date    PHOS 5.1 (H) 02/10/2018       Urine dipstick:   Lab Results Component Value Date/Time    Color PINK 06/29/2022 08:19 PM    Appearance CLEAR 06/29/2022 08:19 PM    Specific gravity 1.013 06/29/2022 08:19 PM    pH (UA) 7.5 06/29/2022 08:19 PM    Protein 100 (A) 06/29/2022 08:19 PM    Glucose Negative 06/29/2022 08:19 PM    Ketone Negative 06/29/2022 08:19 PM    Bilirubin Negative 06/29/2022 08:19 PM    Urobilinogen 0.2 06/29/2022 08:19 PM    Nitrites Negative 06/29/2022 08:19 PM    Leukocyte Esterase Negative 06/29/2022 08:19 PM    Epithelial cells FEW 06/29/2022 08:19 PM    Bacteria Negative 06/29/2022 08:19 PM    WBC 0-4 06/29/2022 08:19 PM    RBC 0-5 06/29/2022 08:19 PM       I have reviewed the following: All pertinent labs, microbiology data, radiology imaging for my assessment     ECG- Rev: Yes / No  Xray/CT/US/MRI REV: Yes/ No    Care Plan discussed with:  Patient, nephrology attending Dr. Wolfgang White, primary RN  Chart reviewed. Medications list Personally Reviewed   [x]      Yes     []               No      Thank you for allowing us to participate in the care of this patient. We will follow patient. Please dont hesitate to call with any questions    Tab Butterfield NP  6/29/2022    [facility]  [address], [address]  Phone - [phone]  Fax - [phone]  [email or web address]. [employer]

## 2022-06-30 NOTE — PROGRESS NOTES
CARE MANAGEMENT INITIAL ASSESSEMENT      NAME:   Joe Vigil   :     1960   MRN:     [id number]       Emergency Contact:  Extended Emergency Contact Information  Primary Emergency Contact: Saad Gilman  Mobile Phone: [phone]  Relation: Daughter  Secondary Emergency Contact: Maria Luisa Saha  Mobile Phone: [phone]  Relation: Son-in-Law    Advance Directive:  Full Code, does not have an advance directive. Healthcare Decision Maker:   Jonnathan Zo- daughter- [phone]    Reason for Admission:  Ms. Francesca Fry is a 58 y.o. female with history that includes DM, HTN, GERD and chronic neck pain  who was emergently admitted for:  hyponatremia    Patient Active Problem List   Diagnosis Code    Hyponatremia E87.1    Headache R51.9    Neck pain M54.2    HTN (hypertension), benign I10    Type 2 diabetes mellitus without complication (HCC) P37.5    Leukocytosis D72.829    Hypotension I95.9    Nausea and vomiting R11.2    Type 2 diabetes mellitus with hyperglycemia (HCC) E11.65    Upper GI bleeding K92.2       Assessment: In person with patient and 2 family members      RUR:  10%  Risk Level:  Low  Value-based purchasing:   No  Bundle patient:  No    Residency:  Private residence  Exterior Steps:  2  Interior Steps:  Pt does not go upstairs    Lives With:  Family    Prior functioning:  Independent. Patient requires assistance with:  N/A    Prior DME required:  Glucometer    DME available:  Same as above    Rehab history:  None    Discharge Concerns/Barriers Identified:  Uninsured or underinsured      Insurer:  Payor: /     [facility]: [facility]   Name of Practice:  [facility]   Current patient: Yes   Approximate date of last visit: 22- next appt with Dr. Gustavo Duval is scheduled for 22. Access to virtual PCP visits:  Unknown    Pharmacy:  [facility]   Financial/Difficulty affording medications:  None identified    COVID-19 vaccination status:  Fully vaccinated.     DC Transport: Family      Transition of care plan:  Home with outpatient follow-up     Comments:   Pt admitted on 6/29/22 for hyponatremia. CM met with Pt and 2 family members to complete initial assessment. Pt lives at home with family. Pt has no hx of HH or home O2. Pt has a working glucometer. Pt is usually independent with ADLs but family has been assisting since she has felt sick. Pt denies any problems with ADLs at baseline. Pt is independent with ambulation. Pt is unemployed. Pt does not drive (vision). Pt confirms she does not have insurance. CM provided financial assistance application for the hospital.     Discharge plan is for Pt to return home. Family will transport Pt home.   _____________________________________  Bishop Leos, [address] [facility]  Available via [facility]  6/30/2022   12:35 PM      Care Management Interventions  PCP Verified by CM: Yes (PCP at [facility])  Mode of Transport at Discharge:  Other (see comment) (family)  Transition of Care Consult (CM Consult): Discharge Planning  MyChart Signup: No  Discharge Durable Medical Equipment: No  Physical Therapy Consult: No  Occupational Therapy Consult: No  Speech Therapy Consult: No  Support Systems: Child(kleber),Other Family Member(s)  Confirm Follow Up Transport: Family  Discharge Location  Patient Expects to be Discharged to[de-identified] Home

## 2022-06-30 NOTE — ED NOTES
Pt stating she is hungry. Pt educated regarding NPO status for possible procedure in the morning. Pt provided with mouth swab.

## 2022-06-30 NOTE — ED NOTES
Pt again asking for food. NPO status explained to pt again. Pt now stating she does not want to have EGD procedure done. MD notified. Awaiting further orders.

## 2022-06-30 NOTE — PROGRESS NOTES
1810: TRANSFER - IN REPORT:    Verbal report received from Maureen(name) on [place]  being received from ED(unit) for change in patient condition(hyponatremia)      Report consisted of patients Situation, Background, Assessment and   Recommendations(SBAR). Information from the following report(s) SBAR, Kardex, Intake/Output, MAR, Recent Results and Cardiac Rhythm SR was reviewed with the receiving nurse. Opportunity for questions and clarification was provided. 1845: patient arrived to unit. Vital signs stable. IV fluids confirmed. Patient belongings at bedside. CHG bath given. 1900: Bedside and Verbal shift change report given to Ld Beach (oncoming nurse) by Jemma (offgoing nurse).  Report included the following information SBAR, Kardex, Intake/Output, MAR, Recent Results and Cardiac Rhythm SR.

## 2022-07-01 ENCOUNTER — APPOINTMENT (OUTPATIENT)
Dept: GENERAL RADIOLOGY | Age: 62
DRG: 048 | End: 2022-07-01
Attending: RADIOLOGY
Payer: MEDICAID

## 2022-07-01 ENCOUNTER — APPOINTMENT (OUTPATIENT)
Dept: INTERVENTIONAL RADIOLOGY/VASCULAR | Age: 62
DRG: 048 | End: 2022-07-01
Attending: INTERNAL MEDICINE
Payer: MEDICAID

## 2022-07-01 LAB
ANION GAP SERPL CALC-SCNC: 10 MMOL/L (ref 5–15)
ANION GAP SERPL CALC-SCNC: 11 MMOL/L (ref 5–15)
ANION GAP SERPL CALC-SCNC: 11 MMOL/L (ref 5–15)
ANION GAP SERPL CALC-SCNC: 13 MMOL/L (ref 5–15)
ANION GAP SERPL CALC-SCNC: 14 MMOL/L (ref 5–15)
ANION GAP SERPL CALC-SCNC: 14 MMOL/L (ref 5–15)
ATRIAL RATE: 86 BPM
BUN SERPL-MCNC: 13 MG/DL (ref 6–20)
BUN SERPL-MCNC: 14 MG/DL (ref 6–20)
BUN SERPL-MCNC: 15 MG/DL (ref 6–20)
BUN SERPL-MCNC: 15 MG/DL (ref 6–20)
BUN SERPL-MCNC: 16 MG/DL (ref 6–20)
BUN SERPL-MCNC: 17 MG/DL (ref 6–20)
BUN/CREAT SERPL: 17 (ref 12–20)
BUN/CREAT SERPL: 18 (ref 12–20)
BUN/CREAT SERPL: 18 (ref 12–20)
BUN/CREAT SERPL: 21 (ref 12–20)
BUN/CREAT SERPL: 23 (ref 12–20)
BUN/CREAT SERPL: 26 (ref 12–20)
CALCIUM SERPL-MCNC: 7.8 MG/DL (ref 8.5–10.1)
CALCIUM SERPL-MCNC: 8 MG/DL (ref 8.5–10.1)
CALCIUM SERPL-MCNC: 8.1 MG/DL (ref 8.5–10.1)
CALCIUM SERPL-MCNC: 8.5 MG/DL (ref 8.5–10.1)
CALCULATED P AXIS, ECG09: 54 DEGREES
CALCULATED R AXIS, ECG10: 31 DEGREES
CALCULATED T AXIS, ECG11: 41 DEGREES
CHLORIDE SERPL-SCNC: 79 MMOL/L (ref 97–108)
CHLORIDE SERPL-SCNC: 80 MMOL/L (ref 97–108)
CHLORIDE SERPL-SCNC: 80 MMOL/L (ref 97–108)
CHLORIDE SERPL-SCNC: 81 MMOL/L (ref 97–108)
CHLORIDE SERPL-SCNC: 83 MMOL/L (ref 97–108)
CHLORIDE SERPL-SCNC: 86 MMOL/L (ref 97–108)
CO2 SERPL-SCNC: 18 MMOL/L (ref 21–32)
CO2 SERPL-SCNC: 18 MMOL/L (ref 21–32)
CO2 SERPL-SCNC: 19 MMOL/L (ref 21–32)
CO2 SERPL-SCNC: 20 MMOL/L (ref 21–32)
CREAT SERPL-MCNC: 0.62 MG/DL (ref 0.55–1.02)
CREAT SERPL-MCNC: 0.71 MG/DL (ref 0.55–1.02)
CREAT SERPL-MCNC: 0.75 MG/DL (ref 0.55–1.02)
CREAT SERPL-MCNC: 0.75 MG/DL (ref 0.55–1.02)
CREAT SERPL-MCNC: 0.76 MG/DL (ref 0.55–1.02)
CREAT SERPL-MCNC: 0.84 MG/DL (ref 0.55–1.02)
DIAGNOSIS, 93000: NORMAL
GLUCOSE BLD STRIP.AUTO-MCNC: 176 MG/DL (ref 65–117)
GLUCOSE BLD STRIP.AUTO-MCNC: 194 MG/DL (ref 65–117)
GLUCOSE BLD STRIP.AUTO-MCNC: 227 MG/DL (ref 65–117)
GLUCOSE BLD STRIP.AUTO-MCNC: 272 MG/DL (ref 65–117)
GLUCOSE BLD STRIP.AUTO-MCNC: 48 MG/DL (ref 65–117)
GLUCOSE SERPL-MCNC: 113 MG/DL (ref 65–100)
GLUCOSE SERPL-MCNC: 155 MG/DL (ref 65–100)
GLUCOSE SERPL-MCNC: 176 MG/DL (ref 65–100)
GLUCOSE SERPL-MCNC: 204 MG/DL (ref 65–100)
GLUCOSE SERPL-MCNC: 214 MG/DL (ref 65–100)
GLUCOSE SERPL-MCNC: 83 MG/DL (ref 65–100)
OSMOLALITY UR: 468 MOSM/KG H2O
P-R INTERVAL, ECG05: 166 MS
POTASSIUM SERPL-SCNC: 3.7 MMOL/L (ref 3.5–5.1)
POTASSIUM SERPL-SCNC: 3.8 MMOL/L (ref 3.5–5.1)
POTASSIUM SERPL-SCNC: 3.8 MMOL/L (ref 3.5–5.1)
POTASSIUM SERPL-SCNC: 3.9 MMOL/L (ref 3.5–5.1)
Q-T INTERVAL, ECG07: 372 MS
QRS DURATION, ECG06: 90 MS
QTC CALCULATION (BEZET), ECG08: 445 MS
SERVICE CMNT-IMP: ABNORMAL
SODIUM SERPL-SCNC: 110 MMOL/L (ref 136–145)
SODIUM SERPL-SCNC: 112 MMOL/L (ref 136–145)
SODIUM SERPL-SCNC: 114 MMOL/L (ref 136–145)
SODIUM SERPL-SCNC: 115 MMOL/L (ref 136–145)
SODIUM UR-SCNC: 125 MMOL/L
VENTRICULAR RATE, ECG03: 86 BPM

## 2022-07-01 PROCEDURE — 02HV33Z INSERTION OF INFUSION DEVICE INTO SUPERIOR VENA CAVA, PERCUTANEOUS APPROACH: ICD-10-PCS | Performed by: INTERNAL MEDICINE

## 2022-07-01 PROCEDURE — 74011250637 HC RX REV CODE- 250/637: Performed by: INTERNAL MEDICINE

## 2022-07-01 PROCEDURE — 36415 COLL VENOUS BLD VENIPUNCTURE: CPT

## 2022-07-01 PROCEDURE — C1892 INTRO/SHEATH,FIXED,PEEL-AWAY: HCPCS

## 2022-07-01 PROCEDURE — 74011250636 HC RX REV CODE- 250/636: Performed by: INTERNAL MEDICINE

## 2022-07-01 PROCEDURE — C1894 INTRO/SHEATH, NON-LASER: HCPCS

## 2022-07-01 PROCEDURE — 36556 INSERT NON-TUNNEL CV CATH: CPT

## 2022-07-01 PROCEDURE — 74011250636 HC RX REV CODE- 250/636: Performed by: NURSE PRACTITIONER

## 2022-07-01 PROCEDURE — C9113 INJ PANTOPRAZOLE SODIUM, VIA: HCPCS | Performed by: INTERNAL MEDICINE

## 2022-07-01 PROCEDURE — 74011636637 HC RX REV CODE- 636/637: Performed by: INTERNAL MEDICINE

## 2022-07-01 PROCEDURE — 71045 X-RAY EXAM CHEST 1 VIEW: CPT

## 2022-07-01 PROCEDURE — 83935 ASSAY OF URINE OSMOLALITY: CPT

## 2022-07-01 PROCEDURE — 74011250636 HC RX REV CODE- 250/636: Performed by: FAMILY MEDICINE

## 2022-07-01 PROCEDURE — 65610000006 HC RM INTENSIVE CARE

## 2022-07-01 PROCEDURE — 80048 BASIC METABOLIC PNL TOTAL CA: CPT

## 2022-07-01 PROCEDURE — 74011250637 HC RX REV CODE- 250/637: Performed by: FAMILY MEDICINE

## 2022-07-01 PROCEDURE — 77030040361 HC SLV COMPR DVT MDII -B

## 2022-07-01 PROCEDURE — 84300 ASSAY OF URINE SODIUM: CPT

## 2022-07-01 PROCEDURE — 74011000250 HC RX REV CODE- 250: Performed by: INTERNAL MEDICINE

## 2022-07-01 PROCEDURE — 82962 GLUCOSE BLOOD TEST: CPT

## 2022-07-01 PROCEDURE — 74011250636 HC RX REV CODE- 250/636: Performed by: HOSPITALIST

## 2022-07-01 PROCEDURE — 93005 ELECTROCARDIOGRAM TRACING: CPT

## 2022-07-01 RX ORDER — SIMETHICONE 80 MG
80 TABLET,CHEWABLE ORAL
Status: DISCONTINUED | OUTPATIENT
Start: 2022-07-01 | End: 2022-07-07 | Stop reason: HOSPADM

## 2022-07-01 RX ORDER — DEXTROSE MONOHYDRATE 100 MG/ML
0-250 INJECTION, SOLUTION INTRAVENOUS AS NEEDED
Status: DISCONTINUED | OUTPATIENT
Start: 2022-07-01 | End: 2022-07-07 | Stop reason: HOSPADM

## 2022-07-01 RX ORDER — METOCLOPRAMIDE HYDROCHLORIDE 5 MG/ML
10 INJECTION INTRAMUSCULAR; INTRAVENOUS ONCE
Status: COMPLETED | OUTPATIENT
Start: 2022-07-01 | End: 2022-07-01

## 2022-07-01 RX ORDER — SODIUM CHLORIDE 0.9 % (FLUSH) 0.9 %
5-40 SYRINGE (ML) INJECTION EVERY 8 HOURS
Status: DISCONTINUED | OUTPATIENT
Start: 2022-07-02 | End: 2022-07-04

## 2022-07-01 RX ORDER — HEPARIN SODIUM 5000 [USP'U]/ML
5000 INJECTION, SOLUTION INTRAVENOUS; SUBCUTANEOUS EVERY 8 HOURS
Status: DISCONTINUED | OUTPATIENT
Start: 2022-07-01 | End: 2022-07-01

## 2022-07-01 RX ORDER — HEPARIN SODIUM 5000 [USP'U]/ML
5000 INJECTION, SOLUTION INTRAVENOUS; SUBCUTANEOUS EVERY 8 HOURS
Status: DISCONTINUED | OUTPATIENT
Start: 2022-07-01 | End: 2022-07-07 | Stop reason: HOSPADM

## 2022-07-01 RX ORDER — 3% SODIUM CHLORIDE 3 G/100ML
25 INJECTION, SOLUTION INTRAVENOUS CONTINUOUS
Status: DISPENSED | OUTPATIENT
Start: 2022-07-01 | End: 2022-07-01

## 2022-07-01 RX ORDER — SIMETHICONE 80 MG
80 TABLET,CHEWABLE ORAL
Status: COMPLETED | OUTPATIENT
Start: 2022-07-01 | End: 2022-07-01

## 2022-07-01 RX ORDER — METOCLOPRAMIDE 10 MG/1
10 TABLET ORAL
Status: DISCONTINUED | OUTPATIENT
Start: 2022-07-01 | End: 2022-07-07 | Stop reason: HOSPADM

## 2022-07-01 RX ORDER — TRAMADOL HYDROCHLORIDE 50 MG/1
50 TABLET ORAL
Status: COMPLETED | OUTPATIENT
Start: 2022-07-01 | End: 2022-07-01

## 2022-07-01 RX ORDER — 3% SODIUM CHLORIDE 3 G/100ML
40 INJECTION, SOLUTION INTRAVENOUS CONTINUOUS
Status: DISPENSED | OUTPATIENT
Start: 2022-07-02 | End: 2022-07-02

## 2022-07-01 RX ORDER — 3% SODIUM CHLORIDE 3 G/100ML
30 INJECTION, SOLUTION INTRAVENOUS CONTINUOUS
Status: DISPENSED | OUTPATIENT
Start: 2022-07-01 | End: 2022-07-01

## 2022-07-01 RX ORDER — MAGNESIUM SULFATE 100 %
4 CRYSTALS MISCELLANEOUS AS NEEDED
Status: DISCONTINUED | OUTPATIENT
Start: 2022-07-01 | End: 2022-07-07 | Stop reason: HOSPADM

## 2022-07-01 RX ADMIN — SODIUM CHLORIDE 30 ML/HR: 3 INJECTION, SOLUTION INTRAVENOUS at 03:25

## 2022-07-01 RX ADMIN — SIMETHICONE 80 MG: 80 TABLET, CHEWABLE ORAL at 16:17

## 2022-07-01 RX ADMIN — ONDANSETRON 4 MG: 2 INJECTION INTRAMUSCULAR; INTRAVENOUS at 20:05

## 2022-07-01 RX ADMIN — SODIUM CHLORIDE 40 MG: 9 INJECTION, SOLUTION INTRAMUSCULAR; INTRAVENOUS; SUBCUTANEOUS at 20:05

## 2022-07-01 RX ADMIN — ACETAMINOPHEN 650 MG: 325 TABLET ORAL at 09:10

## 2022-07-01 RX ADMIN — SIMETHICONE 80 MG: 80 TABLET, CHEWABLE ORAL at 11:02

## 2022-07-01 RX ADMIN — METOCLOPRAMIDE 10 MG: 10 TABLET ORAL at 21:17

## 2022-07-01 RX ADMIN — METOCLOPRAMIDE 10 MG: 5 INJECTION, SOLUTION INTRAMUSCULAR; INTRAVENOUS at 11:02

## 2022-07-01 RX ADMIN — Medication 3 UNITS: at 21:35

## 2022-07-01 RX ADMIN — SODIUM CHLORIDE 40 ML/HR: 3 INJECTION, SOLUTION INTRAVENOUS at 23:32

## 2022-07-01 RX ADMIN — DEXTROSE 250 ML: 10 SOLUTION INTRAVENOUS at 08:57

## 2022-07-01 RX ADMIN — SODIUM CHLORIDE 25 ML/HR: 3 INJECTION, SOLUTION INTRAVENOUS at 10:25

## 2022-07-01 RX ADMIN — ATORVASTATIN CALCIUM 40 MG: 20 TABLET, FILM COATED ORAL at 09:00

## 2022-07-01 RX ADMIN — METOCLOPRAMIDE 10 MG: 10 TABLET ORAL at 16:17

## 2022-07-01 RX ADMIN — ONDANSETRON 4 MG: 4 TABLET, ORALLY DISINTEGRATING ORAL at 08:55

## 2022-07-01 RX ADMIN — HEPARIN SODIUM 5000 UNITS: 5000 INJECTION INTRAVENOUS; SUBCUTANEOUS at 16:17

## 2022-07-01 RX ADMIN — Medication 10 ML: at 07:00

## 2022-07-01 RX ADMIN — TRAMADOL HYDROCHLORIDE 50 MG: 50 TABLET ORAL at 12:53

## 2022-07-01 RX ADMIN — AMLODIPINE BESYLATE: 5 TABLET ORAL at 08:59

## 2022-07-01 RX ADMIN — Medication 10 ML: at 13:20

## 2022-07-01 RX ADMIN — Medication 16 G: at 08:51

## 2022-07-01 RX ADMIN — SIMETHICONE 80 MG: 80 TABLET, CHEWABLE ORAL at 20:05

## 2022-07-01 RX ADMIN — ACETAMINOPHEN 650 MG: 325 TABLET ORAL at 16:17

## 2022-07-01 RX ADMIN — SODIUM CHLORIDE 40 MG: 9 INJECTION, SOLUTION INTRAMUSCULAR; INTRAVENOUS; SUBCUTANEOUS at 09:00

## 2022-07-01 RX ADMIN — Medication 20 ML: at 22:00

## 2022-07-01 RX ADMIN — Medication 2 UNITS: at 16:17

## 2022-07-01 RX ADMIN — HEPARIN SODIUM 5000 UNITS: 5000 INJECTION INTRAVENOUS; SUBCUTANEOUS at 09:15

## 2022-07-01 NOTE — PROGRESS NOTES
June Folks  YOB: 1960      Assessment & Plan:     Hypo-osmolar hyponatremia d/t SIADH in the setting of N/V and abdominal pain, h/o thiazide diuretics  HTN  DM-2    -NA improved from 114>120 in 4 hr and rapidly lower down with D5W and DDVAP 2 mcg one dose.  -current  and K 3.7 s/p 3 % saline  -I will give 3% saline 25 cc/hr for 8 hr.   -check BMP q4 hr  -Check urine NA and osmolality today  -if NA over corrected then try to give D5W first then DDVAP  -Controlled pain, N/V which inappropriately stimulate ADH  -plan d/w patient and pharmacist           Subjective:   CC: f/u Hyponatremia  HPI: Patient seen and c/o headache and abdominal pain,   ROS:  Current Facility-Administered Medications   Medication Dose Route Frequency    3% sodium chloride (*HIGH ALERT*CONCENTRATED IV*) infusion  25 mL/hr IntraVENous CONTINUOUS    glucose chewable tablet 16 g  4 Tablet Oral PRN    glucagon (GLUCAGEN) injection 1 mg  1 mg IntraMUSCular PRN    dextrose 10% infusion 0-250 mL  0-250 mL IntraVENous PRN    simethicone (MYLICON) tablet 80 mg  80 mg Oral QID PRN    simethicone (MYLICON) tablet 80 mg  80 mg Oral NOW    metoclopramide HCl (REGLAN) injection 10 mg  10 mg IntraVENous ONCE    heparin (porcine) injection 5,000 Units  5,000 Units SubCUTAneous Q8H    sodium chloride (NS) flush 5-40 mL  5-40 mL IntraVENous Q8H    sodium chloride (NS) flush 5-40 mL  5-40 mL IntraVENous PRN    acetaminophen (TYLENOL) tablet 650 mg  650 mg Oral Q6H PRN    Or    acetaminophen (TYLENOL) suppository 650 mg  650 mg Rectal Q6H PRN    polyethylene glycol (MIRALAX) packet 17 g  17 g Oral DAILY PRN    ondansetron (ZOFRAN ODT) tablet 4 mg  4 mg Oral Q8H PRN    Or    ondansetron (ZOFRAN) injection 4 mg  4 mg IntraVENous Q6H PRN    pantoprazole (PROTONIX) 40 mg in 0.9% sodium chloride 10 mL injection  40 mg IntraVENous Q12H    ALPRAZolam (XANAX) tablet 0.25 mg  0.25 mg Oral QHS PRN    atorvastatin (LIPITOR) tablet 40 mg  40 mg Oral DAILY    [Held by provider] insulin glargine (LANTUS) injection 40 Units  40 Units SubCUTAneous QHS    insulin lispro (HUMALOG) injection   SubCUTAneous AC&HS    amLODIPine/valsartan (EXFORGE) 10/320 mg   Oral DAILY          Objective:     Vitals:  Blood pressure 123/65, pulse 64, temperature 97.7 °F (36.5 °C), resp. rate 10, weight 74.8 kg (165 lb), SpO2 100 %, not currently breastfeeding. Temp (24hrs), Av.2 °F (36.8 °C), Min:97.6 °F (36.4 °C), Max:99 °F (37.2 °C)      Intake and Output:  No intake/output data recorded.  1901 -  0700  In: 250 [P.O.:50; I.V.:200]  Out: 6085 [Urine:6085]    Physical Exam:                   Physical Examination:   GENERAL ASSESSMENT: NAD  HEENT:Nontraumatic   CHEST: CTA  HEART: S1S2  ABDOMEN: Soft,NT,  :[place]:   EXTREMITY: no EDEMA  NEURO:Grossly non focal          ECG/rhythm:    Data Review      No results for input(s): TNIPOC in the last 72 hours. No lab exists for component: ITNL   No results for input(s): CPK, CKMB, TROIQ in the last 72 hours. Recent Labs     22  0617 22  0100 22  2033 22  0600 22  0200 22  2237 22  1750   * 114* 113*   < > 117*   < > 114*   K 3.7 3.7 4.1   < > 3.8   < > 4.2   CL 86* 83* 84*   < > 86*   < > 81*   CO2 19* 20* 18*   < > 20*   < > 20*   BUN 16 15 16   < > 19   < > 21*   CREA 0.62 0.71 0.69   < > 0.84   < > 0.88   GLU 83 113* 142*   < > 180*   < > 191*   MG  --   --   --   --  1.7  --   --    CA 8.0* 8.0* 8.3*   < > 8.1*   < > 8.9   ALB  --   --   --   --   --   --  3.2*   WBC  --   --   --   --  13.1*  --  15.7*   HGB  --   --   --   --  10.7*  --  Unable to obtain result due to hyponatremia   HCT  --   --   --   --  28.5*  --  32.5*   PLT  --   --   --   --  346  --  410*    < > = values in this interval not displayed.       Recent Labs     22  1826   INR 1.0   PTP 10.9   APTT 21.7*     Needs: urine analysis, urine sodium, protein and creatinine  Lab Results   Component Value Date/Time    Sodium,urine random 55 06/30/2022 10:51 AM    Creatinine, urine <13.00 06/29/2022 10:37 PM               Discussed with:  Staff    : Juve Sifuentes MD  7/1/2022        [facility]:  [email or web address]  Olga Deanne office:  [address], [address] [place] [address],[address]  [place], [address] [place] [address]  Phone: [phone]  Fax :     [phone]    [facility] office:  [address] [facility], [facility]  Phone - [phone]  Fax - [phone]

## 2022-07-01 NOTE — CONSULTS
[employer] [employer]  CRITICAL CARE    Tele ICU TEAM Progress Note    Name: Radha Finley   : 1960   MRN: [id number]   Date: 2022           ICU Assessment     Abdominal pain  Hyponatremia  Hypoglycemia  Hypertension   DM2         ICU Comprehensive Plan of Care:   Neuro  No acute issues, continue to closely monitor    Respiratory  Optimal oxygen saturations on 2 L    CV  Ongoing hypertonic saline  Hemodynamically stable, continue amlodipine/valsartan  Holding chlorthalidone    GI  P.o. diet  H&H has been stable and patient reported that the comment of coffee-ground emesis was incorrect  PPI -changed to daily    Renal / Endo  Nephrology on board  3% saline  Continue to monitor Accu-Cheks every 4 hours  Avoid nephrotoxins  Accu-Cheks/sliding scale insulin to maintain euglycemia, blood sugars 140-180  Hypoglycemia protocol    Heme/Onc  Tx Hgb < 7, Plt<10k; transfuse as needed    ID   No acute indications    DVT prophylaxis- SCDs, start heparin SC as no concerns for GI bleed      Code Status: Full Code  LOS: 2       Discussed Care Plan with Bedside RN       Subjective:   Progress Note: 2022      Reason for ICU Admission: Hyponatremia     HPI:  58 y.o.  female with past medical history of type 2 diabetes mellitus, Hypertension, GERD and chronic neck pain  presented to the ED on  with abdominal pain, nausea and vomIting. She stated that the abdominal pain started 3 days ago, epigastric, burning, nonradiating, moderate and associated with nausea and vomiting . Lab evaluation showed a sodium level of 114, stable H&H . Patient was started on 3% saline and admitted to ICU for further management. .      Concerns with hypoglycemic event today when I was evaluating the patient-patient complained of feeling of uneasiness and diaphoresis, Accu-Chek showed a blood sugar of 48.     Active Problem List:     Problem List  Date Reviewed: 2018          Codes Class    Nausea and vomiting ICD-10-CM: R11.2  ICD-9-CM: 787.01 Type 2 diabetes mellitus with hyperglycemia (HCC) ICD-10-CM: E11.65  ICD-9-CM: 250.00         Upper GI bleeding ICD-10-CM: K92.2  ICD-9-CM: 578.9         Hypotension ICD-10-CM: I95.9  ICD-9-CM: 458.9         Hyponatremia ICD-10-CM: E87.1  ICD-9-CM: 276.1         Headache ICD-10-CM: R51.9  ICD-9-CM: 784.0         Neck pain ICD-10-CM: M54.2  ICD-9-CM: 723.1         HTN (hypertension), benign ICD-10-CM: I10  ICD-9-CM: 401.1         Type 2 diabetes mellitus without complication (HCC) [id number]: E11.9  ICD-9-CM: 250.00         Leukocytosis ICD-10-CM: D72.829  ICD-9-CM: 288.60               Past Medical History:      has a past medical history of Diabetes ([address] GERD (gastroesophageal reflux disease), and Hypertension. Past Surgical History:      has a past surgical history that includes hx breast lumpectomy (Left). Home Medications:     Prior to Admission medications    Medication Sig Start Date End Date Taking? Authorizing Provider   atorvastatin (LIPITOR) 40 mg tablet Take 40 mg by mouth daily. Yes Provider, Historical   omeprazole (PRILOSEC) 20 mg capsule Take 20 mg by mouth daily. Yes Provider, Historical   insulin glargine (Lantus Solostar U-100 Insulin) 100 unit/mL (3 mL) inpn 40 Units by SubCUTAneous route nightly. Yes Provider, Historical   hydrOXYzine HCL (ATARAX) 25 mg tablet Take 25 mg by mouth nightly as needed for Anxiety. Yes Provider, Historical   SITagliptin (Januvia) 100 mg tablet Take 100 mg by mouth daily. Yes Provider, Historical   ALPRAZolam (XANAX) 0.25 mg tablet Take 0.25 mg by mouth nightly as needed for Anxiety. Yes Provider, Historical   amLODIPine-Olmesartan 5-20 mg tab Take 5-20 mg by mouth. Yes Provider, Historical   gabapentin (NEURONTIN) 100 mg capsule Take 100 mg by mouth nightly as needed for Pain. Yes Provider, Historical   chlorthalidone (HYGROTON) 25 mg tablet Take 12.5 mg by mouth daily.    Yes Provider, Historical   acetaminophen (TYLENOL) 500 mg tablet Take 2 Tablets by mouth every six (6) hours as needed for Pain or Fever. 22   Freda Andres MD       Allergies/Social/Family History:     No Known Allergies   Social History     Tobacco Use    Smoking status: Never Smoker    Smokeless tobacco: Never Used   Substance Use Topics    Alcohol use: No      Family History   Problem Relation Age of Onset    Diabetes Brother        Review of Systems:     Complete 14 point review of systems obtained and are negative unless otherwise stated in HPI    Objective:   Vital Signs:  Visit Vitals  /65   Pulse 64   Temp 97.7 °F (36.5 °C)   Resp 10   Wt 74.8 kg (165 lb)   SpO2 100%   Breastfeeding No   BMI 35.60 kg/m²      O2 Device: None (Room air) Temp (24hrs), Av.2 °F (36.8 °C), Min:97.6 °F (36.4 °C), Max:99 °F (37.2 °C)           Intake/Output:     Intake/Output Summary (Last 24 hours) at 2022 0913  Last data filed at 2022 0600  Gross per 24 hour   Intake 250 ml   Output 2835 ml   Net -2585 ml       Physical Exam:   Assisted by nurse/resident during video interview,  General  awake/alert/mild distress during the hypoglycemic event  HEENT-normocephalic, atraumatic  CV  NSR on monitor  Resp Symmetric chest rise   GI  Nontender, nondistended  Neuro moving all extremities, no focal deficit  Skin no obvious rash    LABS AND  DATA: Personally reviewed  Recent Labs     22  0200 22  1750   WBC 13.1* 15.7*   HGB 10.7* Unable to obtain result due to hyponatremia   HCT 28.5* 32.5*    410*     Recent Labs     22  0617 22  0100 22  0600 22  0200   * 114*   < > 117*   K 3.7 3.7   < > 3.8   CL 86* 83*   < > 86*   CO2 19* 20*   < > 20*   BUN 16 15   < > 19   CREA 0.62 0.71   < > 0.84   GLU 83 113*   < > 180*   CA 8.0* 8.0*   < > 8.1*   MG  --   --   --  1.7    < > = values in this interval not displayed.      Recent Labs     22  1750   AP 54   TP 6.8   ALB 3.2*   GLOB 3.6     Recent Labs     22  1826   INR 1. 0   PTP 10.9   APTT 21.7*      No results for input(s): PHI, PCO2I, PO2I, FIO2I in the last 72 hours. No results for input(s): CPK, CKMB, TROIQ, BNPP in the last 72 hours. Hemodynamics:   PAP:   CO:     Wedge:   CI:     CVP:    SVR:       PVR:       Ventilator Settings:  Mode Rate Tidal Volume Pressure FiO2 PEEP                    Peak airway pressure:      Minute ventilation:          MEDS: Reviewed       I performed all aspects of the physical examination via Telemedicine associated with two way audio and video communication and with the on-site assistance of Nurse. I am located in [place] and the patient is located in [place] [address]. Patient is critically ill in the ICU. I  personally  reviewed the pertinent medical records, laboratory/ pathology data and radiographic images. The decision making regarding this patient is as documented above, which was generated  following  discussion  with the multidisciplinary team and creation of a treatment plan for  the patient. We discussed the patient's interval history and future coordination of care and  plans. The patient's medications  were reviewed and changes made as stipulated above. Due to  critical illness impairing one or more vital organs of this patient resulting in life threatening clinical situation  I have provided direct, frequent personal  assessment and manipulation in management plan and spent 35  minutes  of  critical care time excluding the time spent on procedures and teaching. Greater than 50% of this time  in patient's care was  employed  in counseling and coordination of care and engaged in face to face discussion of case management issues, addressing questions, and outlining a plan of  therapy.     Malgorzata Gutierrez, [address]  7/1/2022

## 2022-07-01 NOTE — PROGRESS NOTES
Transition of care note:    RUR 10%(low readmission risk score)    Reason for Admission:  Ms. Turner [place] is a 58 y.o. female with history that includes DM, HTN, GERD and chronic neck pain  who was emergently admitted for:  hyponatremia    Emergency Contact:  Extended Emergency Contact Information  Primary Emergency Contact: Calixto Reinoso  Mobile Phone: [phone]  Relation: Daughter  Secondary Emergency Contact: Aj Tidwell  Mobile Phone: [phone]  Relation: Son-in-Law     PCP:    [facility]              Name of Practice:  [facility]              Current patient: Yes              Approximate date of last visit: 5/20/22- next appt with Dr. James Richter is scheduled for 7/1/22.,family to cancel    Pharmacy:  [facility]   Financial/Difficulty affording medications:  None identified     Pt confirms she does not have insurance. CM provided financial assistance application for the hospital in the ED. Prior to hospitalization:  Usually Independent with ambulation  Requiring assistance from family since becoming ill  Lives with family  Has glucometer  No prior history of home health or rehab    Today:  Na 114,115  3%  Nacl infusion   Eventual plan is to return home with family.   Bahamian speaking predominately    [facility]

## 2022-07-01 NOTE — PROGRESS NOTES
Problem: Falls - Risk of  Goal: *Absence of Falls  Description: Document Bob Retana Fall Risk and appropriate interventions in the flowsheet.   Outcome: Progressing Towards Goal  Note: Fall Risk Interventions:  Mobility Interventions: Patient to call before getting OOB         Medication Interventions: Patient to call before getting OOB    Elimination Interventions: Call light in reach              Problem: Patient Education: Go to Patient Education Activity  Goal: Patient/Family Education  Outcome: Progressing Towards Goal

## 2022-07-01 NOTE — PROGRESS NOTES
Spiritual Care Assessment/Progress Note  [address]      NAME: Martín Somers      MRN: [id number]  AGE: 58 y.o. SEX: female  Synagogue Affiliation: [employer] Thurston   Language: English     7/1/2022           Spiritual Assessment begun in [facility] through conversation with:         []Patient        [] Family    [] Friend(s)        Reason for Consult: Request by patient     Spiritual beliefs: (Please include comment if needed)     [] Identifies with a zachary tradition:         [] Supported by a zachary community:            [] Claims no spiritual orientation:           [] Seeking spiritual identity:                [] Adheres to an individual form of spirituality:           [x] Not able to assess:                           Identified resources for coping:      [] Prayer                               [] Music                  [] Guided Imagery     [] Family/friends                 [] Pet visits     [] Devotional reading                         [] Unknown     [] Other:                                               Interventions offered during this visit: (See comments for more details)    Patient Interventions: Affirmation of emotions/emotional suffering,Prayer (assurance of)           Plan of Care:     [] Support spiritual and/or cultural needs    [] Support AMD and/or advance care planning process      [] Support grieving process   [] Coordinate Rites and/or Rituals    [] Coordination with community clergy   [] No spiritual needs identified at this time   [] Detailed Plan of Care below (See Comments)  [] Make referral to Music Therapy  [] Make referral to Pet Therapy     [] Make referral to Addiction services  [] Make referral to [facility]  [] Make referral to Spiritual Care Partner  [] No future visits requested        [] Contact Spiritual Care for further referrals     Comments: Visited Ms Aden Rodriguez in room [phone] regarding in-basket request for  visit; reviewed patient's chart prior to visit. Ms Richard Posada was restless and fanning herself; patient's nurse was at the bedside providing care. Acknowledged patient's situation and assured her of prayers on her behalf. : Rev. Rubén Ambriz.  Leidy Dai; [facility], to contact [phone] call: [phone]

## 2022-07-01 NOTE — PROGRESS NOTES
Verbal shift change report given to Will (oncoming nurse) by Roman Reinoso (offgoing nurse). Report included the following information SBAR, Kardex, MAR, Recent Results and Cardiac Rhythm Sinus. 0730 - Na 115, MD notified via PerfectServe    0830 - Assessment complete, rounded with Intensivist, hypoglycemic w/chest pain, orders received    1000 - BMP & urine collected & sent to lab    1100 - Fingerstick glucose 194, result relayed to MD per her request    1200 - Assessment complete, family @ bedside    1400 - IR to place line    1600 - Assessment complete, pt asst to chair X 2, family @ bedside    1800 - family @ bedside, opportunity for questions & clarification provided, MD updated, labs drawn, 3% infusion completed    1900 - MDs alerted to Sodium levels via PerfectServe. Verbal shift change report given to [address] (oncoming nurse) by Will (offgoing nurse). Report included the following information SBAR, Kardex, Intake/Output, MAR, Recent Results and Cardiac Rhythm Sinus.

## 2022-07-01 NOTE — PROGRESS NOTES
Overnight Nephrology Cross Cover  6p-6a    Reviewed patient repeat sodium results  NA now down to 113  ICC MD ordered 3% saline over 4-hours at 30 ml/hr for a total volume of 120 ml  Will follow up on repeat labs   Please call on call nephrology team with repeat results     [phone]: Reviewed patient repeat sodium results   Drawn with 2-hours remaining of her 3% hypertonic saline infusion  Repeat sodium 114<113<114<116  Will follow results after infusion completes  Please call on call nephrology team with repeat results      Monica Mackay, [address] [facility] [facility] [place]

## 2022-07-01 NOTE — PROGRESS NOTES
Sodium is still not correcting despite 3%NS. Urine osmolality is actually low. Recieved ddvp. Will run 3% for 4 hours at 45cc/hr. Repeat level in 3 hours.

## 2022-07-01 NOTE — PROGRESS NOTES
Sohail Gimenezelsen [facility] 79  5[address] [facility], [place], [address] [place] [address]  [phone] 700 [address] Adult  Hospitalist Group                                                                                          Hospitalist Progress Note  Devorah Peters MD        Date of Service:  2022  NAME:  Michelle Singh  :  1960  MRN:  [id number]      Admission Summary:   58 to female admitted with hyponatremia    Interval history / Subjective:    feels well, NAD     Assessment & Plan:     Hyponatremia (2018)  -likely due to chronic hctz use and SIADH  -s/p 3% ns and DDAVP but still not improving  -nephrology following, repeat 3% NS     Upper GI bleeding (2022)  -patient reports this is not correct,  misinterpreted what she was saying.  -cont regular diet, cancel GI consult     Nausea and vomiting (2022)  -likely from hyponatremia, gastroparesis  -now tolerating diet  -Protonix IV, scheduled reglan PO  -prn simethicone     Type 2 diabetes mellitus with hyperglycemia ([facility] [address] (2022)  -check a1c  -Monitor blood sugar  -Correctional insulin  -Resume her Lantus 40 units daily at home    HTN  -cont exforge  -dc hctz    Code status: full  DVT prophylaxis: [facility]       Hospital Problems  Date Reviewed: 2018          Codes Class Noted POA    Nausea and vomiting ICD-10-CM: R11.2  ICD-9-CM: 787.01  2022 Yes        Type 2 diabetes mellitus with hyperglycemia ([facility] [address] ICD-10-CM: E11.65  ICD-9-CM: 250.00  2022 Yes        Upper GI bleeding ICD-10-CM: K92.2  ICD-9-CM: 578.9  2022 Yes        Hyponatremia ICD-10-CM: E87.1  ICD-9-CM: 276.1  2018 Unknown                Review of Systems:   A comprehensive review of systems was negative except for that written in the HPI. Vital Signs:    Last 24hrs VS reviewed since prior progress note.  Most recent are:  Visit Vitals  BP (!) 120/93   Pulse 87   Temp 97.7 °F (36.5 °C)   Resp 14   Wt 74.8 kg (165 lb)   SpO2 99%   Breastfeeding No   BMI 35.60 kg/m²         Intake/Output Summary (Last 24 hours) at 7/1/2022 1320  Last data filed at 7/1/2022 1200  Gross per 24 hour   Intake 899.58 ml   Output 3435 ml   Net -2535.42 ml        Physical Examination:             Constitutional:  No acute distress, cooperative, pleasant    ENT:  Oral mucosa moist, oropharynx benign. Resp:  CTA bilaterally. No wheezing/rhonchi/rales. No accessory muscle use   CV:  Regular rhythm, normal rate, no murmurs, gallops, rubs    GI:  Soft, non distended, non tender. normoactive bowel sounds, no hepatosplenomegaly     Musculoskeletal:  No edema, warm, 2+ pulses throughout    Neurologic:  Moves all extremities. AAOx3, CN II-XII reviewed     Psych:  Good insight, Not anxious nor agitated. Data Review:    Review and/or order of clinical lab test      Labs:     Recent Labs     06/30/22  0200 06/29/22  1750   WBC 13.1* 15.7*   HGB 10.7* Unable to obtain result due to hyponatremia   HCT 28.5* 32.5*    410*     Recent Labs     07/01/22  0904 07/01/22  0617 07/01/22  0100 06/30/22  0600 06/30/22  0200   * 115* 114*   < > 117*   K 3.9 3.7 3.7   < > 3.8   CL 80* 86* 83*   < > 86*   CO2 18* 19* 20*   < > 20*   BUN 15 16 15   < > 19   CREA 0.84 0.62 0.71   < > 0.84   * 83 113*   < > 180*   CA 8.5 8.0* 8.0*   < > 8.1*   MG  --   --   --   --  1.7    < > = values in this interval not displayed. Recent Labs     06/29/22  1750   ALT 28   AP 54   TBILI 0.6   TP 6.8   ALB 3.2*   GLOB 3.6     Recent Labs     06/29/22  1826   INR 1.0   PTP 10.9   APTT 21.7*      No results for input(s): FE, TIBC, PSAT, FERR in the last 72 hours. No results found for: FOL, RBCF   No results for input(s): PH, PCO2, PO2 in the last 72 hours. No results for input(s): CPK, CKNDX, TROIQ in the last 72 hours.     No lab exists for component: CPKMB  Lab Results   Component Value Date/Time    Cholesterol, total 141 02/09/2018 05:14 AM    HDL Cholesterol 36 02/09/2018 05:14 AM    LDL, calculated 75.8 02/09/2018 05:14 AM    Triglyceride 146 02/09/2018 05:14 AM    CHOL/HDL Ratio 3.9 02/09/2018 05:14 AM     Lab Results   Component Value Date/Time    Glucose (POC) 194 (H) 07/01/2022 11:06 AM    Glucose (POC) 272 (H) 07/01/2022 09:14 AM    Glucose (POC) 48 (LL) 07/01/2022 08:46 AM    Glucose (POC) 142 (H) 06/30/2022 10:50 PM    Glucose (POC) 164 (H) 06/30/2022 05:07 PM     Lab Results   Component Value Date/Time    Color PINK 06/29/2022 08:19 PM    Appearance CLEAR 06/29/2022 08:19 PM    Specific gravity 1.013 06/29/2022 08:19 PM    pH (UA) 7.5 06/29/2022 08:19 PM    Protein 100 (A) 06/29/2022 08:19 PM    Glucose Negative 06/29/2022 08:19 PM    Ketone Negative 06/29/2022 08:19 PM    Bilirubin Negative 06/29/2022 08:19 PM    Urobilinogen 0.2 06/29/2022 08:19 PM    Nitrites Negative 06/29/2022 08:19 PM    Leukocyte Esterase Negative 06/29/2022 08:19 PM    Epithelial cells FEW 06/29/2022 08:19 PM    Bacteria Negative 06/29/2022 08:19 PM    WBC 0-4 06/29/2022 08:19 PM    RBC 0-5 06/29/2022 08:19 PM         Medications Reviewed:     Current Facility-Administered Medications   Medication Dose Route Frequency    3% sodium chloride (*HIGH ALERT*CONCENTRATED IV*) infusion  25 mL/hr IntraVENous CONTINUOUS    glucose chewable tablet 16 g  4 Tablet Oral PRN    glucagon (GLUCAGEN) injection 1 mg  1 mg IntraMUSCular PRN    dextrose 10% infusion 0-250 mL  0-250 mL IntraVENous PRN    simethicone (MYLICON) tablet 80 mg  80 mg Oral QID PRN    heparin (porcine) injection 5,000 Units  5,000 Units SubCUTAneous Q8H    sodium chloride (NS) flush 5-40 mL  5-40 mL IntraVENous Q8H    sodium chloride (NS) flush 5-40 mL  5-40 mL IntraVENous PRN    acetaminophen (TYLENOL) tablet 650 mg  650 mg Oral Q6H PRN    Or    acetaminophen (TYLENOL) suppository 650 mg  650 mg Rectal Q6H PRN    polyethylene glycol (MIRALAX) packet 17 g  17 g Oral DAILY PRN    ondansetron (ZOFRAN ODT) tablet 4 mg  4 mg Oral Q8H PRN    Or    ondansetron (ZOFRAN) injection 4 mg  4 mg IntraVENous Q6H PRN    pantoprazole (PROTONIX) 40 mg in 0.9% sodium chloride 10 mL injection  40 mg IntraVENous Q12H    ALPRAZolam (XANAX) tablet 0.25 mg  0.25 mg Oral QHS PRN    atorvastatin (LIPITOR) tablet 40 mg  40 mg Oral DAILY    [Held by provider] insulin glargine (LANTUS) injection 40 Units  40 Units SubCUTAneous QHS    insulin lispro (HUMALOG) injection   SubCUTAneous AC&HS    amLODIPine/valsartan (EXFORGE) 10/320 mg   Oral DAILY     ______________________________________________________________________  EXPECTED LENGTH OF STAY: - - -  ACTUAL LENGTH OF STAY:          2                 Wendy Paige MD

## 2022-07-02 ENCOUNTER — APPOINTMENT (OUTPATIENT)
Dept: MRI IMAGING | Age: 62
DRG: 048 | End: 2022-07-02
Attending: FAMILY MEDICINE
Payer: MEDICAID

## 2022-07-02 LAB
ANION GAP SERPL CALC-SCNC: 10 MMOL/L (ref 5–15)
ANION GAP SERPL CALC-SCNC: 11 MMOL/L (ref 5–15)
ANION GAP SERPL CALC-SCNC: 9 MMOL/L (ref 5–15)
BASOPHILS # BLD: 0 K/UL (ref 0–0.1)
BASOPHILS NFR BLD: 0 % (ref 0–1)
BUN SERPL-MCNC: 16 MG/DL (ref 6–20)
BUN/CREAT SERPL: 18 (ref 12–20)
BUN/CREAT SERPL: 19 (ref 12–20)
BUN/CREAT SERPL: 20 (ref 12–20)
CALCIUM SERPL-MCNC: 7.6 MG/DL (ref 8.5–10.1)
CALCIUM SERPL-MCNC: 8 MG/DL (ref 8.5–10.1)
CALCIUM SERPL-MCNC: 8.2 MG/DL (ref 8.5–10.1)
CHLORIDE SERPL-SCNC: 83 MMOL/L (ref 97–108)
CHLORIDE SERPL-SCNC: 91 MMOL/L (ref 97–108)
CHLORIDE SERPL-SCNC: 91 MMOL/L (ref 97–108)
CO2 SERPL-SCNC: 18 MMOL/L (ref 21–32)
CO2 SERPL-SCNC: 19 MMOL/L (ref 21–32)
CO2 SERPL-SCNC: 22 MMOL/L (ref 21–32)
COMMENT, HOLDF: NORMAL
CREAT SERPL-MCNC: 0.82 MG/DL (ref 0.55–1.02)
CREAT SERPL-MCNC: 0.86 MG/DL (ref 0.55–1.02)
CREAT SERPL-MCNC: 0.9 MG/DL (ref 0.55–1.02)
DIFFERENTIAL METHOD BLD: ABNORMAL
EOSINOPHIL # BLD: 0.1 K/UL (ref 0–0.4)
EOSINOPHIL NFR BLD: 1 % (ref 0–7)
ERYTHROCYTE [DISTWIDTH] IN BLOOD BY AUTOMATED COUNT: 11.2 % (ref 11.5–14.5)
EST. AVERAGE GLUCOSE BLD GHB EST-MCNC: 180 MG/DL
GLUCOSE BLD STRIP.AUTO-MCNC: 174 MG/DL (ref 65–117)
GLUCOSE BLD STRIP.AUTO-MCNC: 179 MG/DL (ref 65–117)
GLUCOSE BLD STRIP.AUTO-MCNC: 266 MG/DL (ref 65–117)
GLUCOSE BLD STRIP.AUTO-MCNC: 271 MG/DL (ref 65–117)
GLUCOSE BLD STRIP.AUTO-MCNC: 294 MG/DL (ref 65–117)
GLUCOSE SERPL-MCNC: 170 MG/DL (ref 65–100)
GLUCOSE SERPL-MCNC: 183 MG/DL (ref 65–100)
GLUCOSE SERPL-MCNC: 289 MG/DL (ref 65–100)
HBA1C MFR BLD: 7.9 % (ref 4–5.6)
HCT VFR BLD AUTO: 26.1 % (ref 35–47)
HGB BLD-MCNC: ABNORMAL G/DL (ref 11.5–16)
IMM GRANULOCYTES # BLD AUTO: 0.1 K/UL (ref 0–0.04)
IMM GRANULOCYTES NFR BLD AUTO: 1 % (ref 0–0.5)
LYMPHOCYTES # BLD: 4.2 K/UL (ref 0.8–3.5)
LYMPHOCYTES NFR BLD: 32 % (ref 12–49)
MCH RBC QN AUTO: ABNORMAL PG (ref 26–34)
MCHC RBC AUTO-ENTMCNC: ABNORMAL G/DL (ref 30–36.5)
MCV RBC AUTO: 77 FL (ref 80–99)
MONOCYTES # BLD: 1.1 K/UL (ref 0–1)
MONOCYTES NFR BLD: 8 % (ref 5–13)
NEUTS SEG # BLD: 7.7 K/UL (ref 1.8–8)
NEUTS SEG NFR BLD: 58 % (ref 32–75)
NRBC # BLD: 0 K/UL (ref 0–0.01)
NRBC BLD-RTO: 0 PER 100 WBC
PLATELET # BLD AUTO: 334 K/UL (ref 150–400)
PMV BLD AUTO: 8.4 FL (ref 8.9–12.9)
POTASSIUM SERPL-SCNC: 3.6 MMOL/L (ref 3.5–5.1)
POTASSIUM SERPL-SCNC: 3.9 MMOL/L (ref 3.5–5.1)
POTASSIUM SERPL-SCNC: 4 MMOL/L (ref 3.5–5.1)
RBC # BLD AUTO: 3.39 M/UL (ref 3.8–5.2)
SAMPLES BEING HELD,HOLD: NORMAL
SERVICE CMNT-IMP: ABNORMAL
SODIUM SERPL-SCNC: 112 MMOL/L (ref 136–145)
SODIUM SERPL-SCNC: 119 MMOL/L (ref 136–145)
SODIUM SERPL-SCNC: 120 MMOL/L (ref 136–145)
SODIUM SERPL-SCNC: 122 MMOL/L (ref 136–145)
WBC # BLD AUTO: 13.1 K/UL (ref 3.6–11)

## 2022-07-02 PROCEDURE — 74011250637 HC RX REV CODE- 250/637: Performed by: INTERNAL MEDICINE

## 2022-07-02 PROCEDURE — 80048 BASIC METABOLIC PNL TOTAL CA: CPT

## 2022-07-02 PROCEDURE — 74011250636 HC RX REV CODE- 250/636: Performed by: NURSE PRACTITIONER

## 2022-07-02 PROCEDURE — 74011000250 HC RX REV CODE- 250: Performed by: INTERNAL MEDICINE

## 2022-07-02 PROCEDURE — 65610000006 HC RM INTENSIVE CARE

## 2022-07-02 PROCEDURE — 82962 GLUCOSE BLOOD TEST: CPT

## 2022-07-02 PROCEDURE — 2709999900 HC NON-CHARGEABLE SUPPLY

## 2022-07-02 PROCEDURE — C9113 INJ PANTOPRAZOLE SODIUM, VIA: HCPCS | Performed by: INTERNAL MEDICINE

## 2022-07-02 PROCEDURE — 84295 ASSAY OF SERUM SODIUM: CPT

## 2022-07-02 PROCEDURE — 86677 HELICOBACTER PYLORI ANTIBODY: CPT

## 2022-07-02 PROCEDURE — 36415 COLL VENOUS BLD VENIPUNCTURE: CPT

## 2022-07-02 PROCEDURE — 74011250636 HC RX REV CODE- 250/636: Performed by: INTERNAL MEDICINE

## 2022-07-02 PROCEDURE — 74011000250 HC RX REV CODE- 250: Performed by: NURSE PRACTITIONER

## 2022-07-02 PROCEDURE — 83036 HEMOGLOBIN GLYCOSYLATED A1C: CPT

## 2022-07-02 PROCEDURE — 74011636637 HC RX REV CODE- 636/637: Performed by: INTERNAL MEDICINE

## 2022-07-02 PROCEDURE — 74011000258 HC RX REV CODE- 258: Performed by: INTERNAL MEDICINE

## 2022-07-02 PROCEDURE — 74011250637 HC RX REV CODE- 250/637: Performed by: STUDENT IN AN ORGANIZED HEALTH CARE EDUCATION/TRAINING PROGRAM

## 2022-07-02 PROCEDURE — 85025 COMPLETE CBC W/AUTO DIFF WBC: CPT

## 2022-07-02 PROCEDURE — 72141 MRI NECK SPINE W/O DYE: CPT

## 2022-07-02 PROCEDURE — 74011250637 HC RX REV CODE- 250/637: Performed by: FAMILY MEDICINE

## 2022-07-02 RX ORDER — LANOLIN ALCOHOL/MO/W.PET/CERES
5 CREAM (GRAM) TOPICAL
Status: DISCONTINUED | OUTPATIENT
Start: 2022-07-02 | End: 2022-07-07 | Stop reason: HOSPADM

## 2022-07-02 RX ORDER — DEXTROSE MONOHYDRATE 50 MG/ML
50 INJECTION, SOLUTION INTRAVENOUS CONTINUOUS
Status: DISCONTINUED | OUTPATIENT
Start: 2022-07-02 | End: 2022-07-02

## 2022-07-02 RX ORDER — 3% SODIUM CHLORIDE 3 G/100ML
40 INJECTION, SOLUTION INTRAVENOUS CONTINUOUS
Status: DISPENSED | OUTPATIENT
Start: 2022-07-02 | End: 2022-07-02

## 2022-07-02 RX ADMIN — DESMOPRESSIN ACETATE 2 MCG: 4 SOLUTION INTRAVENOUS at 17:04

## 2022-07-02 RX ADMIN — Medication 2 UNITS: at 11:58

## 2022-07-02 RX ADMIN — HEPARIN SODIUM 5000 UNITS: 5000 INJECTION INTRAVENOUS; SUBCUTANEOUS at 00:13

## 2022-07-02 RX ADMIN — ATORVASTATIN CALCIUM 40 MG: 20 TABLET, FILM COATED ORAL at 09:38

## 2022-07-02 RX ADMIN — Medication 5 UNITS: at 21:02

## 2022-07-02 RX ADMIN — ACETAMINOPHEN 650 MG: 325 TABLET ORAL at 00:16

## 2022-07-02 RX ADMIN — METOCLOPRAMIDE 10 MG: 10 TABLET ORAL at 11:59

## 2022-07-02 RX ADMIN — SODIUM CHLORIDE 40 ML/HR: 3 INJECTION, SOLUTION INTRAVENOUS at 06:14

## 2022-07-02 RX ADMIN — Medication 10 ML: at 06:31

## 2022-07-02 RX ADMIN — Medication 20 ML: at 06:00

## 2022-07-02 RX ADMIN — ALPRAZOLAM 0.25 MG: 0.25 TABLET ORAL at 04:57

## 2022-07-02 RX ADMIN — Medication 2 UNITS: at 17:01

## 2022-07-02 RX ADMIN — Medication 10 ML: at 13:46

## 2022-07-02 RX ADMIN — SODIUM CHLORIDE 40 MG: 9 INJECTION, SOLUTION INTRAMUSCULAR; INTRAVENOUS; SUBCUTANEOUS at 09:37

## 2022-07-02 RX ADMIN — METOCLOPRAMIDE 10 MG: 10 TABLET ORAL at 06:30

## 2022-07-02 RX ADMIN — HEPARIN SODIUM 5000 UNITS: 5000 INJECTION INTRAVENOUS; SUBCUTANEOUS at 09:37

## 2022-07-02 RX ADMIN — Medication 5 UNITS: at 06:30

## 2022-07-02 RX ADMIN — DEXTROSE MONOHYDRATE 50 ML/HR: 5 INJECTION, SOLUTION INTRAVENOUS at 17:04

## 2022-07-02 RX ADMIN — ALPRAZOLAM 0.25 MG: 0.25 TABLET ORAL at 21:02

## 2022-07-02 RX ADMIN — Medication 4.5 MG: at 21:02

## 2022-07-02 RX ADMIN — HEPARIN SODIUM 5000 UNITS: 5000 INJECTION INTRAVENOUS; SUBCUTANEOUS at 17:00

## 2022-07-02 RX ADMIN — SODIUM CHLORIDE 40 MG: 9 INJECTION, SOLUTION INTRAMUSCULAR; INTRAVENOUS; SUBCUTANEOUS at 20:09

## 2022-07-02 RX ADMIN — Medication 20 ML: at 22:00

## 2022-07-02 RX ADMIN — METOCLOPRAMIDE 10 MG: 10 TABLET ORAL at 21:02

## 2022-07-02 RX ADMIN — METOCLOPRAMIDE 10 MG: 10 TABLET ORAL at 17:05

## 2022-07-02 RX ADMIN — Medication 20 ML: at 00:00

## 2022-07-02 NOTE — PROGRESS NOTES
0700 - Bedside shift change report given to Carmen Tran RN (oncoming nurse) by Lety Smith RN (offgoing nurse). Report included the following information SBAR, Kardex, ED Summary, Intake/Output, MAR, Accordion, Recent Results, Med Rec Status and Cardiac Rhythm NSR.       0830 - During rounds with intensivist, pt family asking if pt may get out of bed. RN and physician notified pt and family that we will place PT consult, and that pt will need to be evaluated before getting out of bed by PT.    1300 - pt family asking if pt can get out of bed. RN explained that PT has been consulted, but since it is a weekend, their priority are patients being discharged and their family member may see PT closer to the end of the day. RN again educated family that PT must see pt prior to her getting out of bed for the  first time. RN checked with Dr. Darin Gold to see if RN could assist pt to chair or if she prefered PT work with the patient first. MD stated she would prefer pt wait until PT is available. 1330 - Notified Dr. Marcellus Ramirez that sodium is now 80.     1445 - Dr. Marcellus Ramirez from nephrology returned call and ordered a STAT sodium level to be drawn. MD concerned with high level of output. 1550 - patient off unit down to MRI with RN and PCT    1600 - pt returned to room. 1605 - Paged Dr. Marcellus Ramirez to notify of sodium level 122. Orders received to give D5 at 50mL/hour for 2 hours and 2mcg of desmopressin over 1 hour. Orders placed. 1900 - Bedside shift change report given to Lety Smith RN (oncoming nurse) by Carmen Tran RN (offgoing nurse). Report included the following information SBAR, Kardex, ED Summary, Intake/Output, MAR, Accordion, Recent Results, Med Rec Status and Cardiac Rhythm NSR.

## 2022-07-02 NOTE — PROGRESS NOTES
Progress Note  Date:2022       Room:[address] [place] [address]  Patient Le Delatorre     YOB: 1960     Age:62 y.o. Subjective    Subjective awake; interactive; afebrile; on 3%  Review of Systems denied fever, chills, focal weakness; neck pain reported; remainder ros limited  Objective         Vitals Last 24 Hours:  TEMPERATURE:  Temp  Av.9 °F (36.6 °C)  Min: 97.6 °F (36.4 °C)  Max: 98.2 °F (36.8 °C)  RESPIRATIONS RANGE: Resp  Av.4  Min: 10  Max: 30  PULSE OXIMETRY RANGE: SpO2  Av.2 %  Min: 97 %  Max: 100 %  PULSE RANGE: Pulse  Av.6  Min: 64  Max: 100  BLOOD PRESSURE RANGE: Systolic (66JDU), ONR:851 , Min:85 , HCA:169   ; Diastolic (00ZLD), CFK:84, Min:53, Max:93    I/O (24Hr):     Intake/Output Summary (Last 24 hours) at 2022 0724  Last data filed at 2022 5859  Gross per 24 hour   Intake 1611.75 ml   Output  ml   Net -413.25 ml     Objective   Exam facilitated by use of telemedicine platform and with assistance from in house team  Gen - Awake and interactive; no acute distress; full sentences  Head - nc/at  Eyes - anicteric sclera  Ent - normal external anatomy  Cvs - sinus rhythm without external evidence of hypoperfusion  Pulm - normal WOB and adequate SaO2 on RA  GI-no evidence of tenderness with passive movement; no bruising or ecchymosis  Ext - no c/c/e  Msk - normal bulk  Neuro - oriented, no deficits in strength noted; no tremor; follows commands  Labs/Imaging/Diagnostics    Labs:  CBC:  Recent Labs     22  0410 22  0200 22  1750   WBC 13.1* 13.1* 15.7*   RBC 3.39* 3.58* 4.18   HGB UNABLE TO OBTAIN RESULT DUE TO HYPONATREMIA 10.7* Unable to obtain result due to hyponatremia   HCT 26.1* 28.5* 32.5*   MCV 77.0* 79.6* 77.8*   RDW 11.2* 11.2* 11.4*    346 410*     CHEMISTRIES:  Recent Labs     22  0410 22  2117 22  1733 22  0600 22  0200   * 110* 112*   < > 117*   K 3.6 3.8 3.7   < > 3.8   CL 83* 81* 80*   < > 86*   CO2 18* 18* 19*   < > 20*   BUN 16 17 14   < > 19   CA 7.6* 8.0* 7.8*   < > 8.1*   MG  --   --   --   --  1.7    < > = values in this interval not displayed. PT/INR:  Recent Labs     06/29/22  1826   INR 1.0     APTT:  Recent Labs     06/29/22 1826   APTT 21.7*     LIVER PROFILE:  Recent Labs     06/29/22  1750   AST 27   ALT 28     Lab Results   Component Value Date/Time    ALT (SGPT) 28 06/29/2022 05:50 PM    AST (SGOT) 27 06/29/2022 05:50 PM    Alk. phosphatase 54 06/29/2022 05:50 PM    Bilirubin, total 0.6 06/29/2022 05:50 PM       Imaging Last 24 Hours:  XR CHEST PORT    Result Date: 7/1/2022  INDICATION:  TLC placed EXAM: Chest single view. COMPARISON: 2/8/2018. FINDINGS: A single frontal view of the chest at 1518 hours shows a central venous catheter via the right IJ approach with its tip projecting over the right atrium. There is no pneumothorax. Mild diffuse interstitial prominence is stable. .  The heart, mediastinum and pulmonary vasculature are stable . The bony thorax is unremarkable for age. Rakesh Founds No pneumothorax status post right central venous catheter placement. .  . Assessment//Plan   Active Problems:    Hyponatremia (2/7/2018)      Nausea and vomiting (6/29/2022)      Type 2 diabetes mellitus with hyperglycemia ([facility] [address] (6/29/2022)      Upper GI bleeding (6/29/2022)      Assessment & Plan  62F with past medical history of type 2 diabetes mellitus, Hypertension, GERD and chronic neck pain who presented to the ED on 6/29 with abdominal pain, nausea and vomiting. Evaluation showed a sodium level of 114 and was started on 3% saline and admitted to ICU for further management. Periodic hypoglycemia improved with increased intake.     Neuro - pain controlled and CAM negative; that said, family reported ongoing neck pain from home; no focal deficits and variable types/radiation reported - may require workup down the line    Cvs - acceptable hemodynamics without evidence of hypoperfusion or ischemia  -antihypertensives previously adjusted and will follow MAPs    Pulm - adequate oxygenation on low dose supplemental O2; normal WOB    GI - improved abdominal sx and tolerating increasing some po  - initial N and V likely from hyponatremia and to lesser extent, gastroparesis  -h/o GERD and can continue any relevant home meds   -although previously reported, patient denied any coffee-ground emesis       - required additional 3% overnight; hyponatremia likely related to SIADH/since d/c home meds; defer to nephro regarding subsequent dosing  - normal appearing output    Heme-on dvt proph without evidence of hemorrhage  -No acute need for transfusion of blood products     ID - No evidence of an acute infection    Endo -  h/o DM and on long acting insulin; improved ranges with return of appetite          CCT 40minutes excluding teaching and procedures    I performed all aspects of the physical examination via Telemedicine associated with two way audio and video communication and with the on-site assistance of the bedside nurse. I am located in [place] and the patient is located in [place] at [address] [place] [address]   The patient is critically ill in the ICU. I  personally  reviewed the pertinent medical records, laboratory/ pathology data and radiographic images. The decision making regarding this patient is as documented above, which was generated  following  discussion  with the multidisciplinary ICU team and creation of a treatment plan for  the patient. We discussed the patient's interval history and future coordination of care and  plans. The patient's medications  were reviewed and changes made as stipulated above. Due to  critical illness impairing one or more vital organs of this patient resulting in life threatening clinical situation  I have provided direct, frequent personal  assessment and manipulation in management plan.   Electronically signed by Zabrina Nguyen Devendra Sheehan MD on 7/2/2022 at 7:24 AM

## 2022-07-02 NOTE — PROGRESS NOTES
Overnight Nephrology Cross Cover  6p-6a    Paged with repeat sodium level  110<112 s/p 3%--> 20 ml/hr times 8 hours  Will order another infusion of 3%--> 40 ml/hr times 4 hours, total volume 160 ml  Urine sodium 125, osmo 468  UOP 60 ml/hr over the last 4- hours  Repeat sodium level after infusion of 3%  Please call nephrology team on-call with repeat results    0520: Paged with repeat serum sodium results   114 (corrected)<110<112  Will order another infusion of 3%--> 40 ml/hr times 4 hours, total volume 160 ml  Repeat sodium level after infusion of 3%  Please call nephrology team on-call with repeat results      Sugar Linares, [facility]

## 2022-07-02 NOTE — PROGRESS NOTES
Sohail Calhoun irene Ballinger 79  3[address] [facility], [address] [place] [address]  [phone] [address] Adult  Hospitalist Group                                                                                          Hospitalist Progress Note  Marie Maravilla MD        Date of Service:  2022  NAME:  Hamilton Barraza  :  1960  MRN:  [id number]      Admission Summary:   58 to female admitted with hyponatremia    Interval history / Subjective:   No complaints currently, but reports having a lot of gas, dyspepsia and intermittent neck pain with hand numbness     Assessment & Plan:     Hyponatremia (2018)  -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  -cont regular diet, cancel GI consult     Nausea and vomiting/dyspepsia (2022)  -likely from hyponatremia, gastroparesis  -now tolerating diet  -Protonix IV, scheduled reglan PO  -prn simethicone  -has a history of H. Pylori, so will check serum h. Pylori abs     Type 2 diabetes mellitus with hyperglycemia (HCC) (2022)  -A1C 7.9  -Monitor blood sugar  -Correctional insulin  -Resume her Lantus 40 units daily at home    HTN  -cont exforge  -dc hctz    Code status: full  DVT prophylaxis: [facility]CD[facility]       Hospital Problems  Date Reviewed: 2018          Codes Class Noted POA    Nausea and vomiting ICD-10-CM: R11.2  ICD-9-CM: 787.01  2022 Yes        Type 2 diabetes mellitus with hyperglycemia ([facility] [address] ICD-10-CM: E11.65  ICD-9-CM: 250.00  2022 Yes        Upper GI bleeding ICD-10-CM: K92.2  ICD-9-CM: 578.9  2022 Yes        Hyponatremia ICD-10-CM: E87.1  ICD-9-CM: 276.1  2018 Unknown                Review of Systems:   A comprehensive review of systems was negative except for that written in the HPI.        Vital Signs:    Last 24hrs VS reviewed since prior progress note. Most recent are:  Visit Vitals  /68   Pulse 84   Temp 97.8 °F (36.6 °C)   Resp 17   Wt 74.8 kg (165 lb)   SpO2 100%   Breastfeeding No   BMI 35.60 kg/m²         Intake/Output Summary (Last 24 hours) at 7/2/2022 1409  Last data filed at 7/2/2022 1200  Gross per 24 hour   Intake 962.17 ml   Output 2875 ml   Net -1912.83 ml        Physical Examination:             Constitutional:  No acute distress, cooperative, pleasant    ENT:  Oral mucosa moist, oropharynx benign. Resp:  CTA bilaterally. No wheezing/rhonchi/rales. No accessory muscle use   CV:  Regular rhythm, normal rate, no murmurs, gallops, rubs    GI:  Soft, non distended, non tender. normoactive bowel sounds, no hepatosplenomegaly     Musculoskeletal:  No edema, warm, 2+ pulses throughout    Neurologic:  Moves all extremities. AAOx3, CN II-XII reviewed     Psych:  Good insight, Not anxious nor agitated. Data Review:    Review and/or order of clinical lab test      Labs:     Recent Labs     07/02/22  0410 06/30/22  0200   WBC 13.1* 13.1*   HGB UNABLE TO OBTAIN RESULT DUE TO HYPONATREMIA 10.7*   HCT 26.1* 28.5*    346     Recent Labs     07/02/22  1203 07/02/22  0410 07/01/22  2117 06/30/22  0600 06/30/22  0200   * 112* 110*   < > 117*   K 3.9 3.6 3.8   < > 3.8   CL 91* 83* 81*   < > 86*   CO2 19* 18* 18*   < > 20*   BUN 16 16 17   < > 19   CREA 0.86 0.82 0.75   < > 0.84   * 289* 214*   < > 180*   CA 8.2* 7.6* 8.0*   < > 8.1*   MG  --   --   --   --  1.7    < > = values in this interval not displayed. Recent Labs     06/29/22  1750   ALT 28   AP 54   TBILI 0.6   TP 6.8   ALB 3.2*   GLOB 3.6     Recent Labs     06/29/22  1826   INR 1.0   PTP 10.9   APTT 21.7*      No results for input(s): FE, TIBC, PSAT, FERR in the last 72 hours. No results found for: FOL, RBCF   No results for input(s): PH, PCO2, PO2 in the last 72 hours.   No results for input(s): CPK, CKNDX, TROIQ in the last 72 hours.    No lab exists for component: CPKMB  Lab Results   Component Value Date/Time    Cholesterol, total 141 02/09/2018 05:14 AM    HDL Cholesterol 36 02/09/2018 05:14 AM    LDL, calculated 75.8 02/09/2018 05:14 AM    Triglyceride 146 02/09/2018 05:14 AM    CHOL/HDL Ratio 3.9 02/09/2018 05:14 AM     Lab Results   Component Value Date/Time    Glucose (POC) 174 (H) 07/02/2022 11:44 AM    Glucose (POC) 266 (H) 07/02/2022 06:27 AM    Glucose (POC) 271 (H) 07/02/2022 02:53 AM    Glucose (POC) 227 (H) 07/01/2022 09:27 PM    Glucose (POC) 176 (H) 07/01/2022 04:04 PM     Lab Results   Component Value Date/Time    Color PINK 06/29/2022 08:19 PM    Appearance CLEAR 06/29/2022 08:19 PM    Specific gravity 1.013 06/29/2022 08:19 PM    pH (UA) 7.5 06/29/2022 08:19 PM    Protein 100 (A) 06/29/2022 08:19 PM    Glucose Negative 06/29/2022 08:19 PM    Ketone Negative 06/29/2022 08:19 PM    Bilirubin Negative 06/29/2022 08:19 PM    Urobilinogen 0.2 06/29/2022 08:19 PM    Nitrites Negative 06/29/2022 08:19 PM    Leukocyte Esterase Negative 06/29/2022 08:19 PM    Epithelial cells FEW 06/29/2022 08:19 PM    Bacteria Negative 06/29/2022 08:19 PM    WBC 0-4 06/29/2022 08:19 PM    RBC 0-5 06/29/2022 08:19 PM         Medications Reviewed:     Current Facility-Administered Medications   Medication Dose Route Frequency    melatonin tablet 4.5 mg  4.5 mg Oral QHS PRN    glucose chewable tablet 16 g  4 Tablet Oral PRN    glucagon (GLUCAGEN) injection 1 mg  1 mg IntraMUSCular PRN    dextrose 10% infusion 0-250 mL  0-250 mL IntraVENous PRN    simethicone (MYLICON) tablet 80 mg  80 mg Oral QID PRN    heparin (porcine) injection 5,000 Units  5,000 Units SubCUTAneous Q8H    metoclopramide HCl (REGLAN) tablet 10 mg  10 mg Oral AC&HS    sodium chloride (NS) flush 5-40 mL  5-40 mL IntraVENous Q8H    sodium chloride (NS) flush 5-40 mL  5-40 mL IntraVENous Q8H    sodium chloride (NS) flush 5-40 mL  5-40 mL IntraVENous PRN    acetaminophen (TYLENOL) tablet 650 mg  650 mg Oral Q6H PRN    Or    acetaminophen (TYLENOL) suppository 650 mg  650 mg Rectal Q6H PRN    polyethylene glycol (MIRALAX) packet 17 g  17 g Oral DAILY PRN    ondansetron (ZOFRAN ODT) tablet 4 mg  4 mg Oral Q8H PRN    Or    ondansetron (ZOFRAN) injection 4 mg  4 mg IntraVENous Q6H PRN    pantoprazole (PROTONIX) 40 mg in 0.9% sodium chloride 10 mL injection  40 mg IntraVENous Q12H    ALPRAZolam (XANAX) tablet 0.25 mg  0.25 mg Oral QHS PRN    atorvastatin (LIPITOR) tablet 40 mg  40 mg Oral DAILY    [Held by provider] insulin glargine (LANTUS) injection 40 Units  40 Units SubCUTAneous QHS    insulin lispro (HUMALOG) injection   SubCUTAneous AC&HS    amLODIPine/valsartan (EXFORGE) 10/320 mg   Oral DAILY     ______________________________________________________________________  EXPECTED LENGTH OF STAY: 2d 16h  ACTUAL LENGTH OF STAY:          3                 Kateryna Tillman MD

## 2022-07-02 NOTE — PROGRESS NOTES
June Folks  YOB: 1960      Assessment & Plan:     Hypo-osmolar hyponatremia d/t SIADH in the setting of N/V and abdominal pain, h/o thiazide diuretics  HTN  DM-2    Plan :  - complete current bag of 3% saline   - labs at noon and every 6 hrs   - Goal Na 116 by the end of the ay shift and 120 by tomorrow am   - consider CT /MRI of neck for pain    D/w pt, family and RN            Subjective:   CC: f/u Hyponatremia    Seen and examined.  Na at 112 and getting 3% saline   C/o neck pain - 1 m duration and worsening   No further N/V/D    ROS:  Current Facility-Administered Medications   Medication Dose Route Frequency    melatonin tablet 4.5 mg  4.5 mg Oral QHS PRN    3% sodium chloride (*HIGH ALERT*CONCENTRATED IV*) infusion  40 mL/hr IntraVENous CONTINUOUS    glucose chewable tablet 16 g  4 Tablet Oral PRN    glucagon (GLUCAGEN) injection 1 mg  1 mg IntraMUSCular PRN    dextrose 10% infusion 0-250 mL  0-250 mL IntraVENous PRN    simethicone (MYLICON) tablet 80 mg  80 mg Oral QID PRN    heparin (porcine) injection 5,000 Units  5,000 Units SubCUTAneous Q8H    metoclopramide HCl (REGLAN) tablet 10 mg  10 mg Oral AC&HS    sodium chloride (NS) flush 5-40 mL  5-40 mL IntraVENous Q8H    sodium chloride (NS) flush 5-40 mL  5-40 mL IntraVENous Q8H    sodium chloride (NS) flush 5-40 mL  5-40 mL IntraVENous PRN    acetaminophen (TYLENOL) tablet 650 mg  650 mg Oral Q6H PRN    Or    acetaminophen (TYLENOL) suppository 650 mg  650 mg Rectal Q6H PRN    polyethylene glycol (MIRALAX) packet 17 g  17 g Oral DAILY PRN    ondansetron (ZOFRAN ODT) tablet 4 mg  4 mg Oral Q8H PRN    Or    ondansetron (ZOFRAN) injection 4 mg  4 mg IntraVENous Q6H PRN    pantoprazole (PROTONIX) 40 mg in 0.9% sodium chloride 10 mL injection  40 mg IntraVENous Q12H    ALPRAZolam (XANAX) tablet 0.25 mg  0.25 mg Oral QHS PRN    atorvastatin (LIPITOR) tablet 40 mg  40 mg Oral DAILY    [Held by provider] insulin glargine (LANTUS) injection 40 Units  40 Units SubCUTAneous QHS    insulin lispro (HUMALOG) injection   SubCUTAneous AC&HS    amLODIPine/valsartan (EXFORGE) 10/320 mg   Oral DAILY          Objective:     Vitals:  Blood pressure (!) 120/59, pulse 75, temperature 97.8 °F (36.6 °C), resp. rate 19, weight 74.8 kg (165 lb), SpO2 100 %, not currently breastfeeding. Temp (24hrs), Av °F (36.7 °C), Min:97.7 °F (36.5 °C), Max:98.2 °F (36.8 °C)      Intake and Output:  No intake/output data recorded.  190 -  0700  In: 1861.8 [P.O.:1040; I.V.:821.8]  Out: 3710 [Urine:3710]    Physical Exam:                   Physical Examination:   GENERAL ASSESSMENT: NAD  HEENT:Nontraumatic   CHEST: CTA  HEART: S1S2  ABDOMEN: Soft,NT,  :[place]:   EXTREMITY: no EDEMA  NEURO:Grossly non focal          ECG/rhythm:    Data Review      No results for input(s): TNIPOC in the last 72 hours. No lab exists for component: ITNL   No results for input(s): CPK, CKMB, TROIQ in the last 72 hours. Recent Labs     22  0410 22  2117 22  1733 22  0600 22  0200 22  2237 22  1750   * 110* 112*   < > 117*   < > 114*   K 3.6 3.8 3.7   < > 3.8   < > 4.2   CL 83* 81* 80*   < > 86*   < > 81*   CO2 18* 18* 19*   < > 20*   < > 20*   BUN 16 17 14   < > 19   < > 21*   CREA 0.82 0.75 0.76   < > 0.84   < > 0.88   * 214* 176*   < > 180*   < > 191*   MG  --   --   --   --  1.7  --   --    CA 7.6* 8.0* 7.8*   < > 8.1*   < > 8.9   ALB  --   --   --   --   --   --  3.2*   WBC 13.1*  --   --   --  13.1*  --  15.7*   HGB UNABLE TO OBTAIN RESULT DUE TO HYPONATREMIA  --   --   --  10.7*  --  Unable to obtain result due to hyponatremia   HCT 26.1*  --   --   --  28.5*  --  32.5*     --   --   --  346  --  410*    < > = values in this interval not displayed.       Recent Labs     22  1826   INR 1.0   PTP 10.9   APTT 21.7*     Needs: urine analysis, urine sodium, protein and creatinine  Lab Results   Component Value Date/Time    Sodium,urine random 125 07/01/2022 09:04 AM    Creatinine, urine <13.00 06/29/2022 10:37 PM               Discussed with:  Staff    : Aisha Barahona MD  7/2/2022        [address] [place] [address] [facility]:  [email or web address]  Shannon Rued office:  [address], [address] [place] [address],[address]  [address]  Phone: [phone]  Fax :     [phone]    [address] [place] [address] office:  [address], [address]   Phone - [phone]  Fax - [phone]

## 2022-07-03 ENCOUNTER — APPOINTMENT (OUTPATIENT)
Dept: GENERAL RADIOLOGY | Age: 62
DRG: 048 | End: 2022-07-03
Attending: INTERNAL MEDICINE
Payer: MEDICAID

## 2022-07-03 ENCOUNTER — APPOINTMENT (OUTPATIENT)
Dept: CT IMAGING | Age: 62
DRG: 048 | End: 2022-07-03
Attending: FAMILY MEDICINE
Payer: MEDICAID

## 2022-07-03 ENCOUNTER — APPOINTMENT (OUTPATIENT)
Dept: MRI IMAGING | Age: 62
DRG: 048 | End: 2022-07-03
Attending: FAMILY MEDICINE
Payer: MEDICAID

## 2022-07-03 LAB
ANION GAP SERPL CALC-SCNC: 10 MMOL/L (ref 5–15)
ANION GAP SERPL CALC-SCNC: 11 MMOL/L (ref 5–15)
ANION GAP SERPL CALC-SCNC: 11 MMOL/L (ref 5–15)
ANION GAP SERPL CALC-SCNC: 8 MMOL/L (ref 5–15)
ANION GAP SERPL CALC-SCNC: 8 MMOL/L (ref 5–15)
BASOPHILS # BLD: 0.1 K/UL (ref 0–0.1)
BASOPHILS NFR BLD: 0 % (ref 0–1)
BUN SERPL-MCNC: 19 MG/DL (ref 6–20)
BUN SERPL-MCNC: 21 MG/DL (ref 6–20)
BUN SERPL-MCNC: 21 MG/DL (ref 6–20)
BUN SERPL-MCNC: 23 MG/DL (ref 6–20)
BUN SERPL-MCNC: 23 MG/DL (ref 6–20)
BUN/CREAT SERPL: 23 (ref 12–20)
BUN/CREAT SERPL: 24 (ref 12–20)
BUN/CREAT SERPL: 28 (ref 12–20)
BUN/CREAT SERPL: 33 (ref 12–20)
BUN/CREAT SERPL: 38 (ref 12–20)
CALCIUM SERPL-MCNC: 7.9 MG/DL (ref 8.5–10.1)
CALCIUM SERPL-MCNC: 8 MG/DL (ref 8.5–10.1)
CALCIUM SERPL-MCNC: 8 MG/DL (ref 8.5–10.1)
CALCIUM SERPL-MCNC: 8.1 MG/DL (ref 8.5–10.1)
CALCIUM SERPL-MCNC: 8.3 MG/DL (ref 8.5–10.1)
CHLORIDE SERPL-SCNC: 83 MMOL/L (ref 97–108)
CHLORIDE SERPL-SCNC: 90 MMOL/L (ref 97–108)
CHLORIDE SERPL-SCNC: 91 MMOL/L (ref 97–108)
CO2 SERPL-SCNC: 19 MMOL/L (ref 21–32)
CO2 SERPL-SCNC: 20 MMOL/L (ref 21–32)
CO2 SERPL-SCNC: 20 MMOL/L (ref 21–32)
CO2 SERPL-SCNC: 21 MMOL/L (ref 21–32)
CO2 SERPL-SCNC: 21 MMOL/L (ref 21–32)
COMMENT, HOLDF: NORMAL
CREAT SERPL-MCNC: 0.6 MG/DL (ref 0.55–1.02)
CREAT SERPL-MCNC: 0.64 MG/DL (ref 0.55–1.02)
CREAT SERPL-MCNC: 0.82 MG/DL (ref 0.55–1.02)
CREAT SERPL-MCNC: 0.82 MG/DL (ref 0.55–1.02)
CREAT SERPL-MCNC: 0.87 MG/DL (ref 0.55–1.02)
DIFFERENTIAL METHOD BLD: ABNORMAL
EOSINOPHIL # BLD: 0.1 K/UL (ref 0–0.4)
EOSINOPHIL NFR BLD: 1 % (ref 0–7)
ERYTHROCYTE [DISTWIDTH] IN BLOOD BY AUTOMATED COUNT: 11.5 % (ref 11.5–14.5)
GLUCOSE BLD STRIP.AUTO-MCNC: 164 MG/DL (ref 65–117)
GLUCOSE BLD STRIP.AUTO-MCNC: 195 MG/DL (ref 65–117)
GLUCOSE BLD STRIP.AUTO-MCNC: 211 MG/DL (ref 65–117)
GLUCOSE BLD STRIP.AUTO-MCNC: 234 MG/DL (ref 65–117)
GLUCOSE BLD STRIP.AUTO-MCNC: 239 MG/DL (ref 65–117)
GLUCOSE SERPL-MCNC: 150 MG/DL (ref 65–100)
GLUCOSE SERPL-MCNC: 164 MG/DL (ref 65–100)
GLUCOSE SERPL-MCNC: 205 MG/DL (ref 65–100)
GLUCOSE SERPL-MCNC: 206 MG/DL (ref 65–100)
GLUCOSE SERPL-MCNC: 232 MG/DL (ref 65–100)
HCT VFR BLD AUTO: 25.3 % (ref 35–47)
HGB BLD-MCNC: 9.5 G/DL (ref 11.5–16)
IMM GRANULOCYTES # BLD AUTO: 0.1 K/UL (ref 0–0.04)
IMM GRANULOCYTES NFR BLD AUTO: 1 % (ref 0–0.5)
LYMPHOCYTES # BLD: 5.7 K/UL (ref 0.8–3.5)
LYMPHOCYTES NFR BLD: 38 % (ref 12–49)
MAGNESIUM SERPL-MCNC: 1.7 MG/DL (ref 1.6–2.4)
MCH RBC QN AUTO: 29.7 PG (ref 26–34)
MCHC RBC AUTO-ENTMCNC: 37.5 G/DL (ref 30–36.5)
MCV RBC AUTO: 79.1 FL (ref 80–99)
MONOCYTES # BLD: 1.4 K/UL (ref 0–1)
MONOCYTES NFR BLD: 9 % (ref 5–13)
NEUTS SEG # BLD: 7.6 K/UL (ref 1.8–8)
NEUTS SEG NFR BLD: 51 % (ref 32–75)
NRBC # BLD: 0 K/UL (ref 0–0.01)
NRBC BLD-RTO: 0 PER 100 WBC
PHOSPHATE SERPL-MCNC: 1.5 MG/DL (ref 2.6–4.7)
PLATELET # BLD AUTO: 367 K/UL (ref 150–400)
PMV BLD AUTO: 8.8 FL (ref 8.9–12.9)
POTASSIUM SERPL-SCNC: 3.6 MMOL/L (ref 3.5–5.1)
POTASSIUM SERPL-SCNC: 3.6 MMOL/L (ref 3.5–5.1)
POTASSIUM SERPL-SCNC: 3.9 MMOL/L (ref 3.5–5.1)
POTASSIUM SERPL-SCNC: 4.2 MMOL/L (ref 3.5–5.1)
POTASSIUM SERPL-SCNC: 4.7 MMOL/L (ref 3.5–5.1)
RBC # BLD AUTO: 3.2 M/UL (ref 3.8–5.2)
SAMPLES BEING HELD,HOLD: NORMAL
SERVICE CMNT-IMP: ABNORMAL
SODIUM SERPL-SCNC: 114 MMOL/L (ref 136–145)
SODIUM SERPL-SCNC: 117 MMOL/L (ref 136–145)
SODIUM SERPL-SCNC: 119 MMOL/L (ref 136–145)
SODIUM SERPL-SCNC: 121 MMOL/L (ref 136–145)
SODIUM SERPL-SCNC: 122 MMOL/L (ref 136–145)
TROPONIN-HIGH SENSITIVITY: 9 NG/L (ref 0–51)
TROPONIN-HIGH SENSITIVITY: 9 NG/L (ref 0–51)
WBC # BLD AUTO: 14.9 K/UL (ref 3.6–11)

## 2022-07-03 PROCEDURE — 97161 PT EVAL LOW COMPLEX 20 MIN: CPT

## 2022-07-03 PROCEDURE — 74011250636 HC RX REV CODE- 250/636: Performed by: NURSE PRACTITIONER

## 2022-07-03 PROCEDURE — 0042T CT CODE NEURO PERF W CBF: CPT

## 2022-07-03 PROCEDURE — 80048 BASIC METABOLIC PNL TOTAL CA: CPT

## 2022-07-03 PROCEDURE — 74011250637 HC RX REV CODE- 250/637: Performed by: HOSPITALIST

## 2022-07-03 PROCEDURE — 74011000250 HC RX REV CODE- 250: Performed by: INTERNAL MEDICINE

## 2022-07-03 PROCEDURE — 93005 ELECTROCARDIOGRAM TRACING: CPT

## 2022-07-03 PROCEDURE — 70496 CT ANGIOGRAPHY HEAD: CPT

## 2022-07-03 PROCEDURE — C9113 INJ PANTOPRAZOLE SODIUM, VIA: HCPCS | Performed by: INTERNAL MEDICINE

## 2022-07-03 PROCEDURE — 97116 GAIT TRAINING THERAPY: CPT

## 2022-07-03 PROCEDURE — 36415 COLL VENOUS BLD VENIPUNCTURE: CPT

## 2022-07-03 PROCEDURE — 74011250637 HC RX REV CODE- 250/637: Performed by: INTERNAL MEDICINE

## 2022-07-03 PROCEDURE — 74011250636 HC RX REV CODE- 250/636: Performed by: INTERNAL MEDICINE

## 2022-07-03 PROCEDURE — 71045 X-RAY EXAM CHEST 1 VIEW: CPT

## 2022-07-03 PROCEDURE — 4A03X5D MEASUREMENT OF ARTERIAL FLOW, INTRACRANIAL, EXTERNAL APPROACH: ICD-10-PCS | Performed by: INTERNAL MEDICINE

## 2022-07-03 PROCEDURE — 70450 CT HEAD/BRAIN W/O DYE: CPT

## 2022-07-03 PROCEDURE — 74011250637 HC RX REV CODE- 250/637: Performed by: FAMILY MEDICINE

## 2022-07-03 PROCEDURE — 74011250637 HC RX REV CODE- 250/637: Performed by: STUDENT IN AN ORGANIZED HEALTH CARE EDUCATION/TRAINING PROGRAM

## 2022-07-03 PROCEDURE — 82962 GLUCOSE BLOOD TEST: CPT

## 2022-07-03 PROCEDURE — 85025 COMPLETE CBC W/AUTO DIFF WBC: CPT

## 2022-07-03 PROCEDURE — 74011636637 HC RX REV CODE- 636/637: Performed by: INTERNAL MEDICINE

## 2022-07-03 PROCEDURE — 74011000250 HC RX REV CODE- 250: Performed by: NURSE PRACTITIONER

## 2022-07-03 PROCEDURE — 70551 MRI BRAIN STEM W/O DYE: CPT

## 2022-07-03 PROCEDURE — 83735 ASSAY OF MAGNESIUM: CPT

## 2022-07-03 PROCEDURE — 84484 ASSAY OF TROPONIN QUANT: CPT

## 2022-07-03 PROCEDURE — 74011000636 HC RX REV CODE- 636: Performed by: FAMILY MEDICINE

## 2022-07-03 PROCEDURE — 65610000006 HC RM INTENSIVE CARE

## 2022-07-03 PROCEDURE — 84100 ASSAY OF PHOSPHORUS: CPT

## 2022-07-03 RX ORDER — 3% SODIUM CHLORIDE 3 G/100ML
20 INJECTION, SOLUTION INTRAVENOUS CONTINUOUS
Status: DISPENSED | OUTPATIENT
Start: 2022-07-03 | End: 2022-07-03

## 2022-07-03 RX ORDER — METOPROLOL TARTRATE 5 MG/5ML
5 INJECTION INTRAVENOUS ONCE
Status: COMPLETED | OUTPATIENT
Start: 2022-07-03 | End: 2022-07-03

## 2022-07-03 RX ORDER — SODIUM CHLORIDE 0.9 % (FLUSH) 0.9 %
5-40 SYRINGE (ML) INJECTION EVERY 8 HOURS
Status: DISCONTINUED | OUTPATIENT
Start: 2022-07-03 | End: 2022-07-07 | Stop reason: HOSPADM

## 2022-07-03 RX ORDER — POTASSIUM CHLORIDE 750 MG/1
40 TABLET, FILM COATED, EXTENDED RELEASE ORAL
Status: COMPLETED | OUTPATIENT
Start: 2022-07-03 | End: 2022-07-03

## 2022-07-03 RX ORDER — DEXTROSE MONOHYDRATE 50 MG/ML
100 INJECTION, SOLUTION INTRAVENOUS CONTINUOUS
Status: DISCONTINUED | OUTPATIENT
Start: 2022-07-03 | End: 2022-07-03

## 2022-07-03 RX ORDER — SODIUM CHLORIDE 0.9 % (FLUSH) 0.9 %
5-40 SYRINGE (ML) INJECTION AS NEEDED
Status: DISCONTINUED | OUTPATIENT
Start: 2022-07-03 | End: 2022-07-07 | Stop reason: HOSPADM

## 2022-07-03 RX ORDER — MORPHINE SULFATE 2 MG/ML
1 INJECTION, SOLUTION INTRAMUSCULAR; INTRAVENOUS ONCE
Status: COMPLETED | OUTPATIENT
Start: 2022-07-03 | End: 2022-07-03

## 2022-07-03 RX ORDER — PANTOPRAZOLE SODIUM 40 MG/1
40 TABLET, DELAYED RELEASE ORAL
Status: DISCONTINUED | OUTPATIENT
Start: 2022-07-03 | End: 2022-07-07 | Stop reason: HOSPADM

## 2022-07-03 RX ADMIN — Medication 2 UNITS: at 06:33

## 2022-07-03 RX ADMIN — DEXTROSE MONOHYDRATE 100 ML/HR: 5 INJECTION, SOLUTION INTRAVENOUS at 02:09

## 2022-07-03 RX ADMIN — HEPARIN SODIUM 5000 UNITS: 5000 INJECTION INTRAVENOUS; SUBCUTANEOUS at 09:00

## 2022-07-03 RX ADMIN — Medication 3 UNITS: at 16:55

## 2022-07-03 RX ADMIN — IOPAMIDOL 40 ML: 755 INJECTION, SOLUTION INTRAVENOUS at 14:08

## 2022-07-03 RX ADMIN — IOPAMIDOL 100 ML: 755 INJECTION, SOLUTION INTRAVENOUS at 14:07

## 2022-07-03 RX ADMIN — METOCLOPRAMIDE 10 MG: 10 TABLET ORAL at 06:33

## 2022-07-03 RX ADMIN — Medication 10 ML: at 21:27

## 2022-07-03 RX ADMIN — MORPHINE SULFATE 1 MG: 2 INJECTION, SOLUTION INTRAMUSCULAR; INTRAVENOUS at 13:39

## 2022-07-03 RX ADMIN — METOPROLOL TARTRATE 5 MG: 5 INJECTION INTRAVENOUS at 13:39

## 2022-07-03 RX ADMIN — ALPRAZOLAM 0.25 MG: 0.25 TABLET ORAL at 23:49

## 2022-07-03 RX ADMIN — POTASSIUM CHLORIDE 40 MEQ: 750 TABLET, FILM COATED, EXTENDED RELEASE ORAL at 06:03

## 2022-07-03 RX ADMIN — METOCLOPRAMIDE 10 MG: 10 TABLET ORAL at 12:47

## 2022-07-03 RX ADMIN — Medication 10 ML: at 06:00

## 2022-07-03 RX ADMIN — POTASSIUM BICARBONATE 40 MEQ: 782 TABLET, EFFERVESCENT ORAL at 12:54

## 2022-07-03 RX ADMIN — ATORVASTATIN CALCIUM 40 MG: 20 TABLET, FILM COATED ORAL at 09:00

## 2022-07-03 RX ADMIN — SODIUM CHLORIDE, PRESERVATIVE FREE 10 ML: 5 INJECTION INTRAVENOUS at 21:27

## 2022-07-03 RX ADMIN — Medication 10 ML: at 13:45

## 2022-07-03 RX ADMIN — HEPARIN SODIUM 5000 UNITS: 5000 INJECTION INTRAVENOUS; SUBCUTANEOUS at 00:46

## 2022-07-03 RX ADMIN — AMLODIPINE BESYLATE: 5 TABLET ORAL at 08:59

## 2022-07-03 RX ADMIN — METOCLOPRAMIDE 10 MG: 10 TABLET ORAL at 21:27

## 2022-07-03 RX ADMIN — ACETAMINOPHEN 650 MG: 325 TABLET ORAL at 23:49

## 2022-07-03 RX ADMIN — Medication 10 ML: at 13:44

## 2022-07-03 RX ADMIN — Medication 4.5 MG: at 21:26

## 2022-07-03 RX ADMIN — Medication 20 ML: at 06:33

## 2022-07-03 RX ADMIN — HEPARIN SODIUM 5000 UNITS: 5000 INJECTION INTRAVENOUS; SUBCUTANEOUS at 16:56

## 2022-07-03 RX ADMIN — PANTOPRAZOLE SODIUM 40 MG: 40 TABLET, DELAYED RELEASE ORAL at 16:06

## 2022-07-03 RX ADMIN — SODIUM CHLORIDE 20 ML/HR: 3 INJECTION, SOLUTION INTRAVENOUS at 15:56

## 2022-07-03 RX ADMIN — SODIUM CHLORIDE 40 MG: 9 INJECTION, SOLUTION INTRAMUSCULAR; INTRAVENOUS; SUBCUTANEOUS at 09:00

## 2022-07-03 RX ADMIN — Medication 2 UNITS: at 12:55

## 2022-07-03 RX ADMIN — Medication 2 UNITS: at 21:26

## 2022-07-03 RX ADMIN — METOCLOPRAMIDE 10 MG: 10 TABLET ORAL at 16:03

## 2022-07-03 NOTE — PROGRESS NOTES
1900-Bedside and Verbal shift change report given to MOISESlizabeth 81 (oncoming nurse) by Dov Carrillo (offgoing nurse). Report included the following information SBAR, Kardex, Intake/Output, MAR, Recent Results, Med Rec Status and Cardiac Rhythm NSR.     1930-Patient resting quietly in bed with granddaughter at bedside. 2000-Full assessment completed, see flow sheets    Neuro=A&Ox4  Cardio=NSR  Resp=RA  GI=DM diet  =Up to tiolet  Skin=Intact  Access=R IJ/TLC, R hand 20g    2115-Labs drawn for sodium check. Evening meds administered in addition to PRN Melatonin per patient request.  Patient also asked for a ginger ale to drink. 2226-Contacted on-call Nephro Dr Emily Cervantes to inform her of the patient's most recent sodium result which was 122. The Dr also requested that the patient's urine be measured moving forward. 2349-Patient up to the toilet with assistance from her granddaughter. 400 mL urine output documented. PRN Xanax and Tylenol administered. 0000-Reassessment complete, no changes noted    5958-Gy-nvrl Dr contacted for order for pain med for back pain. Request granted. PRN Tramadol ordered    0400-Reassessment complete. no changes noted     0417-PRN Tramadol administered due to back pain    1871-Nr-uzhp Nephro Dr. Emily Cervantes contacted with most recent sodium result which is 122. Will continue to monitor at this time and continue Q6hr Sodium checks. 0700-Bedside and Verbal shift change report given to [facility] (oncoming nurse) by Amanda Muller RN (offgoing nurse). Report included the following information SBAR, Kardex, Intake/Output, MAR, Recent Results, Med Rec Status and Cardiac Rhythm NSR.

## 2022-07-03 NOTE — PROGRESS NOTES
Code S called. L pupil non reactive. Chest pain/pressure, /72, LKW 1300, heparin SQ, . Pt to CT. Teleneuro md on screen to assess pt, ordered CTA. MRI was ordered and completed. Primary nurse to completed NIH scale and CHRISTA swallow eval when back from MRI.

## 2022-07-03 NOTE — PROGRESS NOTES
Sohail Calhoun irene [address]  [address]  [phone] [address] Adult  Hospitalist Group                                                                                          Hospitalist Progress Note  Miguel Bansal MD        Date of Service:  7/3/2022  NAME:  Cecile Crisostomo  :  1960  MRN:  [id number]      Admission Summary:   58 to female admitted with hyponatremia    Interval history / Subjective:   Reports lower abd pain, pressure. Questioning if rodgers is draining appropriately. Assessment & Plan:     Hyponatremia (2018)  -likely due to chronic hctz use and SIADH  -improving  -s/p 3% ns and DDAVP  -nephrology following     Upper GI bleeding (2022)  -patient reports this is not correct,  misinterpreted what she was saying.  -cont regular diet, cancel GI consult     Nausea and vomiting/dyspepsia (2022)  -likely from hyponatremia, gastroparesis  -now tolerating diet  -Protonix IV, scheduled reglan PO  -prn simethicone  -has a history of H. Pylori, so will check serum h. Pylori abs     Type 2 diabetes mellitus with hyperglycemia (HCC) (2022)  -A1C 7.9  -Monitor blood sugar  -Correctional insulin  -Resume her Lantus 40 units daily at home    HTN  -cont exforge  -dc hctz    Code status: full  DVT prophylaxis: SCDs       Hospital Problems  Date Reviewed: 2018          Codes Class Noted POA    Nausea and vomiting ICD-10-CM: R11.2  ICD-9-CM: 787.01  2022 Yes        Type 2 diabetes mellitus with hyperglycemia ([facility] [address] ICD-10-CM: E11.65  ICD-9-CM: 250.00  2022 Yes        Upper GI bleeding ICD-10-CM: K92.2  ICD-9-CM: 578.9  2022 Yes        Hyponatremia ICD-10-CM: E87.1  ICD-9-CM: 276.1  2018 Unknown                Review of Systems:   A comprehensive review of systems was negative except for that written in the HPI. Vital Signs:    Last 24hrs VS reviewed since prior progress note.  Most recent are:  Visit Vitals  /65   Pulse 65   Temp 97.6 °F (36.4 °C)   Resp 21   Wt 74.8 kg (165 lb)   SpO2 100%   Breastfeeding No   BMI 35.60 kg/m²         Intake/Output Summary (Last 24 hours) at 7/3/2022 0850  Last data filed at 7/3/2022 0600  Gross per 24 hour   Intake 1416.67 ml   Output 2890 ml   Net -1473.33 ml        Physical Examination:             Constitutional:  No acute distress, cooperative, pleasant    ENT:  Oral mucosa moist, oropharynx benign. Resp:  CTA bilaterally. No wheezing/rhonchi/rales. No accessory muscle use   CV:  Regular rhythm, normal rate, no murmurs, gallops, rubs    GI:  Soft, non distended, non tender. normoactive bowel sounds, no hepatosplenomegaly     Musculoskeletal:  No edema, warm, 2+ pulses throughout    Neurologic:  Moves all extremities. AAOx3, CN II-XII reviewed     Psych:  Good insight, Not anxious nor agitated. Data Review:    Review and/or order of clinical lab test      Labs:     Recent Labs     07/03/22  0040 07/02/22  0410   WBC 14.9* 13.1*   HGB 9.5* UNABLE TO OBTAIN RESULT DUE TO HYPONATREMIA   HCT 25.3* 26.1*    334     Recent Labs     07/03/22  0514 07/03/22  0040 07/02/22  2059 07/02/22  1501 07/02/22  1501   * 121* 119*   < > 122*   K 3.6 3.6  --   --  4.0   CL 91* 90*  --   --  91*   CO2 20* 20*  --   --  22   BUN 21* 23*  --   --  16   CREA 0.64 0.82  --   --  0.90   * 232*  --   --  183*   CA 8.0* 8.1*  --   --  8.0*    < > = values in this interval not displayed. No results for input(s): ALT, AP, TBIL, TBILI, TP, ALB, GLOB, GGT, AML, LPSE in the last 72 hours. No lab exists for component: SGOT, GPT, AMYP, HLPSE  No results for input(s): INR, PTP, APTT, INREXT, INREXT in the last 72 hours. No results for input(s): FE, TIBC, PSAT, FERR in the last 72 hours. No results found for: FOL, RBCF   No results for input(s): PH, PCO2, PO2 in the last 72 hours.   No results for input(s): CPK, CKNDX, TROIQ in the last 72 hours.    No lab exists for component: CPKMB  Lab Results   Component Value Date/Time    Cholesterol, total 141 02/09/2018 05:14 AM    HDL Cholesterol 36 02/09/2018 05:14 AM    LDL, calculated 75.8 02/09/2018 05:14 AM    Triglyceride 146 02/09/2018 05:14 AM    CHOL/HDL Ratio 3.9 02/09/2018 05:14 AM     Lab Results   Component Value Date/Time    Glucose (POC) 164 (H) 07/03/2022 06:05 AM    Glucose (POC) 294 (H) 07/02/2022 09:01 PM    Glucose (POC) 179 (H) 07/02/2022 04:21 PM    Glucose (POC) 174 (H) 07/02/2022 11:44 AM    Glucose (POC) 266 (H) 07/02/2022 06:27 AM     Lab Results   Component Value Date/Time    Color PINK 06/29/2022 08:19 PM    Appearance CLEAR 06/29/2022 08:19 PM    Specific gravity 1.013 06/29/2022 08:19 PM    pH (UA) 7.5 06/29/2022 08:19 PM    Protein 100 (A) 06/29/2022 08:19 PM    Glucose Negative 06/29/2022 08:19 PM    Ketone Negative 06/29/2022 08:19 PM    Bilirubin Negative 06/29/2022 08:19 PM    Urobilinogen 0.2 06/29/2022 08:19 PM    Nitrites Negative 06/29/2022 08:19 PM    Leukocyte Esterase Negative 06/29/2022 08:19 PM    Epithelial cells FEW 06/29/2022 08:19 PM    Bacteria Negative 06/29/2022 08:19 PM    WBC 0-4 06/29/2022 08:19 PM    RBC 0-5 06/29/2022 08:19 PM         Medications Reviewed:     Current Facility-Administered Medications   Medication Dose Route Frequency    melatonin tablet 4.5 mg  4.5 mg Oral QHS PRN    glucose chewable tablet 16 g  4 Tablet Oral PRN    glucagon (GLUCAGEN) injection 1 mg  1 mg IntraMUSCular PRN    dextrose 10% infusion 0-250 mL  0-250 mL IntraVENous PRN    simethicone (MYLICON) tablet 80 mg  80 mg Oral QID PRN    heparin (porcine) injection 5,000 Units  5,000 Units SubCUTAneous Q8H    metoclopramide HCl (REGLAN) tablet 10 mg  10 mg Oral AC&HS    sodium chloride (NS) flush 5-40 mL  5-40 mL IntraVENous Q8H    sodium chloride (NS) flush 5-40 mL  5-40 mL IntraVENous Q8H    sodium chloride (NS) flush 5-40 mL  5-40 mL IntraVENous PRN    acetaminophen (TYLENOL) tablet 650 mg  650 mg Oral Q6H PRN    Or    acetaminophen (TYLENOL) suppository 650 mg  650 mg Rectal Q6H PRN    polyethylene glycol (MIRALAX) packet 17 g  17 g Oral DAILY PRN    ondansetron (ZOFRAN ODT) tablet 4 mg  4 mg Oral Q8H PRN    Or    ondansetron (ZOFRAN) injection 4 mg  4 mg IntraVENous Q6H PRN    pantoprazole (PROTONIX) 40 mg in 0.9% sodium chloride 10 mL injection  40 mg IntraVENous Q12H    ALPRAZolam (XANAX) tablet 0.25 mg  0.25 mg Oral QHS PRN    atorvastatin (LIPITOR) tablet 40 mg  40 mg Oral DAILY    [Held by provider] insulin glargine (LANTUS) injection 40 Units  40 Units SubCUTAneous QHS    insulin lispro (HUMALOG) injection   SubCUTAneous AC&HS    amLODIPine/valsartan (EXFORGE) 10/320 mg   Oral DAILY     ______________________________________________________________________  EXPECTED LENGTH OF STAY: 2d 16h  ACTUAL LENGTH OF STAY:          4                 Wendy Paige MD

## 2022-07-03 NOTE — PROGRESS NOTES
Bedside shift change report given to Delores Gill rn (oncoming nurse) by Gretchen Early (offgoing nurse). Report included the following information SBAR, ED Summary, Intake/Output, MAR and Cardiac Rhythm sinus. 2256 - bedside shift report, utilized  to introduce self and establish rapport. Patient reports desire to receive bedbath today    0800 - patient turned/repositioned. Morning assessment. 0830 - patient up to recliner    0900 - morning medications. Patient reporting pelvic/abdominal discomfort to md and asking if further assessments of neck pain will be made. Utilized  to explain scanning bladder (0ml visualized, rodgers draining clear yellow urine), obtaining specimen for urinalysis, morning medications, and that further exploration of neck pain would be likely to take place outpatient as it was not related to the admitting diagnosis. Patient not comfortable with male providing perineal care. Notified charge nurse that assistance with patient would be needed. Dr Michel First contacting nephrology to determine whether rodgers must be removed and replaced to obtain specimen, or if it can be removed and specimen obtained via straight cath. 1000 - encouraged patient to turn self in recliner. 1020 - obtained blood from TLC for na lab    1130 - critical na - 117, md constantino notified, called [facility]    1150 - rodgers removed    1200 - patient turned/repositioned. Reassessment    1300 - spoke with dr Tyronne Snellen, verbal order for 80ml 3% saline at 20ml/hr. Computer not allowing me to enter, notified dr Michel First. 1309 - per pharmacy, nephrologist must enter order or tell pharmacist directly to order the hypertonic saline    1315 - patient complaining of throat and chest tightness, ekg shows normal sinus rhythm, bs 195, hyptertensive (see flowsheet), oxygen 100% on room air.  Dr Nicky Lott notified, saw patient via teledoc, orders for chest xray, morphine, lopressor, troponin, see mar/flowsheet. Prior to my arrival, another nurse assessed pupils and they were both reactive. Dr Raquel Sarmiento at bedside, dr Nicky Perez also presented at bedside    1350 - noted right pupil 3mm brisk reactive, left pupil 4mm nonreactive, code stroke initiated    1415 - at ct for ct with and without contrast, tele neuro in to assess    1439 - at [facility] receiving head mri    1515 - critical sodium 114, md mcgrath and md estevez notified. Drawn prior to beginning 3% saline infusion. Per dr Tita Junior, continue to monitor 3% saline infusion, patient mental status, and sodium labs    1600 - patient passed dysphagia screen with no signs or symptoms of aspiration or difficulty swallowing    1730 - patient in bed smiling and interacting with family, denies pain, reports decreasing numbness, denies chest tightness, reports decreasing throat tightness. 1800 - patient denies need, continues to report decreasing numbness, l pupil remains unreactive to light    Bedside and Verbal shift change report given to Oscar Rizo rn (oncoming nurse) by Ayden Worthy (offgoing nurse). Report included the following information SBAR, ED Summary, Intake/Output and Cardiac Rhythm sinus. Stroke Education provided to patient and relative(s) and the following topics were discussed    1. Patients personal risk factors for stroke are hypertension, diabetes mellitus and obesity    2. Warning signs of Stroke:        * Sudden numbness or weakness of the face, arm or leg, especially on one side of          The body            * Sudden confusion, trouble speaking or understanding        * Sudden trouble seeing in one or both eyes        * Sudden trouble walking, dizziness, loss of balance or coordination        * Sudden severe headache with no known cause      3. Importance of activation Emergency Medical Services ( 9-1-1 ) immediately if experience any warning signs of stroke.     4. Be sure and schedule a follow-up appointment with your primary care doctor or any specialists as instructed. 5. You must take medicine every day to treat your risk factors for stroke. Be sure to take your medicines exactly as your doctor tells you: no more, no less. Know what your medicines are for , what they do. Anti-thrombotics /anticoagulants can help prevent strokes. You are taking the following medicine(s) heparin, atorvastatin     6. Smoking and second-hand smoke greatly increase your risk of stroke, cardiovascular disease and death. 7. Information provided was Verbal Education    8. Documentation of teaching completed in Patient Education Activity and on Care Plan with teaching response noted? No    See flowsheet for 1400 nihss/neuro check, 1500, 1600, 1700, 1800 neuro checks.  Dysphagia screen

## 2022-07-03 NOTE — PROGRESS NOTES
Problem: Mobility Impaired (Adult and Pediatric)  Goal: *Acute Goals and Plan of Care (Insert Text)  Description: FUNCTIONAL STATUS PRIOR TO ADMISSION: Patient was independent and active without use of DME.    HOME SUPPORT PRIOR TO ADMISSION: The patient lived with her daughter but did not require assist.    Physical Therapy Goals  Initiated 7/3/2022  1. Patient will move from supine to sit and sit to supine  in bed with independence within 7 day(s). 2.  Patient will transfer from bed to chair and chair to bed with independence using the least restrictive device within 7 day(s). 3.  Patient will perform sit to stand with independence within 7 day(s). 4.  Patient will ambulate with independence for 150 feet with the least restrictive device within 7 day(s). 5.  Patient will ascend/descend 2 stairs with 1 handrail(s) with supervision/set-up within 7 day(s). 7/3/2022 1256 by Katalnia Ceja  Outcome: Progressing Towards Goal      PHYSICAL THERAPY EVALUATION  Patient: Hamilton Barraza (67 y.o. female)  Date: 7/3/2022  Primary Diagnosis: Hyponatremia [E87.1]  Type 2 diabetes mellitus with hyperglycemia ([facility] [address] [E11.65]  Nausea and vomiting [R11.2]        Precautions: Fall, Divehi speaking only       ASSESSMENT  Based on the objective data described below, the patient presents with decreased activity tolerance, generalized weakness and presents below her functional baseline, admitted to the acute setting for management of hyponatremia. Video  used for evaluation. Pt reports that prior to admission she was independent, living with her daughter. Currently, pt is requiring Min A overall for mobility but appears motivate to mobilize and gain independence. Acute PT will continue to follow pt while she remains in the acute setting. Rec home with family to assist and possible HHPT pending progress during hospitalization.  Encouraged pt to call for RN assist and to ambulate to the restroom for all toileting needs and to get up to chair with RN assist for all meals. Current Level of Function Impacting Discharge (mobility/balance): Min A    Functional Outcome Measure: The patient scored 60/100 on the Barthel Index outcome measure which is indicative of 40% functional impairment. Other factors to consider for discharge:      Patient will benefit from skilled therapy intervention to address the above noted impairments. PLAN :  Recommendations and Planned Interventions: bed mobility training, transfer training, gait training, therapeutic exercises, patient and family training/education, and therapeutic activities      Frequency/Duration: Patient will be followed by physical therapy:  5 times a week to address goals. Recommendation for discharge: (in order for the patient to meet his/her long term goals)  Physical therapy at least 2 days/week in the home AND ensure assist and/or supervision for safety with mobility    This discharge recommendation:  A follow-up discussion with the attending provider and/or case management is planned    IF patient discharges home will need the following DME: to be determined (TBD)         SUBJECTIVE:   Patient stated I am tired.     OBJECTIVE DATA SUMMARY:   HISTORY:    Past Medical History:   Diagnosis Date    Diabetes ([address]     GERD (gastroesophageal reflux disease)     Hypertension      Past Surgical History:   Procedure Laterality Date    HX BREAST LUMPECTOMY Left     IR INSERT NON TUNL CVC OVER 5 YRS  7/1/2022       Personal factors and/or comorbidities impacting plan of care:     Home Situation  Home Environment: Private residence  # Steps to Enter: 2  Rails to Enter: Yes  One/Two Story Residence: One story  Living Alone: No  Support Systems: Child(kleber)  Patient Expects to be Discharged to[de-identified] Home  Current DME Used/Available at Home: None    EXAMINATION/PRESENTATION/DECISION MAKING:   Critical Behavior:  Neurologic State: Alert  Orientation Level: Oriented X4  Cognition: Follows commands     Hearing: Auditory  Auditory Impairment: None  Skin:  Defer to RN notes  Edema: Defer to RN notes  Range Of Motion:  AROM: Generally decreased, functional           PROM: Generally decreased, functional           Strength:    Strength: Generally decreased, functional                    Tone & Sensation:   Tone: Normal              Sensation: Intact               Coordination:  Coordination: Within functional limits  Vision:      Functional Mobility:  Bed Mobility:     Supine to Sit: Minimum assistance  Sit to Supine: Minimum assistance  Scooting: Contact guard assistance  Transfers:  Sit to Stand: Minimum assistance  Stand to Sit: Minimum assistance                       Balance:   Sitting: Intact  Standing: Impaired  Standing - Static: Fair  Standing - Dynamic : Fair  Ambulation/Gait Training:  Distance (ft): 45 Feet (ft)  Assistive Device: Gait belt  Ambulation - Level of Assistance: Minimal assistance     Gait Description (WDL): Exceptions to WDL  Gait Abnormalities: Decreased step clearance;Trunk sway increased        Base of Support: Widened     Speed/Juju: Slow;Shuffled  Step Length: Right shortened;Left shortened      Functional Measure:  Barthel Index:    Bathin  Bladder: 10  Bowels: 10  Groomin  Dressin  Feeding: 10  Mobility: 0  Stairs: 5  Toilet Use: 5  Transfer (Bed to Chair and Back): 10  Total: 60/100       The Barthel ADL Index: Guidelines  1. The index should be used as a record of what a patient does, not as a record of what a patient could do. 2. The main aim is to establish degree of independence from any help, physical or verbal, however minor and for whatever reason. 3. The need for supervision renders the patient not independent. 4. A patient's performance should be established using the best available evidence. Asking the patient, friends/relatives and nurses are the usual sources, but direct observation and common sense are also important. However direct testing is not needed. 5. Usually the patient's performance over the preceding 24-48 hours is important, but occasionally longer periods will be relevant. 6. Middle categories imply that the patient supplies over 50 per cent of the effort. 7. Use of aids to be independent is allowed. Score Interpretation (from [address] [facility])    Independent   60-79 Minimally independent   40-59 Partially dependent   20-39 Very dependent   <20 Totally dependent     -Catie Russell., Barthel, DIZA. (1965). Functional evaluation: the Barthel Index. [address] ([address] (1997). The Barthel activities of daily living index: self-reporting versus actual performance in the old (> or = 75 years). Journal [address](7), [facility], J.J.M.F, Paty Wynn., Mabel Felty. (1999). Measuring the change in disability after inpatient rehabilitation; comparison of the responsiveness of the Barthel Index and Functional Spencer Measure. Journal of Neurology, Neurosurgery, and Psychiatry, 66(4), 136-409. Chaka Chaidez, N.J.A, NEPTALI DoyleJ.JAVIER, & Harsha Gloria, M.A. (2004) Assessment of post-stroke quality of life in cost-effectiveness studies: The usefulness of the Barthel Index and the EuroQoL-5D.  Quality of Life Research, 15, 570-28           Physical Therapy Evaluation Charge Determination   History Examination Presentation Decision-Making   MEDIUM  Complexity : 1-2 comorbidities / personal factors will impact the outcome/ POC  LOW Complexity : 1-2 Standardized tests and measures addressing body structure, function, activity limitation and / or participation in recreation  LOW Complexity : Stable, uncomplicated  LOW Complexity : FOTO score of       Based on the above components, the patient evaluation is determined to be of the following complexity level: LOW     Pain Rating:  Denied pain    Activity Tolerance:   Good and requires rest breaks    After treatment patient left in no apparent distress:   Supine in bed, Bed / chair alarm activated, and Caregiver / family present    COMMUNICATION/EDUCATION:   The patients plan of care was discussed with: Registered nurse. Fall prevention education was provided and the patient/caregiver indicated understanding. and Patient/family have participated as able in goal setting and plan of care.     Thank you for this referral.  Carter Castleman PT, DPT   Time Calculation: 25 mins

## 2022-07-03 NOTE — PROGRESS NOTES
Overnight Nephrology Cross Cover  6p-6a    Repeat sodium from 0040 draw  185<552<395  S/p desmopressin and D5W last evening  Goal sodium 120 by am  Will give small dose of D5W  Continue to trend sodium   UOP since 2000 350 ml    Federico Shine, [facility] resolved

## 2022-07-03 NOTE — PROGRESS NOTES
Justice P[address]Y 23.  YOB: 1960          Assessment & Plan:   Hypo-osmolar hyponatremia d/t SIADH in the setting of N/V and abdominal pain, h/o thiazide diuretics  HTN  DM-2  CP     Plan :  NA NOW DOWN   DESPITE NEGATIVE FLUID BALANCE  3% SALINE 20 ML/HR X 4 HOURS = TOTAL = 80 ML  IF UO > 150 ML/HR AT ANY TIME DURING INFUSION STOP INFUSION  GETTING WORKED UP FOR CP NOW WITH ICU TEAM       Subjective:   CC: HYPONATREMIA   HPI: Patient seen and examined. Having CP. BP is high. Daughter is translating. EKG being done. ROS: No HA.    Current Facility-Administered Medications   Medication Dose Route Frequency    melatonin tablet 4.5 mg  4.5 mg Oral QHS PRN    glucose chewable tablet 16 g  4 Tablet Oral PRN    glucagon (GLUCAGEN) injection 1 mg  1 mg IntraMUSCular PRN    dextrose 10% infusion 0-250 mL  0-250 mL IntraVENous PRN    simethicone (MYLICON) tablet 80 mg  80 mg Oral QID PRN    heparin (porcine) injection 5,000 Units  5,000 Units SubCUTAneous Q8H    metoclopramide HCl (REGLAN) tablet 10 mg  10 mg Oral AC&HS    sodium chloride (NS) flush 5-40 mL  5-40 mL IntraVENous Q8H    sodium chloride (NS) flush 5-40 mL  5-40 mL IntraVENous Q8H    sodium chloride (NS) flush 5-40 mL  5-40 mL IntraVENous PRN    acetaminophen (TYLENOL) tablet 650 mg  650 mg Oral Q6H PRN    Or    acetaminophen (TYLENOL) suppository 650 mg  650 mg Rectal Q6H PRN    polyethylene glycol (MIRALAX) packet 17 g  17 g Oral DAILY PRN    ondansetron (ZOFRAN ODT) tablet 4 mg  4 mg Oral Q8H PRN    Or    ondansetron (ZOFRAN) injection 4 mg  4 mg IntraVENous Q6H PRN    pantoprazole (PROTONIX) 40 mg in 0.9% sodium chloride 10 mL injection  40 mg IntraVENous Q12H    ALPRAZolam (XANAX) tablet 0.25 mg  0.25 mg Oral QHS PRN    atorvastatin (LIPITOR) tablet 40 mg  40 mg Oral DAILY    [Held by provider] insulin glargine (LANTUS) injection 40 Units  40 Units SubCUTAneous QHS    insulin lispro (HUMALOG) injection   SubCUTAneous AC&HS    amLODIPine/valsartan (EXFORGE) 10/320 mg   Oral DAILY          Objective:     Vitals:  Blood pressure 133/72, pulse 98, temperature 97.6 °F (36.4 °C), resp. rate 21, weight 74.8 kg (165 lb), SpO2 100 %, not currently breastfeeding. Temp (24hrs), Av.9 °F (36.6 °C), Min:97.6 °F (36.4 °C), Max:98.2 °F (36.8 °C)      Intake and Output:  701 - 1900  In: -   Out: 1100 [Urine:1100]  1901 -  0700  In: 1861.3 [P.O.:1490; I.V.:371.3]  Out: 4265 [Urine:4265]    Physical Exam:                Patient is intubated:  no    Physical Examination:   GENERAL ASSESSMENT: NAD  HEENT:Nontraumatic   CHEST: CTA  HEART: S1S2  ABDOMEN: Soft,NT,  :[place]:   EXTREMITY: EDEMA - no  NEURO:Grossly non focal          ECG/rhythm:    Data Review      No results for input(s): TNIPOC in the last 72 hours. No lab exists for component: ITNL   No results for input(s): CPK, CKMB, TROIQ in the last 72 hours. Recent Labs     22  1022 22  0514 22  0040 22  1203 22  0410   * 119* 121*   < > 112*   K 3.9 3.6 3.6   < > 3.6   CL 90* 91* 90*   < > 83*   CO2 19* 20* 20*   < > 18*   BUN 23* 21* 23*   < > 16   CREA 0.60 0.64 0.82   < > 0.82   * 164* 232*   < > 289*   CA 7.9* 8.0* 8.1*   < > 7.6*   WBC  --   --  14.9*  --  13.1*   HGB  --   --  9.5*  --  UNABLE TO OBTAIN RESULT DUE TO HYPONATREMIA   HCT  --   --  25.3*  --  26.1*   PLT  --   --  367  --  334    < > = values in this interval not displayed. No results for input(s): INR, PTP, APTT, INREXT in the last 72 hours.   Needs: urine analysis, urine sodium, protein and creatinine  Lab Results   Component Value Date/Time    Sodium,urine random 125 2022 09:04 AM    Creatinine, urine <13.00 2022 10:37 PM         Discussed with:  Pt, Daughter, Nursing     : Sony Blank MD  7/3/2022        [facility]:  [email or web address]  [place] office:  [address] [place] [address], [address] [place] [address],[address]  [place], [address] [place] [address]  Phone: [phone]  Fax :     [phone]    [place] office:  [address] [facility], [facility] [address]  Phone - [phone]  Fax - [phone]

## 2022-07-03 NOTE — PROGRESS NOTES
Progress Note  [facility]:       Room:[address] [place] [address]  Patient Edna Pittman     YOB: 1960     Age:62 y.o. Subjective    Subjective s/p 3 percent; improved Na; ongoing abdominal discomfort - ?she thinks related to rodgers  Review of Systems n o fever, chills, n/v; lower abdominal pain ?nature; remainder ros negatuve after review of chart  Objective         Vitals Last 24 Hours:  TEMPERATURE:  Temp  Av.9 °F (36.6 °C)  Min: 97.6 °F (36.4 °C)  Max: 98.2 °F (36.8 °C)  RESPIRATIONS RANGE: Resp  Av.3  Min: 12  Max: 28  PULSE OXIMETRY RANGE: SpO2  Av.2 %  Min: 91 %  Max: 100 %  PULSE RANGE: Pulse  Av.7  Min: 60  Max: 94  BLOOD PRESSURE RANGE: Systolic (26XCB), WTF:620 , Min:88 , WQA:294   ; Diastolic (35YQT), HOK:38, Min:40, Max:96    I/O (24Hr):     Intake/Output Summary (Last 24 hours) at 7/3/2022 1019  Last data filed at 7/3/2022 0600  Gross per 24 hour   Intake 1416.67 ml   Output 2590 ml   Net -1173.33 ml     Objective   Exam facilitated by use of telemedicine platform and with assistance from in house team  Gen - Awake and interactive; no acute distress; full sentences  Head - nc/at  Ent - normal external anatomy  Cvs - sinus rhythm without external evidence of hypoperfusion  Pulm - normal WOB and adequate SaO2 on RA  GI-no evidence of tenderness with passive movement  Msk - normal bulk  Neuro - oriented, no deficits in strength noted; no tremor; follows commands  Labs/Imaging/Diagnostics    Labs:  CBC:  Recent Labs     22  0040 22  0410   WBC 14.9* 13.1*   RBC 3.20* 3.39*   HGB 9.5* UNABLE TO OBTAIN RESULT DUE TO HYPONATREMIA   HCT 25.3* 26.1*   MCV 79.1* 77.0*   RDW 11.5 11.2*    334     CHEMISTRIES:  Recent Labs     22  0514 22  0040 22  1501 22  1501   * 121* 119*   < > 122*   K 3.6 3.6  --   --  4.0   CL 91* 90*  --   --  91*   CO2 20* 20*  --   --  22   BUN 21* 23*  --   --  16   CA 8. 0* 8.1*  --   --  8.0*    < > = values in this interval not displayed. PT/INR:No results for input(s): INR, INREXT in the last 72 hours. No lab exists for component: PROTIME  APTT:No results for input(s): APTT in the last 72 hours. LIVER PROFILE:No results for input(s): AST, ALT in the last 72 hours. No lab exists for component: [place], ALKPHOS  Lab Results   Component Value Date/Time    ALT (SGPT) 28 06/29/2022 05:50 PM    AST (SGOT) 27 06/29/2022 05:50 PM    Alk. phosphatase 54 06/29/2022 05:50 PM    Bilirubin, total 0.6 06/29/2022 05:50 PM       Imaging Last 24 Hours:  MRI CERV SPINE WO CONT    Result Date: 7/2/2022  INDICATION:  Neck pain, numbness COMPARISON: None TECHNIQUE: Multiplanar multisequence acquisition of the cervical spine. CONTRAST: None. FINDINGS: ALIGNMENT:  Straightening. VERTEBRAL BODIES:  No compression fracture. MARROW SIGNAL: No significant edema. SPINAL CORD:  Normal course, caliber, and signal intensity. ADDITIONAL COMMENTS: N/A. C2/3:   The spinal canal and foramina are widely patent. C3/4:  Spinal canal and left foramen are patent. Minimal if any right foraminal stenosis due to uncovertebral spurring. C4/5:  Mild uncovertebral spurring and minimal disc bulge. No significant spinal canal or foraminal stenosis. C5/6:  Mild diffuse disc bulge, without significant spinal canal or foraminal stenosis. C6/7:   The spinal canal and foramina are widely patent. C7/T1:   The spinal canal and foramina are widely patent. Very mild degenerative changes in the cervical spine, without significant spinal canal or foraminal stenosis at any level.      Assessment//Plan   Active Problems:    Hyponatremia (2/7/2018)      Nausea and vomiting (6/29/2022)      Type 2 diabetes mellitus with hyperglycemia ([facility] [address] (6/29/2022)      Upper GI bleeding (6/29/2022)      Assessment & Plan  62F with past medical history of type 2 diabetes mellitus, Hypertension, GERD and chronic neck pain who [facility] the [facility] 6[facility] with abdominal pain, nausea and vomiting. Evaluation showed a sodium level of 114 and was started on 3% saline and admitted to ICU for further management. Periodic hypoglycemia improved with increased intake.   Sodium slowly improving.     Neuro - pain controlled and CAM negative  -family reported ongoing neck pain from home; no focal deficits and variable types/radiation reported; MRI demonstrated very mild degenerative changes in the cervical spine, without significant spinal canal or foraminal stenosis at any level    Cvs - acceptable hemodynamics without evidence of hypoperfusion or ischemia  -antihypertensives previously adjusted and will follow MAPs     Pulm - adequate oxygenation and normal WOB     GI - improved abdominal sx and tolerating po  - initial N and V likely from hyponatremia and to lesser extent, gastroparesis  -h/o GERD and can continue home meds   -although previously reported, patient denied any coffee-ground emesis and there has been no evidence of hemorrhage        - required additional 3% overnight; hyponatremia likely related to SIADH; defer to nephro regarding subsequent dosing  - normal appearing output and with lower abdominal sx, will d/c rodgers and monitor; if she has urinary retention a rodgers can be re-placed     Heme-on dvt proph without evidence of hemorrhage  -No acute need for transfusion of blood products     ID - No evidence of an acute infection     Endo -  h/o DM and on long acting insulin - held given poor intake; but improved ranges with return of appetite and may need to resume tonight, albeit at a lower dose (will follow accu check results today to determine dose for tonight)      FEN - hyponatremia as above        CCT 35 minutes excluding teaching and procedures     I performed all aspects of the physical examination via Telemedicine associated with two way audio and video communication and with the on-site assistance of the bedside nurse. Seble Chavez am located in [place] and the patient is located in [place] at [address] [place] [address]   The patient is critically ill in the ICU. Jono Khan  reviewed the pertinent medical records, laboratory/ pathology data and radiographic images.  The decision making regarding this patient is as documented above, which was generated  following  discussion  with the multidisciplinary ICU team and creation of a treatment plan for  the patient. We discussed the patient's interval history and future coordination of care and Clark Ramirez patient's medications  were reviewed and changes made as stipulated above.  Due to  critical illness impairing one or more vital organs of this patient resulting in life threatening clinical situation  I have provided direct, frequent personal  assessment and manipulation in management plan.   Electronically signed by Allyson Gilliam MD on 7/3/2022 at 10:19 AM

## 2022-07-04 LAB
ANION GAP SERPL CALC-SCNC: 10 MMOL/L (ref 5–15)
ANION GAP SERPL CALC-SCNC: 11 MMOL/L (ref 5–15)
ANION GAP SERPL CALC-SCNC: 11 MMOL/L (ref 5–15)
ANION GAP SERPL CALC-SCNC: 8 MMOL/L (ref 5–15)
ANION GAP SERPL CALC-SCNC: 9 MMOL/L (ref 5–15)
ANION GAP SERPL CALC-SCNC: 9 MMOL/L (ref 5–15)
APPEARANCE UR: CLEAR
BACTERIA URNS QL MICRO: NEGATIVE /HPF
BASOPHILS # BLD: 0 K/UL (ref 0–0.1)
BASOPHILS NFR BLD: 0 % (ref 0–1)
BILIRUB UR QL: NEGATIVE
BUN SERPL-MCNC: 16 MG/DL (ref 6–20)
BUN SERPL-MCNC: 17 MG/DL (ref 6–20)
BUN SERPL-MCNC: 19 MG/DL (ref 6–20)
BUN SERPL-MCNC: 20 MG/DL (ref 6–20)
BUN/CREAT SERPL: 19 (ref 12–20)
BUN/CREAT SERPL: 22 (ref 12–20)
BUN/CREAT SERPL: 22 (ref 12–20)
BUN/CREAT SERPL: 23 (ref 12–20)
BUN/CREAT SERPL: 23 (ref 12–20)
BUN/CREAT SERPL: 26 (ref 12–20)
CALCIUM SERPL-MCNC: 8.2 MG/DL (ref 8.5–10.1)
CALCIUM SERPL-MCNC: 8.3 MG/DL (ref 8.5–10.1)
CALCIUM SERPL-MCNC: 8.4 MG/DL (ref 8.5–10.1)
CALCIUM SERPL-MCNC: 8.6 MG/DL (ref 8.5–10.1)
CALCIUM SERPL-MCNC: 8.7 MG/DL (ref 8.5–10.1)
CALCIUM SERPL-MCNC: 8.8 MG/DL (ref 8.5–10.1)
CHLORIDE SERPL-SCNC: 91 MMOL/L (ref 97–108)
CHLORIDE SERPL-SCNC: 91 MMOL/L (ref 97–108)
CHLORIDE SERPL-SCNC: 92 MMOL/L (ref 97–108)
CHLORIDE SERPL-SCNC: 94 MMOL/L (ref 97–108)
CHLORIDE SERPL-SCNC: 94 MMOL/L (ref 97–108)
CHLORIDE SERPL-SCNC: 96 MMOL/L (ref 97–108)
CO2 SERPL-SCNC: 19 MMOL/L (ref 21–32)
CO2 SERPL-SCNC: 21 MMOL/L (ref 21–32)
CO2 SERPL-SCNC: 22 MMOL/L (ref 21–32)
CO2 SERPL-SCNC: 22 MMOL/L (ref 21–32)
COLOR UR: ABNORMAL
COMMENT, HOLDF: NORMAL
COMMENT, HOLDF: NORMAL
CREAT SERPL-MCNC: 0.74 MG/DL (ref 0.55–1.02)
CREAT SERPL-MCNC: 0.77 MG/DL (ref 0.55–1.02)
CREAT SERPL-MCNC: 0.79 MG/DL (ref 0.55–1.02)
CREAT SERPL-MCNC: 0.79 MG/DL (ref 0.55–1.02)
CREAT SERPL-MCNC: 0.83 MG/DL (ref 0.55–1.02)
CREAT SERPL-MCNC: 0.84 MG/DL (ref 0.55–1.02)
DIFFERENTIAL METHOD BLD: ABNORMAL
EOSINOPHIL # BLD: 0.2 K/UL (ref 0–0.4)
EOSINOPHIL NFR BLD: 1 % (ref 0–7)
EPITH CASTS URNS QL MICRO: ABNORMAL /LPF
ERYTHROCYTE [DISTWIDTH] IN BLOOD BY AUTOMATED COUNT: 11.5 % (ref 11.5–14.5)
GLUCOSE BLD STRIP.AUTO-MCNC: 142 MG/DL (ref 65–117)
GLUCOSE BLD STRIP.AUTO-MCNC: 171 MG/DL (ref 65–117)
GLUCOSE BLD STRIP.AUTO-MCNC: 184 MG/DL (ref 65–117)
GLUCOSE BLD STRIP.AUTO-MCNC: 221 MG/DL (ref 65–117)
GLUCOSE SERPL-MCNC: 137 MG/DL (ref 65–100)
GLUCOSE SERPL-MCNC: 138 MG/DL (ref 65–100)
GLUCOSE SERPL-MCNC: 172 MG/DL (ref 65–100)
GLUCOSE SERPL-MCNC: 178 MG/DL (ref 65–100)
GLUCOSE SERPL-MCNC: 190 MG/DL (ref 65–100)
GLUCOSE SERPL-MCNC: 215 MG/DL (ref 65–100)
GLUCOSE UR STRIP.AUTO-MCNC: NEGATIVE MG/DL
HCT VFR BLD AUTO: 25.4 % (ref 35–47)
HGB BLD-MCNC: 9.5 G/DL (ref 11.5–16)
HGB UR QL STRIP: ABNORMAL
HYALINE CASTS URNS QL MICRO: ABNORMAL /LPF (ref 0–2)
IMM GRANULOCYTES # BLD AUTO: 0.2 K/UL (ref 0–0.04)
IMM GRANULOCYTES NFR BLD AUTO: 1 % (ref 0–0.5)
KETONES UR QL STRIP.AUTO: NEGATIVE MG/DL
LEUKOCYTE ESTERASE UR QL STRIP.AUTO: ABNORMAL
LYMPHOCYTES # BLD: 6.2 K/UL (ref 0.8–3.5)
LYMPHOCYTES NFR BLD: 39 % (ref 12–49)
MCH RBC QN AUTO: 29.6 PG (ref 26–34)
MCHC RBC AUTO-ENTMCNC: 37.4 G/DL (ref 30–36.5)
MCV RBC AUTO: 79.1 FL (ref 80–99)
MONOCYTES # BLD: 1.1 K/UL (ref 0–1)
MONOCYTES NFR BLD: 7 % (ref 5–13)
NEUTS SEG # BLD: 8.3 K/UL (ref 1.8–8)
NEUTS SEG NFR BLD: 52 % (ref 32–75)
NITRITE UR QL STRIP.AUTO: NEGATIVE
NRBC # BLD: 0 K/UL (ref 0–0.01)
NRBC BLD-RTO: 0 PER 100 WBC
OSMOLALITY UR: 369 MOSM/KG H2O
PH UR STRIP: 7.5 [PH] (ref 5–8)
PLATELET # BLD AUTO: 342 K/UL (ref 150–400)
PMV BLD AUTO: 8.4 FL (ref 8.9–12.9)
POTASSIUM SERPL-SCNC: 3.8 MMOL/L (ref 3.5–5.1)
POTASSIUM SERPL-SCNC: 3.9 MMOL/L (ref 3.5–5.1)
POTASSIUM SERPL-SCNC: 4.1 MMOL/L (ref 3.5–5.1)
POTASSIUM SERPL-SCNC: 4.4 MMOL/L (ref 3.5–5.1)
POTASSIUM SERPL-SCNC: 4.6 MMOL/L (ref 3.5–5.1)
POTASSIUM SERPL-SCNC: 4.6 MMOL/L (ref 3.5–5.1)
PROT UR STRIP-MCNC: 100 MG/DL
RBC # BLD AUTO: 3.21 M/UL (ref 3.8–5.2)
RBC #/AREA URNS HPF: >100 /HPF (ref 0–5)
RBC MORPH BLD: ABNORMAL
SAMPLES BEING HELD,HOLD: NORMAL
SAMPLES BEING HELD,HOLD: NORMAL
SERVICE CMNT-IMP: ABNORMAL
SODIUM SERPL-SCNC: 121 MMOL/L (ref 136–145)
SODIUM SERPL-SCNC: 122 MMOL/L (ref 136–145)
SODIUM SERPL-SCNC: 123 MMOL/L (ref 136–145)
SODIUM SERPL-SCNC: 125 MMOL/L (ref 136–145)
SODIUM SERPL-SCNC: 125 MMOL/L (ref 136–145)
SODIUM SERPL-SCNC: 126 MMOL/L (ref 136–145)
SODIUM UR-SCNC: 39 MMOL/L
SP GR UR REFRACTOMETRY: 1.02 (ref 1–1.03)
UROBILINOGEN UR QL STRIP.AUTO: 1 EU/DL (ref 0.2–1)
WBC # BLD AUTO: 16 K/UL (ref 3.6–11)
WBC URNS QL MICRO: ABNORMAL /HPF (ref 0–4)

## 2022-07-04 PROCEDURE — 74011250637 HC RX REV CODE- 250/637: Performed by: FAMILY MEDICINE

## 2022-07-04 PROCEDURE — 80048 BASIC METABOLIC PNL TOTAL CA: CPT

## 2022-07-04 PROCEDURE — 74011250636 HC RX REV CODE- 250/636: Performed by: NURSE PRACTITIONER

## 2022-07-04 PROCEDURE — 84300 ASSAY OF URINE SODIUM: CPT

## 2022-07-04 PROCEDURE — 74011000250 HC RX REV CODE- 250: Performed by: INTERNAL MEDICINE

## 2022-07-04 PROCEDURE — 74011250636 HC RX REV CODE- 250/636: Performed by: HOSPITALIST

## 2022-07-04 PROCEDURE — 74011250636 HC RX REV CODE- 250/636: Performed by: INTERNAL MEDICINE

## 2022-07-04 PROCEDURE — 36415 COLL VENOUS BLD VENIPUNCTURE: CPT

## 2022-07-04 PROCEDURE — 77030029065 HC DRSG HEMO QCLOT ZMED -B

## 2022-07-04 PROCEDURE — 74011250637 HC RX REV CODE- 250/637: Performed by: HOSPITALIST

## 2022-07-04 PROCEDURE — 83935 ASSAY OF URINE OSMOLALITY: CPT

## 2022-07-04 PROCEDURE — 81001 URINALYSIS AUTO W/SCOPE: CPT

## 2022-07-04 PROCEDURE — 74011636637 HC RX REV CODE- 636/637: Performed by: INTERNAL MEDICINE

## 2022-07-04 PROCEDURE — 87086 URINE CULTURE/COLONY COUNT: CPT

## 2022-07-04 PROCEDURE — 74011000250 HC RX REV CODE- 250: Performed by: HOSPITALIST

## 2022-07-04 PROCEDURE — 82962 GLUCOSE BLOOD TEST: CPT

## 2022-07-04 PROCEDURE — 74011000250 HC RX REV CODE- 250: Performed by: NURSE PRACTITIONER

## 2022-07-04 PROCEDURE — 85025 COMPLETE CBC W/AUTO DIFF WBC: CPT

## 2022-07-04 PROCEDURE — 65270000046 HC RM TELEMETRY

## 2022-07-04 PROCEDURE — 74011250637 HC RX REV CODE- 250/637: Performed by: INTERNAL MEDICINE

## 2022-07-04 RX ORDER — TRAMADOL HYDROCHLORIDE 50 MG/1
50 TABLET ORAL
Status: COMPLETED | OUTPATIENT
Start: 2022-07-04 | End: 2022-07-05

## 2022-07-04 RX ORDER — 3% SODIUM CHLORIDE 3 G/100ML
25 INJECTION, SOLUTION INTRAVENOUS CONTINUOUS
Status: DISPENSED | OUTPATIENT
Start: 2022-07-04 | End: 2022-07-04

## 2022-07-04 RX ORDER — SODIUM CHLORIDE 0.9 % (FLUSH) 0.9 %
5-40 SYRINGE (ML) INJECTION AS NEEDED
Status: DISCONTINUED | OUTPATIENT
Start: 2022-07-04 | End: 2022-07-07 | Stop reason: HOSPADM

## 2022-07-04 RX ADMIN — HEPARIN SODIUM 5000 UNITS: 5000 INJECTION INTRAVENOUS; SUBCUTANEOUS at 02:00

## 2022-07-04 RX ADMIN — Medication 2 UNITS: at 22:00

## 2022-07-04 RX ADMIN — PANTOPRAZOLE SODIUM 40 MG: 40 TABLET, DELAYED RELEASE ORAL at 16:31

## 2022-07-04 RX ADMIN — METOCLOPRAMIDE 10 MG: 10 TABLET ORAL at 22:13

## 2022-07-04 RX ADMIN — METOCLOPRAMIDE 10 MG: 10 TABLET ORAL at 16:31

## 2022-07-04 RX ADMIN — TRAMADOL HYDROCHLORIDE 50 MG: 50 TABLET ORAL at 04:17

## 2022-07-04 RX ADMIN — METOCLOPRAMIDE 10 MG: 10 TABLET ORAL at 11:23

## 2022-07-04 RX ADMIN — HEPARIN SODIUM 5000 UNITS: 5000 INJECTION INTRAVENOUS; SUBCUTANEOUS at 08:40

## 2022-07-04 RX ADMIN — METOCLOPRAMIDE 10 MG: 10 TABLET ORAL at 07:43

## 2022-07-04 RX ADMIN — ACETAMINOPHEN 650 MG: 325 TABLET ORAL at 08:37

## 2022-07-04 RX ADMIN — SODIUM CHLORIDE, PRESERVATIVE FREE 10 ML: 5 INJECTION INTRAVENOUS at 22:30

## 2022-07-04 RX ADMIN — PANTOPRAZOLE SODIUM 40 MG: 40 TABLET, DELAYED RELEASE ORAL at 07:43

## 2022-07-04 RX ADMIN — SODIUM CHLORIDE 25 ML/HR: 3 INJECTION, SOLUTION INTRAVENOUS at 22:05

## 2022-07-04 RX ADMIN — Medication 2 UNITS: at 16:30

## 2022-07-04 RX ADMIN — Medication 2 UNITS: at 07:43

## 2022-07-04 RX ADMIN — CEFTRIAXONE 1 G: 1 INJECTION, POWDER, FOR SOLUTION INTRAMUSCULAR; INTRAVENOUS at 16:31

## 2022-07-04 RX ADMIN — SODIUM CHLORIDE, PRESERVATIVE FREE 10 ML: 5 INJECTION INTRAVENOUS at 13:05

## 2022-07-04 RX ADMIN — SODIUM CHLORIDE 25 ML/HR: 3 INJECTION, SOLUTION INTRAVENOUS at 11:43

## 2022-07-04 RX ADMIN — ATORVASTATIN CALCIUM 40 MG: 20 TABLET, FILM COATED ORAL at 08:39

## 2022-07-04 RX ADMIN — Medication 10 ML: at 05:44

## 2022-07-04 RX ADMIN — SODIUM CHLORIDE, PRESERVATIVE FREE 10 ML: 5 INJECTION INTRAVENOUS at 05:44

## 2022-07-04 RX ADMIN — Medication 2 UNITS: at 11:23

## 2022-07-04 NOTE — PROGRESS NOTES
Problem: Falls - Risk of  Goal: *Absence of Falls  Description: Document Leafy Evangelista Fall Risk and appropriate interventions in the flowsheet. Outcome: Progressing Towards Goal  Note: Fall Risk Interventions:  Mobility Interventions: Assess mobility with egress test,OT consult for ADLs,PT Consult for mobility concerns,PT Consult for assist device competence,Strengthening exercises (ROM-active/passive)         Medication Interventions: Assess postural VS orthostatic hypotension,Evaluate medications/consider consulting pharmacy,Patient to call before getting OOB,Teach patient to arise slowly    Elimination Interventions: Bed/chair exit alarm,Call light in reach,Patient to call for help with toileting needs       Problem: Diabetes Self-Management  Goal: *Disease process and treatment process  Description: Define diabetes and identify own type of diabetes; list 3 options for treating diabetes.   Outcome: Progressing Towards Goal     Problem: Patient Education: Go to Patient Education Activity  Goal: Patient/Family Education  Outcome: Progressing Towards Goal

## 2022-07-04 NOTE — PROGRESS NOTES
Sohail Unique [facility] 79  [address] [facility], [facility], [address] [facility]  [phone] [address] Adult  Hospitalist Group                                                                                          Hospitalist Progress Note  Shikha Stewart MD        Date of Service:  2022  NAME:  Joe Vigil  :  1960  MRN:  [id number]      Admission Summary:   58 to female admitted with hyponatremia    Interval history / Subjective: [place] out Code Neuro was called yesterday because of Pupil size. She is awake and alert no new issues I spoke with RN     Assessment & Plan:     Hyponatremia (2018)  -likely due to chronic hctz use and SIADH  Getting 3 percent plan per Nephrology  -s/p 3% ns and DDAVP  -nephrology following     Upper GI bleeding (2022)  -patient reports this is not correct,  misinterpreted what she was saying.  -cont regular diet, cancel GI consult     Nausea and vomiting/dyspepsia (2022)  -likely from hyponatremia, gastroparesis  -now tolerating diet  -Protonix IV, scheduled reglan PO  -prn simethicone  -has a history of H. Pylori, so will check serum h. Pylori abs     Type 2 diabetes mellitus with hyperglycemia (HCC) (2022)  -A1C 7.9  -Monitor blood sugar  -Correctional insulin  -Resume her Lantus 40 units daily at home    HTN  -cont exforge  -dc hctz    Hematuria  Hold Heparin repeat UA in am if still positive then will do work up    MRI reviewed  1. No acute intracranial abnormality. 2. Mild chronic microvascular ischemic disease. 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 3. Chronic left maxillary sinusitis with complete opacification and calcified  material, likely representing fungal colonization    MRi c spine    Very mild degenerative changes in the cervical spine, without significant spinal  canal or foraminal stenosis at any level. CTA    1.  No evidence of significant stenosis or aneurysm.     CTA Neck:  1. No evidence of significant stenosis.     CT Brain Perfusion:  1. No acute abnormality. I added Rocephin however there is no tendernes on Sinus exam may need to follow up out patient ENT. She has mild leukocytosis follow CBC    Code status: full  DVT prophylaxis: [facility]       Hospital Problems  Date Reviewed: 2/7/2018          Codes Class Noted POA    Nausea and vomiting ICD-10-CM: R11.2  ICD-9-CM: 787.01  6/29/2022 Yes        Type 2 diabetes mellitus with hyperglycemia (HCC) ICD-10-CM: E11.65  ICD-9-CM: 250.00  6/29/2022 Yes        Upper GI bleeding ICD-10-CM: K92.2  ICD-9-CM: 578.9  6/29/2022 Yes        Hyponatremia ICD-10-CM: E87.1  ICD-9-CM: 276.1  2/7/2018 Unknown                Review of Systems:   A comprehensive review of systems was negative except for that written in the HPI. Vital Signs:    Last 24hrs VS reviewed since prior progress note. Most recent are:  Visit Vitals  /72   Pulse 79   Temp 98.3 °F (36.8 °C)   Resp 15   Ht 4' 9.09\" (1.45 m)   Wt 74.8 kg (165 lb)   SpO2 100%   Breastfeeding No   BMI 35.60 kg/m²         Intake/Output Summary (Last 24 hours) at 7/4/2022 1454  Last data filed at 7/4/2022 1400  Gross per 24 hour   Intake 568.41 ml   Output 1350 ml   Net -781.59 ml        Physical Examination:             Constitutional:  No acute distress, cooperative, pleasant    ENT:  Oral mucosa moist, oropharynx benign. Resp:  CTA bilaterally. No wheezing/rhonchi/rales. No accessory muscle use   CV:  Regular rhythm, normal rate, no murmurs, gallops, rubs    GI:  Soft, non distended, non tender. normoactive bowel sounds, no hepatosplenomegaly     Musculoskeletal:  No edema, warm, 2+ pulses throughout    Neurologic:  Moves all extremities. AAOx3, CN II-XII reviewed     Psych:  Good insight, Not anxious nor agitated.        Data Review:    Review and/or order of clinical lab test      Labs:     Recent Labs     07/04/22  0347 07/03/22  0040 WBC 16.0* 14.9*   HGB 9.5* 9.5*   HCT 25.4* 25.3*    367     Recent Labs     07/04/22  1356 07/04/22  0949 07/04/22  0347 07/03/22  2121 07/03/22  1459   * 121* 122*   < > 114*   K 3.8 3.9 4.1   < > 4.7   CL 94* 91* 91*   < > 83*   CO2 21 21 21   < > 21   BUN 17 17 20   < > 19   CREA 0.74 0.79 0.77   < > 0.82   * 172* 138*   < > 205*   CA 8.7 8.3* 8.2*   < > 8.3*   MG  --   --   --   --  1.7   PHOS  --   --   --   --  1.5*    < > = values in this interval not displayed. No results for input(s): ALT, AP, TBIL, TBILI, TP, ALB, GLOB, GGT, AML, LPSE in the last 72 hours. No lab exists for component: SGOT, GPT, AMYP, HLPSE  No results for input(s): INR, PTP, APTT, INREXT, INREXT in the last 72 hours. No results for input(s): FE, TIBC, PSAT, FERR in the last 72 hours. No results found for: FOL, RBCF   No results for input(s): PH, PCO2, PO2 in the last 72 hours. No results for input(s): CPK, CKNDX, TROIQ in the last 72 hours.     No lab exists for component: CPKMB  Lab Results   Component Value Date/Time    Cholesterol, total 141 02/09/2018 05:14 AM    HDL Cholesterol 36 02/09/2018 05:14 AM    LDL, calculated 75.8 02/09/2018 05:14 AM    Triglyceride 146 02/09/2018 05:14 AM    CHOL/HDL Ratio 3.9 02/09/2018 05:14 AM     Lab Results   Component Value Date/Time    Glucose (POC) 171 (H) 07/04/2022 11:12 AM    Glucose (POC) 142 (H) 07/04/2022 07:32 AM    Glucose (POC) 234 (H) 07/03/2022 09:19 PM    Glucose (POC) 211 (H) 07/03/2022 04:07 PM    Glucose (POC) 239 (H) 07/03/2022 01:51 PM     Lab Results   Component Value Date/Time    Color YELLOW/STRAW 07/04/2022 08:00 AM    Appearance CLEAR 07/04/2022 08:00 AM    Specific gravity 1.022 07/04/2022 08:00 AM    pH (UA) 7.5 07/04/2022 08:00 AM    Protein 100 (A) 07/04/2022 08:00 AM    Glucose Negative 07/04/2022 08:00 AM    Ketone Negative 07/04/2022 08:00 AM    Bilirubin Negative 07/04/2022 08:00 AM    Urobilinogen 1.0 07/04/2022 08:00 AM    Nitrites Negative 07/04/2022 08:00 AM    Leukocyte Esterase MODERATE (A) 07/04/2022 08:00 AM    Epithelial cells MODERATE (A) 07/04/2022 08:00 AM    Bacteria Negative 07/04/2022 08:00 AM    WBC 20-50 07/04/2022 08:00 AM    RBC >100 (H) 07/04/2022 08:00 AM         Medications Reviewed:     Current Facility-Administered Medications   Medication Dose Route Frequency    traMADoL (ULTRAM) tablet 50 mg  50 mg Oral Q6H PRN    3% sodium chloride (*HIGH ALERT*CONCENTRATED IV*) infusion  25 mL/hr IntraVENous CONTINUOUS    sodium chloride (NS) flush 5-40 mL  5-40 mL IntraVENous Q8H    sodium chloride (NS) flush 5-40 mL  5-40 mL IntraVENous PRN    pantoprazole (PROTONIX) tablet 40 mg  40 mg Oral ACB&D    melatonin tablet 4.5 mg  4.5 mg Oral QHS PRN    glucose chewable tablet 16 g  4 Tablet Oral PRN    glucagon (GLUCAGEN) injection 1 mg  1 mg IntraMUSCular PRN    dextrose 10% infusion 0-250 mL  0-250 mL IntraVENous PRN    simethicone (MYLICON) tablet 80 mg  80 mg Oral QID PRN    [Held by provider] heparin (porcine) injection 5,000 Units  5,000 Units SubCUTAneous Q8H    metoclopramide HCl (REGLAN) tablet 10 mg  10 mg Oral AC&HS    acetaminophen (TYLENOL) tablet 650 mg  650 mg Oral Q6H PRN    Or    acetaminophen (TYLENOL) suppository 650 mg  650 mg Rectal Q6H PRN    polyethylene glycol (MIRALAX) packet 17 g  17 g Oral DAILY PRN    ondansetron (ZOFRAN ODT) tablet 4 mg  4 mg Oral Q8H PRN    Or    ondansetron (ZOFRAN) injection 4 mg  4 mg IntraVENous Q6H PRN    ALPRAZolam (XANAX) tablet 0.25 mg  0.25 mg Oral QHS PRN    atorvastatin (LIPITOR) tablet 40 mg  40 mg Oral DAILY    [Held by provider] insulin glargine (LANTUS) injection 40 Units  40 Units SubCUTAneous QHS    insulin lispro (HUMALOG) injection   SubCUTAneous AC&HS    [Held by provider] amLODIPine/valsartan (EXFORGE) 10/320 mg   Oral DAILY     ______________________________________________________________________  EXPECTED LENGTH OF STAY: 2d 16h  ACTUAL LENGTH OF STAY:          [address] [place], MD

## 2022-07-04 NOTE — PROGRESS NOTES
Bedside and Verbal shift change report given to Don Moore (oncoming nurse) by Efrain Mckeon (offgoing nurse). Report included the following information SBAR, Kardex, Intake/Output, MAR, Recent Results and Cardiac Rhythm SR. Primary Nurse Johanna Arrington and Efrain Mckeon, RN performed a dual skin assessment on this patient No impairment noted  Michael score is see flow sheet    0800 Morning Assessment, VS being monitored - MD aware,  ACHS protocol being followed, family and p/t educated on notifying RN when p/t needs bedside assistance getting up to use the bathroom and p/t educated on fluid restriction, MD aware of low BPs - some morning BP meds held per MD.     0930: Nephrology at bedside    Neuro: Ox4; left pupil is fixed and non reactive to light  Cardiac: SR  Respiratory: RA  GI: Regular; <1000mL per day  : Bedside Commode  IV: R TL IJ; 20 G R Hand    1000:Patient turning self, resting quietly with family member at bed side, labs collected and sent  [phone] [id number]:  - MD notified    1200: Reassessment, VSS, family at bedside  1400: P/t turning self, family at bedside, p/t resting quietly, labs collected  1500: labs collected  1600: Reassessment, VS being monitored, family at bed side, p/t resting quietly    1800: P/t resting quietly in bed turning self, family at bedside, clean catch urine sample obtained and sent to lab.    1900: lab recollection    Bedside and Verbal shift change report given to Efrain Mckeon (oncoming nurse) by Don Moore (offgoing nurse).  Report included the following information SBAR, Kardex, Intake/Output, MAR, Recent Results and Cardiac Rhythm SR.

## 2022-07-04 NOTE — PROGRESS NOTES
Consult patient was done with the assistance of resident/nurse, telemetry video examination and remotely reviewing patient chart x-rays echocardiogram and lab work. I personally reviewed telemetry, vitals and ventilators waveform with the video communication. 60-year-old female admitted to critical care unit with symptomatic severe hyponatremia. Serum sodium level improved with 3% sodium chloride infusion. Last 2 sodium level was 122. Patient's symptoms has improved. Patient denies any abdominal pain nausea vomiting. No further vomiting of blood. Overnight patient blood pressure was soft. Blood sugar was stable. Patient Vitals for the past 12 hrs:   Temp Pulse Resp BP SpO2   07/04/22 1000 -- 68 9 (!) 106/59 100 %   07/04/22 0945 -- 71 12 (!) 91/43 99 %   07/04/22 0930 -- 72 12 (!) 105/42 100 %   07/04/22 0915 -- 70 10 (!) 93/42 100 %   07/04/22 0900 -- 71 16 (!) 132/59 100 %   07/04/22 0845 -- 80 16 (!) 150/72 100 %   07/04/22 0830 -- 75 14 136/68 100 %   07/04/22 0815 -- 70 16 (!) 117/53 100 %   07/04/22 0800 97.3 °F (36.3 °C) 68 15 (!) 142/78 99 %   07/04/22 0745 -- 73 12 (!) 159/77 100 %   07/04/22 0730 -- 66 16 (!) 116/52 100 %   07/04/22 0715 -- (!) 59 18 (!) 101/54 99 %   07/04/22 0700 -- (!) 58 14 (!) 110/53 100 %   07/04/22 0550 -- (!) 58 11 (!) 91/55 97 %   07/04/22 0450 -- 60 12 129/69 99 %   07/04/22 0400 97.6 °F (36.4 °C) 75 17 135/67 100 %   07/04/22 0350 -- 67 13 (!) 100/54 100 %   07/04/22 0250 -- 79 12 115/68 100 %   07/04/22 0150 -- 60 13 (!) 82/51 98 %   07/04/22 0050 -- 65 13 98/65 99 %   07/04/22 0000 97.3 °F (36.3 °C) 70 16 (!) 103/54 100 %   07/03/22 2350 -- 73 18 110/76 100 %     Performed via video assessment.   Gen: Patient is alert and oriented in NAD  HEENT: NC/AT, EOMI  Chest: Chest movement is equal bilaterally  Cardiac: Cardiac monitor reveals SR  Extremities: Extremities appear well perfused with no obvious edema  Neuro: Nonfocal  Recent Results (from the past 24 hour(s)) METABOLIC PANEL, BASIC    Collection Time: 07/03/22 10:22 AM   Result Value Ref Range    Sodium 117 (LL) 136 - 145 mmol/L    Potassium 3.9 3.5 - 5.1 mmol/L    Chloride 90 (L) 97 - 108 mmol/L    CO2 19 (L) 21 - 32 mmol/L    Anion gap 8 5 - 15 mmol/L    Glucose 150 (H) 65 - 100 mg/dL    BUN 23 (H) 6 - 20 MG/DL    Creatinine 0.60 0.55 - 1.02 MG/DL    BUN/Creatinine ratio 38 (H) 12 - 20      GFR est AA >60 >60 ml/min/1.73m2    GFR est non-AA >60 >60 ml/min/1.73m2    Calcium 7.9 (L) 8.5 - 10.1 MG/DL   GLUCOSE, POC    Collection Time: 07/03/22 12:46 PM   Result Value Ref Range    Glucose (POC) 195 (H) 65 - 117 mg/dL    Performed by Lucia Palm    TROPONIN-HIGH SENSITIVITY    Collection Time: 07/03/22  1:18 PM   Result Value Ref Range    Troponin-High Sensitivity 9 0 - 51 ng/L   GLUCOSE, POC    Collection Time: 07/03/22  1:51 PM   Result Value Ref Range    Glucose (POC) 239 (H) 65 - 117 mg/dL    Performed by Lucia Palm    TROPONIN-HIGH SENSITIVITY    Collection Time: 07/03/22  2:59 PM   Result Value Ref Range    Troponin-High Sensitivity 9 0 - 51 ng/L   METABOLIC PANEL, BASIC    Collection Time: 07/03/22  2:59 PM   Result Value Ref Range    Sodium 114 (LL) 136 - 145 mmol/L    Potassium 4.7 3.5 - 5.1 mmol/L    Chloride 83 (L) 97 - 108 mmol/L    CO2 21 21 - 32 mmol/L    Anion gap 10 5 - 15 mmol/L    Glucose 205 (H) 65 - 100 mg/dL    BUN 19 6 - 20 MG/DL    Creatinine 0.82 0.55 - 1.02 MG/DL    BUN/Creatinine ratio 23 (H) 12 - 20      GFR est AA >60 >60 ml/min/1.73m2    GFR est non-AA >60 >60 ml/min/1.73m2    Calcium 8.3 (L) 8.5 - 10.1 MG/DL   MAGNESIUM    Collection Time: 07/03/22  2:59 PM   Result Value Ref Range    Magnesium 1.7 1.6 - 2.4 mg/dL   PHOSPHORUS    Collection Time: 07/03/22  2:59 PM   Result Value Ref Range    Phosphorus 1.5 (L) 2.6 - 4.7 MG/DL   GLUCOSE, POC    Collection Time: 07/03/22  4:07 PM   Result Value Ref Range    Glucose (POC) 211 (H) 65 - 117 mg/dL    Performed by [id number] [place], POC Collection Time: 07/03/22  9:19 PM   Result Value Ref Range    Glucose (POC) 234 (H) 65 - 117 mg/dL    Performed by [facility]    METABOLIC PANEL, BASIC    Collection Time: 07/03/22  9:21 PM   Result Value Ref Range    Sodium 122 (L) 136 - 145 mmol/L    Potassium 4.2 3.5 - 5.1 mmol/L    Chloride 90 (L) 97 - 108 mmol/L    CO2 21 21 - 32 mmol/L    Anion gap 11 5 - 15 mmol/L    Glucose 206 (H) 65 - 100 mg/dL    BUN 21 (H) 6 - 20 MG/DL    Creatinine 0.87 0.55 - 1.02 MG/DL    BUN/Creatinine ratio 24 (H) 12 - 20      GFR est AA >60 >60 ml/min/1.73m2    GFR est non-AA >60 >60 ml/min/1.73m2    Calcium 8.0 (L) 8.5 - 10.1 MG/DL   CBC WITH AUTOMATED DIFF    Collection Time: 07/04/22  3:47 AM   Result Value Ref Range    WBC 16.0 (H) 3.6 - 11.0 K/uL    RBC 3.21 (L) 3.80 - 5.20 M/uL    HGB 9.5 (L) 11.5 - 16.0 g/dL    HCT 25.4 (L) 35.0 - 47.0 %    MCV 79.1 (L) 80.0 - 99.0 FL    MCH 29.6 26.0 - 34.0 PG    MCHC 37.4 (H) 30.0 - 36.5 g/dL    RDW 11.5 11.5 - 14.5 %    PLATELET 911 126 - 581 K/uL    MPV 8.4 (L) 8.9 - 12.9 FL    NRBC 0.0 0  WBC    ABSOLUTE NRBC 0.00 0.00 - 0.01 K/uL    NEUTROPHILS 52 32 - 75 %    LYMPHOCYTES 39 12 - 49 %    MONOCYTES 7 5 - 13 %    EOSINOPHILS 1 0 - 7 %    BASOPHILS 0 0 - 1 %    IMMATURE GRANULOCYTES 1 (H) 0.0 - 0.5 %    ABS. NEUTROPHILS 8.3 (H) 1.8 - 8.0 K/UL    ABS. LYMPHOCYTES 6.2 (H) 0.8 - 3.5 K/UL    ABS. MONOCYTES 1.1 (H) 0.0 - 1.0 K/UL    ABS. EOSINOPHILS 0.2 0.0 - 0.4 K/UL    ABS. BASOPHILS 0.0 0.0 - 0.1 K/UL    ABS. IMM.  GRANS. 0.2 (H) 0.00 - 0.04 K/UL    DF SMEAR SCANNED      RBC COMMENTS NORMOCYTIC, NORMOCHROMIC     METABOLIC PANEL, BASIC    Collection Time: 07/04/22  3:47 AM   Result Value Ref Range    Sodium 122 (L) 136 - 145 mmol/L    Potassium 4.1 3.5 - 5.1 mmol/L    Chloride 91 (L) 97 - 108 mmol/L    CO2 21 21 - 32 mmol/L    Anion gap 10 5 - 15 mmol/L    Glucose 138 (H) 65 - 100 mg/dL    BUN 20 6 - 20 MG/DL    Creatinine 0.77 0.55 - 1.02 MG/DL    BUN/Creatinine ratio 26 (H) 12 - 20      GFR est AA >60 >60 ml/min/1.73m2    GFR est non-AA >60 >60 ml/min/1.73m2    Calcium 8.2 (L) 8.5 - 10.1 MG/DL   SAMPLES BEING HELD    Collection Time: 07/04/22  3:47 AM   Result Value Ref Range    SAMPLES BEING HELD 1PST     COMMENT        Add-on orders for these samples will be processed based on acceptable specimen integrity and analyte stability, which may vary by analyte. GLUCOSE, POC    Collection Time: 07/04/22  7:32 AM   Result Value Ref Range    Glucose (POC) 142 (H) 65 - 117 mg/dL    Performed by Gilmer Dawn    URINALYSIS W/ RFLX MICROSCOPIC    Collection Time: 07/04/22  8:00 AM   Result Value Ref Range    Color YELLOW/STRAW      Appearance CLEAR CLEAR      Specific gravity 1.022 1.003 - 1.030      pH (UA) 7.5 5.0 - 8.0      Protein 100 (A) NEG mg/dL    Glucose Negative NEG mg/dL    Ketone Negative NEG mg/dL    Bilirubin Negative NEG      Blood LARGE (A) NEG      Urobilinogen 1.0 0.2 - 1.0 EU/dL    Nitrites Negative NEG      Leukocyte Esterase MODERATE (A) NEG      WBC 20-50 0 - 4 /hpf    RBC >100 (H) 0 - 5 /hpf    Epithelial cells MODERATE (A) FEW /lpf    Bacteria Negative NEG /hpf    Hyaline cast 0-2 0 - 2 /lpf     Assessment  Symptomatic hyponatremia  Upper GI bleed  Type 2 diabetes with episode of hypoglycemia    Plan   Neuro - pain controlled and CAM negative     Cvs  -morning blood pressure is 82/51. Antihypertensive medication held. Pulm - adequate oxygenation and normal WOB     GI -nausea vomiting improved. No further coffee-ground vomiting. H&H is stable. Continue Protonix. Oral diet as tolerated       - required additional 3% overnight; hyponatremia likely related to SIADH; sodium improved to 122 and remain stable. Symptoms improved. Further management of hyponatremia as per nephrologist.    Heme-on dvt proph without evidence of hemorrhage  -No acute need for transfusion of blood products     ID - No evidence of an acute infection     Endo -  h/o DM .   Long-acting insulin was on hold. Continue short acting insulin. Monitor blood sugar closely. Continue Lipitor for hyper cholesterolemia. FEN - hyponatremia as above     These illnesses have required me to:   (1) perform high complexity decision making for assessment and support;   (2) assess the patient via video;   (3) personally review the medical record and laboratory and diagnostic imaging results;   (6) discuss this patient's case management with other healthcare providers; and   7) apply and interpret advanced monitoring techniques. As a result of this, I personally spent  31  minutes providing critical care services exclusively to this patient. This was in exclusion of the time spent performing procedures or teaching.

## 2022-07-04 NOTE — PROGRESS NOTES
Justice ROBLES. 23.  YOB: 1960          Assessment & Plan:   Hypo-osmolar hyponatremia d/t SIADH in the setting of N/V and abdominal pain, h/o thiazide diuretics  HTN> hypotension  DM-2  CP     Plan :    Give 3% saline 25 ml/hr for 4 hr and check NA after infusion. Please notify on-call nephrology with sodium result  Check urine NA today and urine osm daily  Goal of  by 7/5 4 am  Hold BP med's         Subjective:   CC: HYPONATREMIA   HPI: Patient seen and examined. ROS: No HA.    Current Facility-Administered Medications   Medication Dose Route Frequency    traMADoL (ULTRAM) tablet 50 mg  50 mg Oral Q6H PRN    sodium chloride (NS) flush 5-40 mL  5-40 mL IntraVENous Q8H    sodium chloride (NS) flush 5-40 mL  5-40 mL IntraVENous PRN    pantoprazole (PROTONIX) tablet 40 mg  40 mg Oral ACB&D    melatonin tablet 4.5 mg  4.5 mg Oral QHS PRN    glucose chewable tablet 16 g  4 Tablet Oral PRN    glucagon (GLUCAGEN) injection 1 mg  1 mg IntraMUSCular PRN    dextrose 10% infusion 0-250 mL  0-250 mL IntraVENous PRN    simethicone (MYLICON) tablet 80 mg  80 mg Oral QID PRN    heparin (porcine) injection 5,000 Units  5,000 Units SubCUTAneous Q8H    metoclopramide HCl (REGLAN) tablet 10 mg  10 mg Oral AC&HS    sodium chloride (NS) flush 5-40 mL  5-40 mL IntraVENous Q8H    sodium chloride (NS) flush 5-40 mL  5-40 mL IntraVENous Q8H    sodium chloride (NS) flush 5-40 mL  5-40 mL IntraVENous PRN    acetaminophen (TYLENOL) tablet 650 mg  650 mg Oral Q6H PRN    Or    acetaminophen (TYLENOL) suppository 650 mg  650 mg Rectal Q6H PRN    polyethylene glycol (MIRALAX) packet 17 g  17 g Oral DAILY PRN    ondansetron (ZOFRAN ODT) tablet 4 mg  4 mg Oral Q8H PRN    Or    ondansetron (ZOFRAN) injection 4 mg  4 mg IntraVENous Q6H PRN    ALPRAZolam (XANAX) tablet 0.25 mg  0.25 mg Oral QHS PRN    atorvastatin (LIPITOR) tablet 40 mg 40 mg Oral DAILY    [Held by provider] insulin glargine (LANTUS) injection 40 Units  40 Units SubCUTAneous QHS    insulin lispro (HUMALOG) injection   SubCUTAneous AC&HS    [Held by provider] amLODIPine/valsartan (EXFORGE) 10/320 mg   Oral DAILY          Objective:     Vitals:  Blood pressure (!) 106/59, pulse 68, temperature 97.3 °F (36.3 °C), resp. rate 9, weight 74.8 kg (165 lb), SpO2 100 %, not currently breastfeeding. Temp (24hrs), Av.7 °F (36.5 °C), Min:97.3 °F (36.3 °C), Max:98.4 °F (36.9 °C)      Intake and Output:  701 -  190  In: 400 [P.O.:400]  Out: 500 [Urine:500]  1901 -  0700  In: 8540 [P.O.:1000; I.V.:258]  Out: 2210 [Urine:2210]    Physical Exam:                Patient is intubated:  no    Physical Examination:   GENERAL ASSESSMENT: NAD  HEENT:Nontraumatic   CHEST: CTA  HEART: S1S2  ABDOMEN: Soft,NT,  :[place]:   EXTREMITY: EDEMA - no  NEURO:Grossly non focal          ECG/rhythm:    Data Review      No results for input(s): TNIPOC in the last 72 hours. No lab exists for component: ITNL   No results for input(s): CPK, CKMB, TROIQ in the last 72 hours.   Recent Labs     22  0949 22  0347 22  2121 22  1459 22  1459 22  0514 22  0040 22  1203 22  0410   * 122* 122*   < > 114*   < > 121*   < > 112*   K 3.9 4.1 4.2   < > 4.7   < > 3.6   < > 3.6   CL 91* 91* 90*   < > 83*   < > 90*   < > 83*   CO2 21 21 21   < > 21   < > 20*   < > 18*   BUN 17 20 21*   < > 19   < > 23*   < > 16   CREA 0.79 0.77 0.87   < > 0.82   < > 0.82   < > 0.82   * 138* 206*   < > 205*   < > 232*   < > 289*   PHOS  --   --   --   --  1.5*  --   --   --   --    MG  --   --   --   --  1.7  --   --   --   --    CA 8.3* 8.2* 8.0*   < > 8.3*   < > 8.1*   < > 7.6*   WBC  --  16.0*  --   --   --   --  14.9*  --  13.1*   HGB  --  9.5*  --   --   --   --  9.5*  --  UNABLE TO OBTAIN RESULT DUE TO HYPONATREMIA   HCT  --  25.4*  --   --   --   -- 25.3*  --  26.1*   PLT  --  342  --   --   --   --  367  --  334    < > = values in this interval not displayed. No results for input(s): INR, PTP, APTT, INREXT, INREXT in the last 72 hours. Needs: urine analysis, urine sodium, protein and creatinine  Lab Results   Component Value Date/Time    Sodium,urine random 125 07/01/2022 09:04 AM    Creatinine, urine <13.00 06/29/2022 10:37 PM         Discussed with:  Pt, Daughter, Nursing     : Tonia Seaman MD  7/4/2022        [facility]:  [email or web address]  [facility] office:  [address] [place] [address], [address] [place] [address],[address] [place] [address]  Phone: [phone]  Fax :     [phone]    [place] office:  [address] [facility]  Phone - [phone]  Fax - [phone]

## 2022-07-04 NOTE — PROGRESS NOTES
Comprehensive Nutrition Assessment    Type and Reason for Visit: Initial,RD nutrition re-screen/LOS    Nutrition Recommendations/Plan:   1. Modify diet order: Regular, 4 Carb, FR per MD (1000 mL at this time)  2. No BM x 5 days - consider modification to bowel regimen      Malnutrition Assessment:  Malnutrition Status:  No malnutrition (07/04/22 1139)    Context:  Acute illness     Findings of the 6 clinical characteristics of malnutrition:   Energy Intake:  No significant decrease in energy intake  Weight Loss:  No significant weight loss     Body Fat Loss:  No significant body fat loss,     Muscle Mass Loss:  No significant muscle mass loss,    Fluid Accumulation:  No significant fluid accumulation,     Strength:  Not performed     Nutrition Assessment:     Pt is a 58year old female admitted with Hyponatremia [E87.1]  Type 2 diabetes mellitus with hyperglycemia (HCC) [E11.65]  Nausea and vomiting [R11.2]. She  has a past medical history of Diabetes ([address] GERD (gastroesophageal reflux disease), and Hypertension. RD screen for LOS, Day 3 in ICU. PO intake averaging 50-75% of all meals. No c/o N/V/D at this time. NKFA. No hx of chewing/swallowing problems. Admission weight is stated, 165#. Patient reports # with no recent weight or appetite changes. Sodium has been low for several days, most recently 121 - an improvement. Blood sugars have been labile but improved over last 24 hours. Declines ONS at this time. Per documentation, no BM x 5 days. Wt Readings from Last 10 Encounters:   06/29/22 74.8 kg (165 lb)   06/21/22 74.8 kg (165 lb)   02/09/18 77.2 kg (170 lb 3.2 oz)   11/28/17 68.9 kg (152 lb)       Nutrition Related Findings:      Wound Type: None     Last Bowel Movement Date: 06/29/22  Abdominal Assessment: Guarding  Appetite: Good  Bowel Sounds: Active   Edema:Generalized: No Edema (7/4/2022  8:00 AM)      Nutr.  Labs:      Lab Results   Component Value Date/Time    GFR est AA >60 07/04/2022 09:49 AM    GFR est non-AA >60 07/04/2022 09:49 AM    Creatinine 0.79 07/04/2022 09:49 AM    BUN 17 07/04/2022 09:49 AM    Sodium 121 (L) 07/04/2022 09:49 AM    Potassium 3.9 07/04/2022 09:49 AM    Chloride 91 (L) 07/04/2022 09:49 AM    CO2 21 07/04/2022 09:49 AM       Lab Results   Component Value Date/Time    Glucose 172 (H) 07/04/2022 09:49 AM    Glucose (POC) 171 (H) 07/04/2022 11:12 AM       Lab Results   Component Value Date/Time    Hemoglobin A1c 7.9 (H) 07/02/2022 04:10 AM       Nutr. Meds:  Lipitor, humalog, reglan, zofran PRN, miralax PRN      Current Nutrition Intake & Therapies:  Average Meal Intake: 26-50%  Average Supplement Intake: None ordered  ADULT DIET Regular; 4 carb choices (60 gm/meal); 1000 ml; no sugar    Anthropometric Measures:  Height: 4' 9.09\" (145 cm)  Ideal Body Weight (IBW): 85 lbs (39 kg)     Current Body Wt:  74.8 kg (165 lb), 194.1 % IBW. Stated  Current BMI (kg/m2): 35.6        Weight Adjustment: No adjustment                 BMI Category: Obese class 2 (BMI 35.0-39. 9)    Estimated Daily Nutrient Needs:  Energy Requirements Based On: Kcal/kg  Weight Used for Energy Requirements: Current  Energy (kcal/day): 1870 (25 kcal/kg)  Weight Used for Protein Requirements: Current  Protein (g/day): 75-89 (1.0-1.2 g/kg)     Fluid (ml/day): 1870    Nutrition Diagnosis:   · Predicted inadequate energy intake related to inadequate protein-energy intake as evidenced by intake 26-50%      Nutrition Interventions:   Food and/or Nutrient Delivery: Continue current diet  Nutrition Education/Counseling: No recommendations at this time  Coordination of Nutrition Care: Continue to monitor while inpatient,Interdisciplinary rounds       Goals:     Goals: Meet at least 75% of estimated needs,by next RD assessment       Nutrition Monitoring and Evaluation:   Behavioral-Environmental Outcomes: None identified  Food/Nutrient Intake Outcomes: Food and nutrient intake  Physical Signs/Symptoms Outcomes: Biochemical data,Hemodynamic status,Skin,Weight    Discharge Planning:     Too soon to determine    Vero Soler MS, RD  Contact: Ext: [phone], or via [employer]

## 2022-07-04 NOTE — PROGRESS NOTES
1900-Bedside and Verbal shift change report given to Vida Kwon (oncoming nurse) by Jose Luis Hensley (offgoing nurse). Report included the following information SBAR, Kardex, Intake/Output, MAR, Recent Results, Med Rec Status and Cardiac Rhythm NSR.     1930-Patient resting quietly in bed with family member at bedside. 2000-Full assessment completed, see flow sheets     TRANSFER - OUT REPORT:    Verbal report given to April RN(name) on [address]  being transferred to Psychiatric(unit) for urgent transfer       Report consisted of patients Situation, Background, Assessment and   Recommendations(SBAR). Information from the following report(s) SBAR, Kardex, Intake/Output, MAR, Recent Results, Med Rec Status and Cardiac Rhythm NSR was reviewed with the receiving nurse. Lines:   Peripheral IV 06/29/22 Right Hand (Active)   Site Assessment Clean, dry, & intact 07/04/22 2000   Phlebitis Assessment 0 07/04/22 2000   Infiltration Assessment 0 07/04/22 2000   Dressing Status Clean, dry, & intact 07/04/22 2000   Dressing Type Transparent 07/04/22 2000   Hub Color/Line Status Pink;Flushed;Capped 07/04/22 2000   Action Taken Open ports on tubing capped 07/04/22 2000   Alcohol Cap Used Yes 07/04/22 2000        Opportunity for questions and clarification was provided.       Patient transported with:   Monitor

## 2022-07-05 LAB
ANION GAP SERPL CALC-SCNC: 10 MMOL/L (ref 5–15)
ANION GAP SERPL CALC-SCNC: 8 MMOL/L (ref 5–15)
ANION GAP SERPL CALC-SCNC: 9 MMOL/L (ref 5–15)
APPEARANCE UR: CLEAR
BACTERIA SPEC CULT: NORMAL
BACTERIA URNS QL MICRO: NEGATIVE /HPF
BILIRUB UR QL: NEGATIVE
BUN SERPL-MCNC: 18 MG/DL (ref 6–20)
BUN SERPL-MCNC: 20 MG/DL (ref 6–20)
BUN SERPL-MCNC: 20 MG/DL (ref 6–20)
BUN/CREAT SERPL: 20 (ref 12–20)
BUN/CREAT SERPL: 20 (ref 12–20)
BUN/CREAT SERPL: 23 (ref 12–20)
CALCIUM SERPL-MCNC: 8.9 MG/DL (ref 8.5–10.1)
CALCIUM SERPL-MCNC: 9.2 MG/DL (ref 8.5–10.1)
CALCIUM SERPL-MCNC: 9.3 MG/DL (ref 8.5–10.1)
CC UR VC: NORMAL
CHLORIDE SERPL-SCNC: 100 MMOL/L (ref 97–108)
CHLORIDE SERPL-SCNC: 96 MMOL/L (ref 97–108)
CHLORIDE SERPL-SCNC: 97 MMOL/L (ref 97–108)
CO2 SERPL-SCNC: 21 MMOL/L (ref 21–32)
COLOR UR: ABNORMAL
CREAT SERPL-MCNC: 0.88 MG/DL (ref 0.55–1.02)
CREAT SERPL-MCNC: 0.9 MG/DL (ref 0.55–1.02)
CREAT SERPL-MCNC: 0.98 MG/DL (ref 0.55–1.02)
EPITH CASTS URNS QL MICRO: ABNORMAL /LPF
ERYTHROCYTE [DISTWIDTH] IN BLOOD BY AUTOMATED COUNT: 11.9 % (ref 11.5–14.5)
GLUCOSE BLD STRIP.AUTO-MCNC: 191 MG/DL (ref 65–117)
GLUCOSE BLD STRIP.AUTO-MCNC: 231 MG/DL (ref 65–117)
GLUCOSE BLD STRIP.AUTO-MCNC: 240 MG/DL (ref 65–117)
GLUCOSE SERPL-MCNC: 166 MG/DL (ref 65–100)
GLUCOSE SERPL-MCNC: 168 MG/DL (ref 65–100)
GLUCOSE SERPL-MCNC: 227 MG/DL (ref 65–100)
GLUCOSE UR STRIP.AUTO-MCNC: NEGATIVE MG/DL
HCT VFR BLD AUTO: 27.2 % (ref 35–47)
HGB BLD-MCNC: 10 G/DL (ref 11.5–16)
HGB UR QL STRIP: NEGATIVE
HYALINE CASTS URNS QL MICRO: ABNORMAL /LPF (ref 0–2)
KETONES UR QL STRIP.AUTO: NEGATIVE MG/DL
LEUKOCYTE ESTERASE UR QL STRIP.AUTO: NEGATIVE
MCH RBC QN AUTO: 29.9 PG (ref 26–34)
MCHC RBC AUTO-ENTMCNC: 36.8 G/DL (ref 30–36.5)
MCV RBC AUTO: 81.4 FL (ref 80–99)
NITRITE UR QL STRIP.AUTO: NEGATIVE
NRBC # BLD: 0 K/UL (ref 0–0.01)
NRBC BLD-RTO: 0 PER 100 WBC
OSMOLALITY UR: 294 MOSM/KG H2O
PH UR STRIP: 7.5 [PH] (ref 5–8)
PLATELET # BLD AUTO: 355 K/UL (ref 150–400)
PMV BLD AUTO: 8.4 FL (ref 8.9–12.9)
POTASSIUM SERPL-SCNC: 4.3 MMOL/L (ref 3.5–5.1)
POTASSIUM SERPL-SCNC: 4.7 MMOL/L (ref 3.5–5.1)
POTASSIUM SERPL-SCNC: 4.8 MMOL/L (ref 3.5–5.1)
PROT UR STRIP-MCNC: 100 MG/DL
RBC # BLD AUTO: 3.34 M/UL (ref 3.8–5.2)
RBC #/AREA URNS HPF: ABNORMAL /HPF (ref 0–5)
SERVICE CMNT-IMP: ABNORMAL
SERVICE CMNT-IMP: NORMAL
SODIUM SERPL-SCNC: 126 MMOL/L (ref 136–145)
SODIUM SERPL-SCNC: 128 MMOL/L (ref 136–145)
SODIUM SERPL-SCNC: 129 MMOL/L (ref 136–145)
SP GR UR REFRACTOMETRY: 1.01 (ref 1–1.03)
UA: UC IF INDICATED,UAUC: ABNORMAL
UROBILINOGEN UR QL STRIP.AUTO: 1 EU/DL (ref 0.2–1)
WBC # BLD AUTO: 14.3 K/UL (ref 3.6–11)
WBC URNS QL MICRO: ABNORMAL /HPF (ref 0–4)

## 2022-07-05 PROCEDURE — 74011250637 HC RX REV CODE- 250/637: Performed by: HOSPITALIST

## 2022-07-05 PROCEDURE — 74011250637 HC RX REV CODE- 250/637: Performed by: FAMILY MEDICINE

## 2022-07-05 PROCEDURE — 97110 THERAPEUTIC EXERCISES: CPT

## 2022-07-05 PROCEDURE — 74011000250 HC RX REV CODE- 250: Performed by: INTERNAL MEDICINE

## 2022-07-05 PROCEDURE — 74011250636 HC RX REV CODE- 250/636: Performed by: NURSE PRACTITIONER

## 2022-07-05 PROCEDURE — 80048 BASIC METABOLIC PNL TOTAL CA: CPT

## 2022-07-05 PROCEDURE — 82962 GLUCOSE BLOOD TEST: CPT

## 2022-07-05 PROCEDURE — 97116 GAIT TRAINING THERAPY: CPT

## 2022-07-05 PROCEDURE — 65270000046 HC RM TELEMETRY

## 2022-07-05 PROCEDURE — 36415 COLL VENOUS BLD VENIPUNCTURE: CPT

## 2022-07-05 PROCEDURE — 74011250637 HC RX REV CODE- 250/637: Performed by: INTERNAL MEDICINE

## 2022-07-05 PROCEDURE — 74011000250 HC RX REV CODE- 250: Performed by: HOSPITALIST

## 2022-07-05 PROCEDURE — 85027 COMPLETE CBC AUTOMATED: CPT

## 2022-07-05 PROCEDURE — 83935 ASSAY OF URINE OSMOLALITY: CPT

## 2022-07-05 PROCEDURE — 74011636637 HC RX REV CODE- 636/637: Performed by: INTERNAL MEDICINE

## 2022-07-05 PROCEDURE — 74011250636 HC RX REV CODE- 250/636: Performed by: HOSPITALIST

## 2022-07-05 PROCEDURE — 81001 URINALYSIS AUTO W/SCOPE: CPT

## 2022-07-05 RX ORDER — SODIUM CHLORIDE 0.9 % (FLUSH) 0.9 %
5-40 SYRINGE (ML) INJECTION AS NEEDED
Status: DISCONTINUED | OUTPATIENT
Start: 2022-07-05 | End: 2022-07-07 | Stop reason: HOSPADM

## 2022-07-05 RX ORDER — 3% SODIUM CHLORIDE 3 G/100ML
25 INJECTION, SOLUTION INTRAVENOUS CONTINUOUS
Status: DISPENSED | OUTPATIENT
Start: 2022-07-05 | End: 2022-07-05

## 2022-07-05 RX ADMIN — ATORVASTATIN CALCIUM 40 MG: 20 TABLET, FILM COATED ORAL at 09:08

## 2022-07-05 RX ADMIN — PANTOPRAZOLE SODIUM 40 MG: 40 TABLET, DELAYED RELEASE ORAL at 05:52

## 2022-07-05 RX ADMIN — METOCLOPRAMIDE 10 MG: 10 TABLET ORAL at 05:52

## 2022-07-05 RX ADMIN — Medication 15 MG: at 10:42

## 2022-07-05 RX ADMIN — CEFTRIAXONE 1 G: 1 INJECTION, POWDER, FOR SOLUTION INTRAMUSCULAR; INTRAVENOUS at 15:59

## 2022-07-05 RX ADMIN — POLYETHYLENE GLYCOL 3350 17 G: 17 POWDER, FOR SOLUTION ORAL at 03:10

## 2022-07-05 RX ADMIN — SODIUM CHLORIDE, PRESERVATIVE FREE 10 ML: 5 INJECTION INTRAVENOUS at 21:30

## 2022-07-05 RX ADMIN — PANTOPRAZOLE SODIUM 40 MG: 40 TABLET, DELAYED RELEASE ORAL at 15:59

## 2022-07-05 RX ADMIN — SODIUM CHLORIDE, PRESERVATIVE FREE 10 ML: 5 INJECTION INTRAVENOUS at 05:52

## 2022-07-05 RX ADMIN — METOCLOPRAMIDE 10 MG: 10 TABLET ORAL at 15:59

## 2022-07-05 RX ADMIN — Medication 2 UNITS: at 09:08

## 2022-07-05 RX ADMIN — METOCLOPRAMIDE 10 MG: 10 TABLET ORAL at 21:30

## 2022-07-05 RX ADMIN — TRAMADOL HYDROCHLORIDE 50 MG: 50 TABLET ORAL at 10:19

## 2022-07-05 RX ADMIN — SODIUM CHLORIDE 25 ML/HR: 3 INJECTION, SOLUTION INTRAVENOUS at 00:38

## 2022-07-05 RX ADMIN — METOCLOPRAMIDE 10 MG: 10 TABLET ORAL at 11:50

## 2022-07-05 RX ADMIN — Medication 3 UNITS: at 16:07

## 2022-07-05 RX ADMIN — Medication 2 UNITS: at 21:29

## 2022-07-05 RX ADMIN — SODIUM CHLORIDE, PRESERVATIVE FREE 10 ML: 5 INJECTION INTRAVENOUS at 16:00

## 2022-07-05 NOTE — PROGRESS NOTES
Problem: Mobility Impaired (Adult and Pediatric)  Goal: *Acute Goals and Plan of Care (Insert Text)  Description: FUNCTIONAL STATUS PRIOR TO ADMISSION: Patient was independent and active without use of DME.    HOME SUPPORT PRIOR TO ADMISSION: The patient lived with her daughter but did not require assist.    Physical Therapy Goals  Initiated 7/3/2022  1. Patient will move from supine to sit and sit to supine  in bed with independence within 7 day(s). 2.  Patient will transfer from bed to chair and chair to bed with independence using the least restrictive device within 7 day(s). 3.  Patient will perform sit to stand with independence within 7 day(s). 4.  Patient will ambulate with independence for 150 feet with the least restrictive device within 7 day(s). 5.  Patient will ascend/descend 2 stairs with 1 handrail(s) with supervision/set-up within 7 day(s). Outcome: Progressing Towards Goal   PHYSICAL THERAPY TREATMENT  Patient: Martín Somers (58 y.o. female)  Date: 7/5/2022  Diagnosis: Hyponatremia [E87.1]  Type 2 diabetes mellitus with hyperglycemia (HCC) [E11.65]  Nausea and vomiting [R11.2] <principal problem not specified>       Precautions:    Chart, physical therapy assessment, plan of care and goals were reviewed. ASSESSMENT  Patient continues with skilled PT services and is progressing towards goals. Pt received supine with bed with granddaughter at bedside. Granddaughter assisting with translation this day. Pt c/o neck and back pain. Performed seated stretching and ROM exercises with manual facilitation at EOB. Pt requires largely Min A for OOB mobility d/t fear of falling with low vision and new environment. Pt utilizes bilateral HHA for steadying. Maintains shuffled steps throughout which did not improve with verbal cueing or encouragement to take larger steps. Up in chair at end of session with all needs in reach.     Current Level of Function Impacting Discharge (mobility/balance): Min A d/t low vision more than weakness    Other factors to consider for discharge: good family support         PLAN :  Patient continues to benefit from skilled intervention to address the above impairments. Continue treatment per established plan of care. to address goals. Recommendation for discharge: (in order for the patient to meet his/her long term goals)  Physical therapy at least 2 days/week in the home     This discharge recommendation:  Has been made in collaboration with the attending provider and/or case management    IF patient discharges home will need the following DME: patient owns DME required for discharge       SUBJECTIVE:   Patient stated Just a little pain.     OBJECTIVE DATA SUMMARY:   Critical Behavior:  Neurologic State: Alert  Orientation Level: Oriented X4  Cognition: Appropriate decision making,Follows commands,Appropriate for age attention/concentration,Appropriate safety awareness     Functional Mobility Training:  Bed Mobility:     Supine to Sit: Contact guard assistance  Sit to Supine: Contact guard assistance  Scooting: Contact guard assistance        Transfers:  Sit to Stand: Contact guard assistance  Stand to Sit: Contact guard assistance                             Balance:  Sitting: Intact  Standing: Impaired  Standing - Static: Fair  Standing - Dynamic : Fair  Ambulation/Gait Training:  Distance (ft): 80 Feet (ft)  Assistive Device: Gait belt (bilateral HHA)  Ambulation - Level of Assistance: Minimal assistance        Gait Abnormalities: Decreased step clearance; Step to gait        Base of Support: Widened     Speed/Juju: Pace decreased (<100 feet/min); Shuffled; Slow  Step Length: Right shortened;Left shortened               Activity Tolerance:   Good    After treatment patient left in no apparent distress:   Sitting in chair, Call bell within reach, and Bed / chair alarm activated    COMMUNICATION/COLLABORATION:   The patients plan of care was discussed with: Registered nurse. Theresa Mckeon, PT   Time Calculation: 26 mins

## 2022-07-05 NOTE — PROGRESS NOTES
Problem: Falls - Risk of  Goal: *Absence of Falls  Description: Document Bob Retana Fall Risk and appropriate interventions in the flowsheet. Outcome: Progressing Towards Goal  Note: Fall Risk Interventions:  Mobility Interventions: Bed/chair exit alarm,Patient to call before getting OOB         Medication Interventions: Bed/chair exit alarm,Patient to call before getting OOB,Teach patient to arise slowly    Elimination Interventions: Bed/chair exit alarm,Call light in reach,Patient to call for help with toileting needs              Problem: Patient Education: Go to Patient Education Activity  Goal: Patient/Family Education  Outcome: Progressing Towards Goal     Problem: Diabetes Self-Management  Goal: *Disease process and treatment process  Description: Define diabetes and identify own type of diabetes; list 3 options for treating diabetes. Outcome: Progressing Towards Goal  Goal: *Incorporating nutritional management into lifestyle  Description: Describe effect of type, amount and timing of food on blood glucose; list 3 methods for planning meals. Outcome: Progressing Towards Goal  Goal: *Incorporating physical activity into lifestyle  Description: State effect of exercise on blood glucose levels. Outcome: Progressing Towards Goal  Goal: *Developing strategies to promote health/change behavior  Description: Define the ABC's of diabetes; identify appropriate screenings, schedule and personal plan for screenings. Outcome: Progressing Towards Goal  Goal: *Using medications safely  Description: State effect of diabetes medications on diabetes; name diabetes medication taking, action and side effects. Outcome: Progressing Towards Goal  Goal: *Monitoring blood glucose, interpreting and using results  Description: Identify recommended blood glucose targets  and personal targets.   Outcome: Progressing Towards Goal  Goal: *Prevention, detection, treatment of acute complications  Description: List symptoms of hyper- and hypoglycemia; describe how to treat low blood sugar and actions for lowering  high blood glucose level. Outcome: Progressing Towards Goal  Goal: *Prevention, detection and treatment of chronic complications  Description: Define the natural course of diabetes and describe the relationship of blood glucose levels to long term complications of diabetes.   Outcome: Progressing Towards Goal  Goal: *Developing strategies to address psychosocial issues  Description: Describe feelings about living with diabetes; identify support needed and support network  Outcome: Progressing Towards Goal  Goal: *Insulin pump training  Outcome: Progressing Towards Goal  Goal: *Sick day guidelines  Outcome: Progressing Towards Goal  Goal: *Patient Specific Goal (EDIT GOAL, INSERT TEXT)  Outcome: Progressing Towards Goal     Problem: Patient Education: Go to Patient Education Activity  Goal: Patient/Family Education  Outcome: Progressing Towards Goal     Problem: Patient Education: Go to Patient Education Activity  Goal: Patient/Family Education  Outcome: Progressing Towards Goal     Problem: Nutrition Deficit  Goal: *Optimize nutritional status  Outcome: Progressing Towards Goal

## 2022-07-05 NOTE — PROGRESS NOTES
Justice ROBLES. 23.  YOB: 1960          Assessment & Plan:   Hypo-osmolar hyponatremia d/t SIADH in the setting of N/V and abdominal pain, h/o thiazide diuretics  HTN> hypotension  DM-2  CP     Plan :   s/p 3% saline  Urine sodium 39 and urine osmolality 369 consistent with SIADH physiology  I will give tolvaptan 15 mg p.o. x1  Hold BP med's  BMP every 8 hours and please notify on-call nephrology         Subjective:   CC: HYPONATREMIA   HPI: Patient seen and examined.  128  ROS: neck pain   Current Facility-Administered Medications   Medication Dose Route Frequency    sodium chloride (NS) flush 5-40 mL  5-40 mL IntraVENous PRN    tolvaptan (SAMSCA) tablet (0.5 X 30 MG) 15 mg  15 mg Oral ONCE    traMADoL (ULTRAM) tablet 50 mg  50 mg Oral Q6H PRN    cefTRIAXone (ROCEPHIN) 1 g in 0.9% sodium chloride 10 mL IV syringe  1 g IntraVENous Q24H    sodium chloride (NS) flush 5-40 mL  5-40 mL IntraVENous PRN    sodium chloride (NS) flush 5-40 mL  5-40 mL IntraVENous Q8H    sodium chloride (NS) flush 5-40 mL  5-40 mL IntraVENous PRN    pantoprazole (PROTONIX) tablet 40 mg  40 mg Oral ACB&D    melatonin tablet 4.5 mg  4.5 mg Oral QHS PRN    glucose chewable tablet 16 g  4 Tablet Oral PRN    glucagon (GLUCAGEN) injection 1 mg  1 mg IntraMUSCular PRN    dextrose 10% infusion 0-250 mL  0-250 mL IntraVENous PRN    simethicone (MYLICON) tablet 80 mg  80 mg Oral QID PRN    [Held by provider] heparin (porcine) injection 5,000 Units  5,000 Units SubCUTAneous Q8H    metoclopramide HCl (REGLAN) tablet 10 mg  10 mg Oral AC&HS    acetaminophen (TYLENOL) tablet 650 mg  650 mg Oral Q6H PRN    Or    acetaminophen (TYLENOL) suppository 650 mg  650 mg Rectal Q6H PRN    polyethylene glycol (MIRALAX) packet 17 g  17 g Oral DAILY PRN    ondansetron (ZOFRAN ODT) tablet 4 mg  4 mg Oral Q8H PRN    Or    ondansetron (ZOFRAN) injection 4 mg  4 mg IntraVENous Q6H PRN    ALPRAZolam (XANAX) tablet 0.25 mg  0.25 mg Oral QHS PRN    atorvastatin (LIPITOR) tablet 40 mg  40 mg Oral DAILY    [Held by provider] insulin glargine (LANTUS) injection 40 Units  40 Units SubCUTAneous QHS    insulin lispro (HUMALOG) injection   SubCUTAneous AC&HS    [Held by provider] amLODIPine/valsartan (EXFORGE) 10/320 mg   Oral DAILY          Objective:     Vitals:  Blood pressure (!) 111/57, pulse 75, temperature 98.1 °F (36.7 °C), resp. rate 18, height 4' 9.09\" (1.45 m), weight 74.8 kg (165 lb), SpO2 98 %, not currently breastfeeding. Temp (24hrs), Av °F (36.7 °C), Min:97.5 °F (36.4 °C), Max:98.3 °F (36.8 °C)      Intake and Output:  701 - 1900  In: 180 [P.O.:180]  Out: 200 [Urine:200]  1901 -  07  In: 1012.1 [P.O.:920; I.V.:92.1]  Out: 2270 [Urine:2270]    Physical Exam:                Patient is intubated:  no    Physical Examination:   GENERAL ASSESSMENT: NAD  HEENT:Nontraumatic   CHEST: CTA  HEART: S1S2  ABDOMEN: Soft,NT,  :[place]:   EXTREMITY: EDEMA - no  NEURO:Grossly non focal          ECG/rhythm:    Data Review      No results for input(s): TNIPOC in the last 72 hours. No lab exists for component: ITNL   No results for input(s): CPK, CKMB, TROIQ in the last 72 hours.   Recent Labs     22  0710 22  0309 22  2307 22  0949 22  0347 22  2121 22  1459 22  0514 22  0040   * 126* 126*   < > 122*   < > 114*   < > 121*   K 4.3 4.7 4.4   < > 4.1   < > 4.7   < > 3.6   CL 97 96* 96*   < > 91*   < > 83*   < > 90*   CO2 21 21 19*   < > 21   < > 21   < > 20*   BUN 18 20 19   < > 20   < > 19   < > 23*   CREA 0.90 0.88 0.84   < > 0.77   < > 0.82   < > 0.82   * 166* 215*   < > 138*   < > 205*   < > 232*   PHOS  --   --   --   --   --   --  1.5*  --   --    MG  --   --   --   --   --   --  1.7  --   --    CA 9.3 8.9 8.6   < > 8.2*   < > 8.3*   < > 8.1*   WBC  --  14.3*  --   --  16.0*  -- --   --  14.9*   HGB  --  10.0*  --   --  9.5*  --   --   --  9.5*   HCT  --  27.2*  --   --  25.4*  --   --   --  25.3*   PLT  --  355  --   --  342  --   --   --  367    < > = values in this interval not displayed. No results for input(s): INR, PTP, APTT, INREXT, INREXT in the last 72 hours. Needs: urine analysis, urine sodium, protein and creatinine  Lab Results   Component Value Date/Time    Sodium,urine random 39 07/04/2022 08:00 AM    Creatinine, urine <13.00 06/29/2022 10:37 PM         Discussed with:  Pt, Daughter, Nursing     : Elli Madera MD  7/5/2022        [facility]:  [email or web address]  [place] office:  [address] [address]  [place], [address] [place] [address]  Phone: [phone]  Fax :     [phone]    [place] office:  [address] [facility], [address]  Phone - [phone]  Fax - [phone]

## 2022-07-05 NOTE — PROGRESS NOTES
1930 Bedside shift change report given to Ben (oncoming nurse) by Mar Cintron (offgoing nurse). Report included the following information SBAR, Intake/Output, MAR, Recent Results and Cardiac Rhythm NSR.     0700 Bedside shift change report given to Mar Cintron (oncoming nurse) by Ben (offgoing nurse). Report included the following information SBAR, Intake/Output, MAR, Recent Results and Cardiac Rhythm NSR. This patient was assisted with Intentional Toileting every 2 hours during this shift as appropriate. Documentation of ambulation and output reflected on Flowsheet as appropriate. Purposeful hourly rounding was completed using AIDET and 5Ps. Outcomes of PHR documented as they occurred. Bed alarm in use as appropriate. Dual Suction and ambubag in place.

## 2022-07-05 NOTE — PROGRESS NOTES
TRANSFER - IN REPORT:    Verbal report received from Vida Kwon (name) on Beladithya Plan  being received from ICU (unit) for routine progression of care      Report consisted of patients Situation, Background, Assessment and   Recommendations(SBAR). Information from the following report(s) SBAR was reviewed with the receiving nurse. Opportunity for questions and clarification was provided. Assessment completed upon patients arrival to unit and care assumed.

## 2022-07-05 NOTE — PROGRESS NOTES
Neurology Sohail Calhoun [facility]  [address] [facility], [address] [place] [address]  [phone]      Medical Progress Note      NAME: Tawanda Gonzalez   :  1960  MRM:  [id number]    Date of service: 2022  7:50 AM       Assessment and Plan:   1. Hyponatremia (2018) due to [facility] and chronic hctz use. S/p 3% NS. Nephrology following      2. Nausea and vomiting/dyspepsia (2022). likely due to gastroparesis. Now better. Cont Protonix IV, scheduled reglan PO     3. Type 2 diabetes mellitus with hyperglycemia ([address] (2022). A1C 7.9. Resume her Lantus and SSI.      4. HTN. cont exforge. dc hctz     5.  Hematuria. Hold Heparin. May need outpatient workup. Subjective:     Chief Complaint[de-identified] Patient was seen and examined as a follow up for hyponatremia. Chart was reviewed. feels well. ROS:  (bold if positive, if negative)    Tolerating PT  Tolerating Diet        Objective:     Last 24hrs VS reviewed since prior progress note.  Most recent are:    Visit Vitals  BP (!) 111/57 (BP 1 Location: Left upper arm, BP Patient Position: At rest)   Pulse 75   Temp 98.1 °F (36.7 °C)   Resp 18   Ht 4' 9.09\" (1.45 m)   Wt 74.8 kg (165 lb)   SpO2 98%   Breastfeeding No   BMI 35.60 kg/m²     SpO2 Readings from Last 6 Encounters:   22 98%   22 99%   02/10/18 99%   17 100%            Intake/Output Summary (Last 24 hours) at 2022 0750  Last data filed at 2022 2000  Gross per 24 hour   Intake 1012.08 ml   Output 1870 ml   Net -857.92 ml        Physical Exam:    Gen:  Well-developed, well-nourished, in no acute distress  HEENT:  Pink conjunctivae, PERRL, hearing intact to voice, moist mucous membranes  Neck:  Supple, without masses, thyroid non-tender  Resp:  No accessory muscle use, clear breath sounds without wheezes rales or rhonchi  Card:  No murmurs, normal S1, S2 without thrills, bruits or peripheral edema  Abd:  Soft, non-tender, non-distended, normoactive bowel sounds are present, no palpable organomegaly and no detectable hernias  Lymph:  No cervical or inguinal adenopathy  Musc:  No cyanosis or clubbing  Skin:  No rashes or ulcers, skin turgor is good  Neuro:  Cranial nerves are grossly intact, no focal motor weakness, follows commands appropriately  Psych:  Good insight, oriented to person, place and time, alert  __________________________________________________________________  Medications Reviewed: (see below)  Medications:     Current Facility-Administered Medications   Medication Dose Route Frequency    sodium chloride (NS) flush 5-40 mL  5-40 mL IntraVENous PRN    traMADoL (ULTRAM) tablet 50 mg  50 mg Oral Q6H PRN    cefTRIAXone (ROCEPHIN) 1 g in 0.9% sodium chloride 10 mL IV syringe  1 g IntraVENous Q24H    sodium chloride (NS) flush 5-40 mL  5-40 mL IntraVENous PRN    sodium chloride (NS) flush 5-40 mL  5-40 mL IntraVENous Q8H    sodium chloride (NS) flush 5-40 mL  5-40 mL IntraVENous PRN    pantoprazole (PROTONIX) tablet 40 mg  40 mg Oral ACB&D    melatonin tablet 4.5 mg  4.5 mg Oral QHS PRN    glucose chewable tablet 16 g  4 Tablet Oral PRN    glucagon (GLUCAGEN) injection 1 mg  1 mg IntraMUSCular PRN    dextrose 10% infusion 0-250 mL  0-250 mL IntraVENous PRN    simethicone (MYLICON) tablet 80 mg  80 mg Oral QID PRN    [Held by provider] heparin (porcine) injection 5,000 Units  5,000 Units SubCUTAneous Q8H    metoclopramide HCl (REGLAN) tablet 10 mg  10 mg Oral AC&HS    acetaminophen (TYLENOL) tablet 650 mg  650 mg Oral Q6H PRN    Or    acetaminophen (TYLENOL) suppository 650 mg  650 mg Rectal Q6H PRN    polyethylene glycol (MIRALAX) packet 17 g  17 g Oral DAILY PRN    ondansetron (ZOFRAN ODT) tablet 4 mg  4 mg Oral Q8H PRN    Or    ondansetron (ZOFRAN) injection 4 mg  4 mg IntraVENous Q6H PRN    ALPRAZolam (XANAX) tablet 0.25 mg  0.25 mg Oral QHS PRN    atorvastatin (LIPITOR) tablet 40 mg  40 mg Oral DAILY    [Held by provider] insulin glargine (LANTUS) injection 40 Units  40 Units SubCUTAneous QHS    insulin lispro (HUMALOG) injection   SubCUTAneous AC&HS    [Held by provider] amLODIPine/valsartan (EXFORGE) 10/320 mg   Oral DAILY        Lab Data Reviewed: (see below)  Lab Review:     Recent Labs     07/05/22  0309 07/04/22  0347 07/03/22  0040   WBC 14.3* 16.0* 14.9*   HGB 10.0* 9.5* 9.5*   HCT 27.2* 25.4* 25.3*    342 367     Recent Labs     07/05/22  0710 07/05/22  0309 07/04/22  2307 07/03/22  2121 07/03/22  1459   * 126* 126*   < > 114*   K 4.3 4.7 4.4   < > 4.7   CL 97 96* 96*   < > 83*   CO2 21 21 19*   < > 21   * 166* 215*   < > 205*   BUN 18 20 19   < > 19   CREA 0.90 0.88 0.84   < > 0.82   CA 9.3 8.9 8.6   < > 8.3*   MG  --   --   --   --  1.7   PHOS  --   --   --   --  1.5*    < > = values in this interval not displayed. Lab Results   Component Value Date/Time    Glucose (POC) 191 (H) 07/05/2022 07:33 AM    Glucose (POC) 221 (H) 07/04/2022 09:53 PM    Glucose (POC) 184 (H) 07/04/2022 04:21 PM    Glucose (POC) 171 (H) 07/04/2022 11:12 AM    Glucose (POC) 142 (H) 07/04/2022 07:32 AM     No results for input(s): PH, PCO2, PO2, HCO3, FIO2 in the last 72 hours. No results for input(s): INR, INREXT in the last 72 hours. All Micro Results     Procedure Component Value Units Date/Time    CULTURE, URINE [[id number]] Collected: 07/04/22 1756    Order Status: Completed Specimen: Urine from Clean catch Updated: 07/04/22 2310    URINE CULTURE HOLD SAMPLE [[id number]] Collected: 06/29/22 2019    Order Status: Completed Specimen: Urine from Serum Updated: 06/29/22 2029     Urine culture hold       Urine on hold in Microbiology dept for 2 days. If unpreserved urine is submitted, it cannot be used for addtional testing after 24 hours, recollection will be required. I have reviewed notes of prior 24hr. Other pertinent lab:      Total time spent with patient: 28 I personally reviewed chart, notes, data and current medications in the medical record. I have personally examined and treated the patient at bedside during this period.                  Care Plan discussed with: Patient, Nursing Staff and >50% of time spent in counseling and coordination of care    Discussed:  Care Plan    Prophylaxis:  SCD's    Disposition:  Home w/Family           ___________________________________________________    Attending Physician: Sarah Paige MD

## 2022-07-05 NOTE — PROGRESS NOTES
Overnight Nephrology Cross Cover  6p-6a    Repeat serum sodium 125, unchanged from prior  Administer another infusion 3% saline, 25 ml/hr times 2- hours, total volume 50 ml  Continue q4 sodium checks  Please call on call nephrology team with repeat results    0000: Patient repeat serum sodium 126<125 s/p 50 ml infusion of 3% saline  Will order another 25 ml/hr times 2 hours for a total volume of 50 ml  Continue q4 hour sodium checks  Please call on call nephrology team with repeat sodium results  Goal sodium 130 by morning  Discussed with primary RN    Mohsen Guerar, [facility]

## 2022-07-05 NOTE — PROGRESS NOTES
2145 Pt has orders for 3% NS. Spoke with pharmacy concerning fluids. Fluids can be run through a larger IV. ICU nurse placed ultrasound guided IV and fluids were started. 2300 NA+ level checked; 126. Notified on-call nephrologist. Orders received for 3% NS.     0400 Labs results received. NA+ 126. Notified on-call nephrologist.     0700 NA+ labs drawn. Awaiting results.

## 2022-07-05 NOTE — PROGRESS NOTES
Care Management follow up    Patient admitted for GI bleed, hyponatremia, abdominal pain, nausea/vomiting. History of: HTN, GERD, chronic neck pain, diabetes  COVID Vaccine:  Yes   type: Pfizer x 2. RUR 10 (Score %) low   Is This a Readmission NO  Is this a Bundle NO    Current status  Patient discussed during interdisciplinary rounds. Patient continues to require medical management including ongoing assessment and monitoring. 3% NS today x 1, IV antibiotics. PT recommending home follow up. Transition of Care Plan  1. Monitor patient status and response to treatment. 2. Patient continues to require medical management. 3. Independent prior to admission. 4. Home with family at [place]. 5. CM to monitor progress and recommendations.     Ernestina Yuan RN, MSN/Care manager

## 2022-07-05 NOTE — PROGRESS NOTES
Bedside and Verbal shift change report given to Willa Orellana (oncoming nurse) by Sienan Womack (offgoing nurse). Report included the following information SBAR, Kardex, MAR, Accordion and Recent Results.

## 2022-07-06 LAB
ANION GAP SERPL CALC-SCNC: 7 MMOL/L (ref 5–15)
ANION GAP SERPL CALC-SCNC: 7 MMOL/L (ref 5–15)
ANION GAP SERPL CALC-SCNC: 9 MMOL/L (ref 5–15)
ATRIAL RATE: 88 BPM
BASOPHILS # BLD: 0 K/UL (ref 0–0.1)
BASOPHILS NFR BLD: 0 % (ref 0–1)
BUN SERPL-MCNC: 22 MG/DL (ref 6–20)
BUN SERPL-MCNC: 23 MG/DL (ref 6–20)
BUN SERPL-MCNC: 23 MG/DL (ref 6–20)
BUN/CREAT SERPL: 20 (ref 12–20)
BUN/CREAT SERPL: 21 (ref 12–20)
BUN/CREAT SERPL: 23 (ref 12–20)
CALCIUM SERPL-MCNC: 8.8 MG/DL (ref 8.5–10.1)
CALCIUM SERPL-MCNC: 9 MG/DL (ref 8.5–10.1)
CALCIUM SERPL-MCNC: 9.1 MG/DL (ref 8.5–10.1)
CALCULATED P AXIS, ECG09: 61 DEGREES
CALCULATED R AXIS, ECG10: 24 DEGREES
CALCULATED T AXIS, ECG11: 44 DEGREES
CHLORIDE SERPL-SCNC: 102 MMOL/L (ref 97–108)
CHLORIDE SERPL-SCNC: 102 MMOL/L (ref 97–108)
CHLORIDE SERPL-SCNC: 103 MMOL/L (ref 97–108)
CO2 SERPL-SCNC: 20 MMOL/L (ref 21–32)
CO2 SERPL-SCNC: 20 MMOL/L (ref 21–32)
CO2 SERPL-SCNC: 25 MMOL/L (ref 21–32)
CREAT SERPL-MCNC: 0.98 MG/DL (ref 0.55–1.02)
CREAT SERPL-MCNC: 1.08 MG/DL (ref 0.55–1.02)
CREAT SERPL-MCNC: 1.12 MG/DL (ref 0.55–1.02)
DIAGNOSIS, 93000: NORMAL
DIFFERENTIAL METHOD BLD: ABNORMAL
EOSINOPHIL # BLD: 0.1 K/UL (ref 0–0.4)
EOSINOPHIL NFR BLD: 1 % (ref 0–7)
ERYTHROCYTE [DISTWIDTH] IN BLOOD BY AUTOMATED COUNT: 11.9 % (ref 11.5–14.5)
GLUCOSE BLD STRIP.AUTO-MCNC: 164 MG/DL (ref 65–117)
GLUCOSE BLD STRIP.AUTO-MCNC: 240 MG/DL (ref 65–117)
GLUCOSE BLD STRIP.AUTO-MCNC: 253 MG/DL (ref 65–117)
GLUCOSE BLD STRIP.AUTO-MCNC: 282 MG/DL (ref 65–117)
GLUCOSE SERPL-MCNC: 161 MG/DL (ref 65–100)
GLUCOSE SERPL-MCNC: 206 MG/DL (ref 65–100)
GLUCOSE SERPL-MCNC: 284 MG/DL (ref 65–100)
H PYLORI IGA SER-ACNC: 21.8 UNITS (ref 0–8.9)
H PYLORI IGG SER IA-ACNC: 3.18 INDEX VALUE (ref 0–0.79)
H PYLORI IGM SER-ACNC: 9.4 UNITS (ref 0–8.9)
HCT VFR BLD AUTO: 27.8 % (ref 35–47)
HGB BLD-MCNC: 9.9 G/DL (ref 11.5–16)
IMM GRANULOCYTES # BLD AUTO: 0 K/UL
IMM GRANULOCYTES NFR BLD AUTO: 0 %
LYMPHOCYTES # BLD: 7.1 K/UL (ref 0.8–3.5)
LYMPHOCYTES NFR BLD: 48 % (ref 12–49)
MCH RBC QN AUTO: 29.8 PG (ref 26–34)
MCHC RBC AUTO-ENTMCNC: 35.6 G/DL (ref 30–36.5)
MCV RBC AUTO: 83.7 FL (ref 80–99)
MONOCYTES # BLD: 1.2 K/UL (ref 0–1)
MONOCYTES NFR BLD: 8 % (ref 5–13)
MYELOCYTES NFR BLD MANUAL: 1 %
NEUTS SEG # BLD: 6.2 K/UL (ref 1.8–8)
NEUTS SEG NFR BLD: 42 % (ref 32–75)
NRBC # BLD: 0 K/UL (ref 0–0.01)
NRBC BLD-RTO: 0 PER 100 WBC
OSMOLALITY UR: 214 MOSM/KG H2O
P-R INTERVAL, ECG05: 164 MS
PLATELET # BLD AUTO: 370 K/UL (ref 150–400)
PLATELET COMMENTS,PCOM: ABNORMAL
PMV BLD AUTO: 8.4 FL (ref 8.9–12.9)
POTASSIUM SERPL-SCNC: 4.1 MMOL/L (ref 3.5–5.1)
POTASSIUM SERPL-SCNC: 4.1 MMOL/L (ref 3.5–5.1)
POTASSIUM SERPL-SCNC: 4.4 MMOL/L (ref 3.5–5.1)
Q-T INTERVAL, ECG07: 348 MS
QRS DURATION, ECG06: 80 MS
QTC CALCULATION (BEZET), ECG08: 421 MS
RBC # BLD AUTO: 3.32 M/UL (ref 3.8–5.2)
RBC MORPH BLD: ABNORMAL
SERVICE CMNT-IMP: ABNORMAL
SODIUM SERPL-SCNC: 129 MMOL/L (ref 136–145)
SODIUM SERPL-SCNC: 132 MMOL/L (ref 136–145)
SODIUM SERPL-SCNC: 134 MMOL/L (ref 136–145)
SODIUM UR-SCNC: 25 MMOL/L
VENTRICULAR RATE, ECG03: 88 BPM
WBC # BLD AUTO: 14.7 K/UL (ref 3.6–11)

## 2022-07-06 PROCEDURE — 74011000250 HC RX REV CODE- 250: Performed by: HOSPITALIST

## 2022-07-06 PROCEDURE — 74011250636 HC RX REV CODE- 250/636: Performed by: HOSPITALIST

## 2022-07-06 PROCEDURE — 82962 GLUCOSE BLOOD TEST: CPT

## 2022-07-06 PROCEDURE — 74011250637 HC RX REV CODE- 250/637: Performed by: INTERNAL MEDICINE

## 2022-07-06 PROCEDURE — 80048 BASIC METABOLIC PNL TOTAL CA: CPT

## 2022-07-06 PROCEDURE — 83935 ASSAY OF URINE OSMOLALITY: CPT

## 2022-07-06 PROCEDURE — 65270000046 HC RM TELEMETRY

## 2022-07-06 PROCEDURE — 74011250637 HC RX REV CODE- 250/637: Performed by: FAMILY MEDICINE

## 2022-07-06 PROCEDURE — 84300 ASSAY OF URINE SODIUM: CPT

## 2022-07-06 PROCEDURE — 36415 COLL VENOUS BLD VENIPUNCTURE: CPT

## 2022-07-06 PROCEDURE — 74011636637 HC RX REV CODE- 636/637: Performed by: INTERNAL MEDICINE

## 2022-07-06 PROCEDURE — 85025 COMPLETE CBC W/AUTO DIFF WBC: CPT

## 2022-07-06 PROCEDURE — 74011000250 HC RX REV CODE- 250: Performed by: INTERNAL MEDICINE

## 2022-07-06 RX ORDER — TOLVAPTAN 30 MG/1
30 TABLET ORAL ONCE
Status: COMPLETED | OUTPATIENT
Start: 2022-07-06 | End: 2022-07-06

## 2022-07-06 RX ADMIN — METOCLOPRAMIDE 10 MG: 10 TABLET ORAL at 16:24

## 2022-07-06 RX ADMIN — SODIUM CHLORIDE, PRESERVATIVE FREE 10 ML: 5 INJECTION INTRAVENOUS at 21:19

## 2022-07-06 RX ADMIN — Medication 5 UNITS: at 11:20

## 2022-07-06 RX ADMIN — Medication 2 UNITS: at 08:17

## 2022-07-06 RX ADMIN — METOCLOPRAMIDE 10 MG: 10 TABLET ORAL at 11:20

## 2022-07-06 RX ADMIN — TOLVAPTAN 30 MG: 30 TABLET ORAL at 11:20

## 2022-07-06 RX ADMIN — CEFTRIAXONE 1 G: 1 INJECTION, POWDER, FOR SOLUTION INTRAMUSCULAR; INTRAVENOUS at 15:10

## 2022-07-06 RX ADMIN — Medication 2 UNITS: at 21:17

## 2022-07-06 RX ADMIN — PANTOPRAZOLE SODIUM 40 MG: 40 TABLET, DELAYED RELEASE ORAL at 06:06

## 2022-07-06 RX ADMIN — METOCLOPRAMIDE 10 MG: 10 TABLET ORAL at 06:06

## 2022-07-06 RX ADMIN — METOCLOPRAMIDE 10 MG: 10 TABLET ORAL at 21:17

## 2022-07-06 RX ADMIN — ATORVASTATIN CALCIUM 40 MG: 20 TABLET, FILM COATED ORAL at 08:17

## 2022-07-06 RX ADMIN — SIMETHICONE 80 MG: 80 TABLET, CHEWABLE ORAL at 02:15

## 2022-07-06 RX ADMIN — SODIUM CHLORIDE, PRESERVATIVE FREE 10 ML: 5 INJECTION INTRAVENOUS at 06:06

## 2022-07-06 RX ADMIN — PANTOPRAZOLE SODIUM 40 MG: 40 TABLET, DELAYED RELEASE ORAL at 16:24

## 2022-07-06 RX ADMIN — Medication 5 UNITS: at 16:24

## 2022-07-06 NOTE — PROGRESS NOTES
Sohail Gimenezelsen [facility] [address]  [address] [facility], [address] [place] [address]  [phone]      Medical Progress Note      NAME: Michelle Singh   :  1960  MRM:  [id number]    Date of service: 2022  7:50 AM       Assessment and Plan:   1. Hyponatremia (2018) due to [facility] and chronic hctz use. S/p 3% NS and tolvaptan 15 mg p.o. x1. Na id 132 today. Nephrology following      2. Nausea and vomiting/dyspepsia (2022). likely due to gastroparesis. Now better. Cont Protonix IV, scheduled reglan PO     3. Type 2 diabetes mellitus with hyperglycemia ([facility] (2022). A1C 7.9. Resume Lantus and SSI.      4. HTN. cont exforge. dc hctz     5.  Hematuria. Hold Heparin. May need outpatient workup. Subjective:     Chief Complaint[de-identified] Patient was seen and examined as a follow up for hyponatremia. Chart was reviewed. feels well. ROS:  (bold if positive, if negative)    Tolerating PT  Tolerating Diet        Objective:     Last 24hrs VS reviewed since prior progress note.  Most recent are:    Visit Vitals  /60 (BP 1 Location: Left upper arm, BP Patient Position: At rest)   Pulse 68   Temp 97.9 °F (36.6 °C)   Resp 16   Ht 4' 9.09\" (1.45 m)   Wt 72.6 kg (160 lb)   SpO2 97%   Breastfeeding No   BMI 34.52 kg/m²     SpO2 Readings from Last 6 Encounters:   22 97%   22 99%   02/10/18 99%   17 100%            Intake/Output Summary (Last 24 hours) at 2022 0731  Last data filed at 2022 1157  Gross per 24 hour   Intake 330 ml   Output 700 ml   Net -370 ml        Physical Exam:    Gen:  Well-developed, well-nourished, in no acute distress  HEENT:  Pink conjunctivae, PERRL, hearing intact to voice, moist mucous membranes  Neck:  Supple, without masses, thyroid non-tender  Resp:  No accessory muscle use, clear breath sounds without wheezes rales or rhonchi  Card:  No murmurs, normal S1, S2 without thrills, bruits or peripheral edema  Abd:  Soft, non-tender, non-distended, normoactive bowel sounds are present, no palpable organomegaly and no detectable hernias  Lymph:  No cervical or inguinal adenopathy  Musc:  No cyanosis or clubbing  Skin:  No rashes or ulcers, skin turgor is good  Neuro:  Cranial nerves are grossly intact, no focal motor weakness, follows commands appropriately  Psych:  Good insight, oriented to person, place and time, alert  __________________________________________________________________  Medications Reviewed: (see below)  Medications:     Current Facility-Administered Medications   Medication Dose Route Frequency    sodium chloride (NS) flush 5-40 mL  5-40 mL IntraVENous PRN    cefTRIAXone (ROCEPHIN) 1 g in 0.9% sodium chloride 10 mL IV syringe  1 g IntraVENous Q24H    sodium chloride (NS) flush 5-40 mL  5-40 mL IntraVENous PRN    sodium chloride (NS) flush 5-40 mL  5-40 mL IntraVENous Q8H    sodium chloride (NS) flush 5-40 mL  5-40 mL IntraVENous PRN    pantoprazole (PROTONIX) tablet 40 mg  40 mg Oral ACB&D    melatonin tablet 4.5 mg  4.5 mg Oral QHS PRN    glucose chewable tablet 16 g  4 Tablet Oral PRN    glucagon (GLUCAGEN) injection 1 mg  1 mg IntraMUSCular PRN    dextrose 10% infusion 0-250 mL  0-250 mL IntraVENous PRN    simethicone (MYLICON) tablet 80 mg  80 mg Oral QID PRN    [Held by provider] heparin (porcine) injection 5,000 Units  5,000 Units SubCUTAneous Q8H    metoclopramide HCl (REGLAN) tablet 10 mg  10 mg Oral AC&HS    acetaminophen (TYLENOL) tablet 650 mg  650 mg Oral Q6H PRN    Or    acetaminophen (TYLENOL) suppository 650 mg  650 mg Rectal Q6H PRN    polyethylene glycol (MIRALAX) packet 17 g  17 g Oral DAILY PRN    ondansetron (ZOFRAN ODT) tablet 4 mg  4 mg Oral Q8H PRN    Or    ondansetron (ZOFRAN) injection 4 mg  4 mg IntraVENous Q6H PRN    ALPRAZolam (XANAX) tablet 0.25 mg  0.25 mg Oral QHS PRN    atorvastatin (LIPITOR) tablet 40 mg  40 mg Oral DAILY    [Held by provider] insulin glargine (LANTUS) injection 40 Units 40 Units SubCUTAneous QHS    insulin lispro (HUMALOG) injection   SubCUTAneous AC&HS    [Held by provider] amLODIPine/valsartan (EXFORGE) 10/320 mg   Oral DAILY        Lab Data Reviewed: (see below)  Lab Review:     Recent Labs     07/06/22  0145 07/05/22  0309 07/04/22  0347   WBC 14.7* 14.3* 16.0*   HGB 9.9* 10.0* 9.5*   HCT 27.8* 27.2* 25.4*    355 342     Recent Labs     07/06/22  0145 07/05/22  1621 07/05/22  0710 07/03/22  2121 07/03/22  1459   * 129* 128*   < > 114*   K 4.4 4.8 4.3   < > 4.7    100 97   < > 83*   CO2 20* 21 21   < > 21   * 227* 168*   < > 205*   BUN 23* 20 18   < > 19   CREA 0.98 0.98 0.90   < > 0.82   CA 8.8 9.2 9.3   < > 8.3*   MG  --   --   --   --  1.7   PHOS  --   --   --   --  1.5*    < > = values in this interval not displayed. Lab Results   Component Value Date/Time    Glucose (POC) 240 (H) 07/05/2022 08:57 PM    Glucose (POC) 231 (H) 07/05/2022 03:50 PM    Glucose (POC) 191 (H) 07/05/2022 07:33 AM    Glucose (POC) 221 (H) 07/04/2022 09:53 PM    Glucose (POC) 184 (H) 07/04/2022 04:21 PM     No results for input(s): PH, PCO2, PO2, HCO3, FIO2 in the last 72 hours. No results for input(s): INR, INREXT, INREXT in the last 72 hours. All Micro Results     Procedure Component Value Units Date/Time    CULTURE, URINE [[id number]] Collected: 07/04/22 0110    Order Status: Completed Specimen: Urine from Clean catch Updated: 07/05/22 9151     Special Requests: NO SPECIAL REQUESTS        [place] Count --        [id number]  COLONIES/mL       Culture result:       MIXED UROGENITAL KATLYN ISOLATED          URINE CULTURE HOLD SAMPLE [[id number]] Collected: 06/29/22 2019    Order Status: Completed Specimen: Urine from Serum Updated: 06/29/22 2029     Urine culture hold       Urine on hold in Microbiology dept for 2 days. If unpreserved urine is submitted, it cannot be used for addtional testing after 24 hours, recollection will be required.                 I have reviewed notes of prior 24hr. Other pertinent lab: Total time spent with patient: 28 I personally reviewed chart, notes, data and current medications in the medical record. I have personally examined and treated the patient at bedside during this period.                  Care Plan discussed with: Patient, Nursing Staff and >50% of time spent in counseling and coordination of care    Discussed:  Care Plan    Prophylaxis:  SCD's    Disposition:  Home w/Family           ___________________________________________________    Attending Physician: Kate Alatorre MD

## 2022-07-06 NOTE — PROGRESS NOTES
9633 [id number]: Nephrology notified of current sodium level of 134. No new orders received. 1900:  Bedside and Verbal shift change report given to [address] Amol Quinteros (oncoming nurse) by Ginger Farah (offgoing nurse). Report included the following information SBAR, Kardex, MAR, Accordion and Recent Results.

## 2022-07-06 NOTE — PROGRESS NOTES
1930 Bedside shift change report given to Ben (oncoming nurse) by Batsheva Cabrera (offgoing nurse). Report included the following information SBAR, Intake/Output, MAR, Recent Results and Cardiac Rhythm NSR.     0020 Patient is complaining of chest palpitations. Obtained EKG, notified RRT RN. HR 91, EKG showed NSR. Gave xanax. Will continue to monitor. Interpretor # [id number]    0700 Bedside shift change report given to Winnie Altman (oncoming nurse) by Ben (offgoing nurse). Report included the following information SBAR, Intake/Output, MAR, Recent Results and Cardiac Rhythm NSR. This patient was assisted with Intentional Toileting every 2 hours during this shift as appropriate. Documentation of ambulation and output reflected on Flowsheet as appropriate. Purposeful hourly rounding was completed using AIDET and 5Ps. Outcomes of PHR documented as they occurred. Bed alarm in use as appropriate. Dual Suction and ambubag in place.

## 2022-07-06 NOTE — PROGRESS NOTES
Problem: Falls - Risk of  Goal: *Absence of Falls  Description: Document Yefri Led Fall Risk and appropriate interventions in the flowsheet.   Outcome: Progressing Towards Goal  Note: Fall Risk Interventions:  Mobility Interventions: Bed/chair exit alarm,PT Consult for mobility concerns         Medication Interventions: Bed/chair exit alarm,Teach patient to arise slowly    Elimination Interventions: Bed/chair exit alarm,Call light in reach              Problem: Patient Education: Go to Patient Education Activity  Goal: Patient/Family Education  Outcome: Progressing Towards Goal

## 2022-07-06 NOTE — PROGRESS NOTES
Justice ROBLES. 23.  YOB: 1960          Assessment & Plan:   Hypo-osmolar hyponatremia d/t SIADH in the setting of N/V and abdominal pain, h/o thiazide diuretics  HTN> hypotension  DM-2  CP     Plan :   s/p tolvaptan 7/5 and 3% saline in the past  Repeat urine osm 294 from 7/5  I will re dose  tolvaptan 30 mg p.o. x1  Check urine sodium and osmolality today  Hold BP med's  BMP every 8 hours and please notify on-call nephrology  Do not discharge today          Subjective:   CC: HYPONATREMIA   HPI: Patient seen and examined.  129  ROS: neck pain   Current Facility-Administered Medications   Medication Dose Route Frequency    tolvaptan (SAMSCA) tablet 30 mg  30 mg Oral ONCE    sodium chloride (NS) flush 5-40 mL  5-40 mL IntraVENous PRN    cefTRIAXone (ROCEPHIN) 1 g in 0.9% sodium chloride 10 mL IV syringe  1 g IntraVENous Q24H    sodium chloride (NS) flush 5-40 mL  5-40 mL IntraVENous PRN    sodium chloride (NS) flush 5-40 mL  5-40 mL IntraVENous Q8H    sodium chloride (NS) flush 5-40 mL  5-40 mL IntraVENous PRN    pantoprazole (PROTONIX) tablet 40 mg  40 mg Oral ACB&D    melatonin tablet 4.5 mg  4.5 mg Oral QHS PRN    glucose chewable tablet 16 g  4 Tablet Oral PRN    glucagon (GLUCAGEN) injection 1 mg  1 mg IntraMUSCular PRN    dextrose 10% infusion 0-250 mL  0-250 mL IntraVENous PRN    simethicone (MYLICON) tablet 80 mg  80 mg Oral QID PRN    [Held by provider] heparin (porcine) injection 5,000 Units  5,000 Units SubCUTAneous Q8H    metoclopramide HCl (REGLAN) tablet 10 mg  10 mg Oral AC&HS    acetaminophen (TYLENOL) tablet 650 mg  650 mg Oral Q6H PRN    Or    acetaminophen (TYLENOL) suppository 650 mg  650 mg Rectal Q6H PRN    polyethylene glycol (MIRALAX) packet 17 g  17 g Oral DAILY PRN    ondansetron (ZOFRAN ODT) tablet 4 mg  4 mg Oral Q8H PRN    Or    ondansetron (ZOFRAN) injection 4 mg  4 mg IntraVENous Q6H PRN    ALPRAZolam (XANAX) tablet 0.25 mg  0.25 mg Oral QHS PRN    atorvastatin (LIPITOR) tablet 40 mg  40 mg Oral DAILY    [Held by provider] insulin glargine (LANTUS) injection 40 Units  40 Units SubCUTAneous QHS    insulin lispro (HUMALOG) injection   SubCUTAneous AC&HS    [Held by provider] amLODIPine/valsartan (EXFORGE) 10/320 mg   Oral DAILY          Objective:     Vitals:  Blood pressure 102/66, pulse 86, temperature 98.1 °F (36.7 °C), resp. rate 17, height 4' 9.09\" (1.45 m), weight 72.6 kg (160 lb), SpO2 96 %, not currently breastfeeding. Temp (24hrs), Av.1 °F (36.7 °C), Min:97.7 °F (36.5 °C), Max:98.7 °F (37.1 °C)      Intake and Output:   07 -  190  In: -   Out: 600 [Urine:600]  1901 -  0700  In: 330 [P.O.:330]  Out: 1000 [Urine:1000]    Physical Exam:                Patient is intubated:  no    Physical Examination:   GENERAL ASSESSMENT: NAD  HEENT:Nontraumatic   CHEST: CTA  HEART: S1S2  ABDOMEN: Soft,NT,  :[place]:   EXTREMITY: EDEMA - no  NEURO:Grossly non focal          ECG/rhythm:    Data Review      No results for input(s): TNIPOC in the last 72 hours. No lab exists for component: ITNL   No results for input(s): CPK, CKMB, TROIQ in the last 72 hours.   Recent Labs     22  0958 22  0145 22  1621 22  0710 22  0309 22  0949 22  0347 22  2121 22  1459   * 132* 129*   < > 126*   < > 122*   < > 114*   K 4.1 4.4 4.8   < > 4.7   < > 4.1   < > 4.7    103 100   < > 96*   < > 91*   < > 83*   CO2 20* 20* 21   < > 21   < > 21   < > 21   BUN 23* 23* 20   < > 20   < > 20   < > 19   CREA 1.08* 0.98 0.98   < > 0.88   < > 0.77   < > 0.82   * 161* 227*   < > 166*   < > 138*   < > 205*   PHOS  --   --   --   --   --   --   --   --  1.5*   MG  --   --   --   --   --   --   --   --  1.7   CA 9.1 8.8 9.2   < > 8.9   < > 8.2*   < > 8.3*   WBC  --  14.7*  --   --  14.3*  --  16.0*  --   --    HGB  --  9.9*  --   -- 10.0*  --  9.5*  --   --    HCT  --  27.8*  --   --  27.2*  --  25.4*  --   --    PLT  --  370  --   --  355  --  342  --   --     < > = values in this interval not displayed. No results for input(s): INR, PTP, APTT, INREXT, INREXT in the last 72 hours. Needs: urine analysis, urine sodium, protein and creatinine  Lab Results   Component Value Date/Time    Sodium,urine random 39 07/04/2022 08:00 AM    Creatinine, urine <13.00 06/29/2022 10:37 PM         Discussed with:  Pt, Daughter, Nursing     : Tonia Seaman MD  7/6/2022        [facility]:  [email or web address]  Vanessa Conteh office:  [address] [address]  [place], [address] [place] [address] [place]  Phone: [phone]  Fax :     [phone]    [place] office:  [address] [place]  Phone - [phone]  Fax - [phone]

## 2022-07-06 NOTE — PROGRESS NOTES
Care Management follow up     Patient admitted for GI bleed, hyponatremia, abdominal pain, nausea/vomiting. History of: HTN, GERD, chronic neck pain, diabetes  COVID Vaccine:  Yes   type: Pfizer x 2.       RUR 11 (Score %) low             Is This a Readmission NO  Is this a Bundle NO     Current status  Patient discussed during interdisciplinary rounds. Patient continues to require medical management including ongoing assessment and monitoring. Sodium level improved today, continues on IV antibiotics. Independent prior to admission.       Transition of Care Plan  1. Monitor patient status and response to treatment. 2. Patient continues to require medical management. 3. Independent prior to admission. 4. Medicaid pending. 5. Home with family at [place]. 6. CM to monitor progress and recommendations.     Ami Pastor RN, MSN/Care manager  [phone]

## 2022-07-07 VITALS
BODY MASS INDEX: 33.07 KG/M2 | RESPIRATION RATE: 18 BRPM | DIASTOLIC BLOOD PRESSURE: 72 MMHG | SYSTOLIC BLOOD PRESSURE: 132 MMHG | TEMPERATURE: 98 F | HEART RATE: 76 BPM | WEIGHT: 153.3 LBS | HEIGHT: 57 IN | OXYGEN SATURATION: 98 %

## 2022-07-07 PROBLEM — A04.8 H. PYLORI INFECTION: Status: ACTIVE | Noted: 2022-07-07

## 2022-07-07 PROBLEM — K92.2 UPPER GI BLEEDING: Status: RESOLVED | Noted: 2022-06-29 | Resolved: 2022-07-07

## 2022-07-07 PROBLEM — R11.2 NAUSEA AND VOMITING: Status: RESOLVED | Noted: 2022-06-29 | Resolved: 2022-07-07

## 2022-07-07 LAB
ANION GAP SERPL CALC-SCNC: 10 MMOL/L (ref 5–15)
ANION GAP SERPL CALC-SCNC: 11 MMOL/L (ref 5–15)
BUN SERPL-MCNC: 21 MG/DL (ref 6–20)
BUN SERPL-MCNC: 23 MG/DL (ref 6–20)
BUN/CREAT SERPL: 20 (ref 12–20)
BUN/CREAT SERPL: 21 (ref 12–20)
CALCIUM SERPL-MCNC: 9 MG/DL (ref 8.5–10.1)
CALCIUM SERPL-MCNC: 9.4 MG/DL (ref 8.5–10.1)
CHLORIDE SERPL-SCNC: 103 MMOL/L (ref 97–108)
CHLORIDE SERPL-SCNC: 104 MMOL/L (ref 97–108)
CO2 SERPL-SCNC: 21 MMOL/L (ref 21–32)
CO2 SERPL-SCNC: 22 MMOL/L (ref 21–32)
CREAT SERPL-MCNC: 1.04 MG/DL (ref 0.55–1.02)
CREAT SERPL-MCNC: 1.08 MG/DL (ref 0.55–1.02)
GLUCOSE BLD STRIP.AUTO-MCNC: 181 MG/DL (ref 65–117)
GLUCOSE BLD STRIP.AUTO-MCNC: 191 MG/DL (ref 65–117)
GLUCOSE BLD STRIP.AUTO-MCNC: 292 MG/DL (ref 65–117)
GLUCOSE SERPL-MCNC: 181 MG/DL (ref 65–100)
GLUCOSE SERPL-MCNC: 196 MG/DL (ref 65–100)
POTASSIUM SERPL-SCNC: 3.9 MMOL/L (ref 3.5–5.1)
POTASSIUM SERPL-SCNC: 4.2 MMOL/L (ref 3.5–5.1)
SERVICE CMNT-IMP: ABNORMAL
SODIUM SERPL-SCNC: 135 MMOL/L (ref 136–145)
SODIUM SERPL-SCNC: 136 MMOL/L (ref 136–145)

## 2022-07-07 PROCEDURE — 80048 BASIC METABOLIC PNL TOTAL CA: CPT

## 2022-07-07 PROCEDURE — 36415 COLL VENOUS BLD VENIPUNCTURE: CPT

## 2022-07-07 PROCEDURE — 74011250637 HC RX REV CODE- 250/637: Performed by: FAMILY MEDICINE

## 2022-07-07 PROCEDURE — 74011250637 HC RX REV CODE- 250/637: Performed by: INTERNAL MEDICINE

## 2022-07-07 PROCEDURE — 74011636637 HC RX REV CODE- 636/637: Performed by: INTERNAL MEDICINE

## 2022-07-07 PROCEDURE — 82962 GLUCOSE BLOOD TEST: CPT

## 2022-07-07 PROCEDURE — 93005 ELECTROCARDIOGRAM TRACING: CPT

## 2022-07-07 PROCEDURE — 74011000250 HC RX REV CODE- 250: Performed by: INTERNAL MEDICINE

## 2022-07-07 RX ORDER — CLARITHROMYCIN 500 MG/1
500 TABLET, FILM COATED ORAL 2 TIMES DAILY
Qty: 28 TABLET | Refills: 0 | Status: SHIPPED | OUTPATIENT
Start: 2022-07-07 | End: 2022-07-07 | Stop reason: SDUPTHER

## 2022-07-07 RX ORDER — OMEPRAZOLE 20 MG/1
20 CAPSULE, DELAYED RELEASE ORAL 2 TIMES DAILY
Qty: 28 CAPSULE | Refills: 0 | Status: SHIPPED
Start: 2022-07-07

## 2022-07-07 RX ORDER — CLARITHROMYCIN 500 MG/1
500 TABLET, FILM COATED ORAL 2 TIMES DAILY
Qty: 28 TABLET | Refills: 0 | Status: SHIPPED | OUTPATIENT
Start: 2022-07-07

## 2022-07-07 RX ORDER — AMLODIPINE BESYLATE 5 MG/1
5 TABLET ORAL DAILY
Status: DISCONTINUED | OUTPATIENT
Start: 2022-07-07 | End: 2022-07-07 | Stop reason: HOSPADM

## 2022-07-07 RX ORDER — VALSARTAN 80 MG/1
80 TABLET ORAL DAILY
Status: DISCONTINUED | OUTPATIENT
Start: 2022-07-07 | End: 2022-07-07 | Stop reason: HOSPADM

## 2022-07-07 RX ORDER — AMOXICILLIN AND CLAVULANATE POTASSIUM 562.5; 437.5; 62.5 MG/1; MG/1; MG/1
1 TABLET, FILM COATED, EXTENDED RELEASE ORAL 2 TIMES DAILY
Qty: 28 TABLET | Refills: 0 | Status: SHIPPED | OUTPATIENT
Start: 2022-07-07

## 2022-07-07 RX ORDER — AMOXICILLIN AND CLAVULANATE POTASSIUM 562.5; 437.5; 62.5 MG/1; MG/1; MG/1
1 TABLET, FILM COATED, EXTENDED RELEASE ORAL 2 TIMES DAILY
Qty: 28 TABLET | Refills: 0 | Status: SHIPPED | OUTPATIENT
Start: 2022-07-07 | End: 2022-07-07 | Stop reason: SDUPTHER

## 2022-07-07 RX ADMIN — VALSARTAN 80 MG: 80 TABLET, FILM COATED ORAL at 08:50

## 2022-07-07 RX ADMIN — SODIUM CHLORIDE, PRESERVATIVE FREE 10 ML: 5 INJECTION INTRAVENOUS at 06:08

## 2022-07-07 RX ADMIN — ALPRAZOLAM 0.25 MG: 0.25 TABLET ORAL at 00:58

## 2022-07-07 RX ADMIN — PANTOPRAZOLE SODIUM 40 MG: 40 TABLET, DELAYED RELEASE ORAL at 06:08

## 2022-07-07 RX ADMIN — ATORVASTATIN CALCIUM 40 MG: 20 TABLET, FILM COATED ORAL at 08:50

## 2022-07-07 RX ADMIN — SIMETHICONE 80 MG: 80 TABLET, CHEWABLE ORAL at 04:59

## 2022-07-07 RX ADMIN — Medication 5 UNITS: at 11:44

## 2022-07-07 RX ADMIN — Medication 2 UNITS: at 08:50

## 2022-07-07 RX ADMIN — METOCLOPRAMIDE 10 MG: 10 TABLET ORAL at 06:08

## 2022-07-07 RX ADMIN — AMLODIPINE BESYLATE 5 MG: 5 TABLET ORAL at 08:50

## 2022-07-07 RX ADMIN — METOCLOPRAMIDE 10 MG: 10 TABLET ORAL at 11:44

## 2022-07-07 NOTE — PROGRESS NOTES
0700 Bedside and Verbal shift change report given to Winnie Altman (oncoming nurse) by Ben (offgoing nurse). Report included the following information SBAR, Kardex, ED Summary, Intake/Output, MAR, Recent Results and Cardiac Rhythm NSR. This patient was assisted with Intentional Toileting every 2 hours during this shift as appropriate. Documentation of ambulation and output reflected on Flowsheet as appropriate. Purposeful hourly rounding was completed using AIDET and 5Ps. Outcomes of PHR documented as they occurred. Bed alarm in use as appropriate. Dual Suction and ambubag in place. 0800   #[id number] used for assessment and medication administration. 0900 Notified Dr. Mallika Kelsey about Pt Sodium level, ok to discharge Pt per Nephrology. 1154 Discharge paperwork to be reviewed by Molly Munguia. Pt to be discharged home transported by family.

## 2022-07-07 NOTE — PROGRESS NOTES
Discharge instructions, including educational information on new medications, were reviewed with patient and her daughter (fluent in [place]). All questions were answered. IV and heart monitor have been removed. Care Plans and Education were completed. Patient understood she needed to see her PCP and take the Summary of Care to the appointment. 2 [place] [facility] was discharged home with her family. Primary nurse was updated.

## 2022-07-07 NOTE — DISCHARGE SUMMARY
Hospitalist Discharge Summary     Patient ID:    Vani Lizarraga  [id number]  58 y.o.  1960    Admit date of service: 6/29/2022    Discharge date of service: 7/7/2022    Admission Diagnoses: Hyponatremia [E87.1]  Type 2 diabetes mellitus with hyperglycemia (HCC) [E11.65]  Nausea and vomiting [R11.2]    Chronic Diagnoses:    Problem List as of 7/7/2022 Date Reviewed: 2/7/2018          Codes Class Noted - Resolved    H. pylori infection ICD-10-CM: A04.8  ICD-9-CM: 041.86  7/7/2022 - Present        Type 2 diabetes mellitus with hyperglycemia ([facility] ICD-10-CM: E11.65  ICD-9-CM: 250.00  6/29/2022 - Present        Hypotension ICD-10-CM: I95.9  ICD-9-CM: 458.9  2/10/2018 - Present        Hyponatremia ICD-10-CM: E87.1  ICD-9-CM: 276.1  2/7/2018 - Present        Headache ICD-10-CM: R51.9  ICD-9-CM: 784.0  2/7/2018 - Present        Neck pain ICD-10-CM: M54.2  ICD-9-CM: 723.1  2/7/2018 - Present        HTN (hypertension), benign ICD-10-CM: I10  ICD-9-CM: 401.1  2/7/2018 - Present        Type 2 diabetes mellitus without complication ([facility] EWV-31-HH: E11.9  ICD-9-CM: 250.00  2/7/2018 - Present        Leukocytosis ICD-10-CM: D72.829  ICD-9-CM: 288.60  2/7/2018 - Present        RESOLVED: Nausea and vomiting ICD-10-CM: R11.2  ICD-9-CM: 787.01  6/29/2022 - 7/7/2022        RESOLVED: Upper GI bleeding ICD-10-CM: K92.2  ICD-9-CM: 578.9  6/29/2022 - 7/7/2022              Discharge Medications:   Current Discharge Medication List      START taking these medications    Details   clarithromycin (BIAXIN) 500 mg tablet Take 1 Tablet by mouth two (2) times a day. Qty: 28 Tablet, Refills: 0      amoxicillin-clavulanate (AUGMENTIN) 1,000-62.5 mg ER tablet Take 1 Tablet by mouth two (2) times a day. Qty: 28 Tablet, Refills: 0         CONTINUE these medications which have CHANGED    Details   omeprazole (PRILOSEC) 20 mg capsule Take 1 Capsule by mouth two (2) times a day.   Qty: 28 Capsule, Refills: 0         CONTINUE these medications which have NOT CHANGED    Details   atorvastatin (LIPITOR) 40 mg tablet Take 40 mg by mouth daily. insulin glargine (Lantus Solostar U-100 Insulin) 100 unit/mL (3 mL) inpn 40 Units by SubCUTAneous route nightly. hydrOXYzine HCL (ATARAX) 25 mg tablet Take 25 mg by mouth nightly as needed for Anxiety. SITagliptin (Januvia) 100 mg tablet Take 100 mg by mouth daily. ALPRAZolam (XANAX) 0.25 mg tablet Take 0.25 mg by mouth nightly as needed for Anxiety. amLODIPine-Olmesartan 5-20 mg tab Take 5-20 mg by mouth.      gabapentin (NEURONTIN) 100 mg capsule Take 100 mg by mouth nightly as needed for Pain. acetaminophen (TYLENOL) 500 mg tablet Take 2 Tablets by mouth every six (6) hours as needed for Pain or Fever. Qty: 20 Tablet, Refills: 0         STOP taking these medications       chlorthalidone (HYGROTON) 25 mg tablet Comments:   Reason for Stopping: Follow up Care:    1. Other, MD Vandana in 1-2 weeks  2. Diet:  Regular Diet    Disposition:  Home. Advanced Directive:    Discharge Exam:  See today's note. CONSULTATIONS: Nephrology    Significant Diagnostic Studies:   Recent Labs     07/06/22  0145 07/05/22  0309   WBC 14.7* 14.3*   HGB 9.9* 10.0*   HCT 27.8* 27.2*    355     Recent Labs     07/07/22  0815 07/07/22  0035 07/06/22  1732    135* 134*   K 3.9 4.2 4.1    104 102   CO2 22 21 25   BUN 21* 23* 22*   CREA 1.04* 1.08* 1.12*   * 196* 284*   CA 9.0 9.4 9.0     No results for input(s): ALT, AP, TBIL, TBILI, TP, ALB, GLOB, GGT, AML, LPSE in the last 72 hours. No lab exists for component: SGOT, GPT, AMYP, HLPSE  No results for input(s): INR, PTP, APTT, INREXT in the last 72 hours. No results for input(s): FE, TIBC, PSAT, FERR in the last 72 hours. No results for input(s): PH, PCO2, PO2 in the last 72 hours. No results for input(s): CPK, CKMB in the last 72 hours.     No lab exists for component: TROPONINI  Lab Results   Component Value Date/Time Glucose (POC) 191 (H) 07/07/2022 07:52 AM    Glucose (POC) 181 (H) 07/07/2022 12:50 AM    Glucose (POC) 240 (H) 07/06/2022 09:08 PM    Glucose (POC) 282 (H) 07/06/2022 04:06 PM    Glucose (POC) 253 (H) 07/06/2022 10:48 AM             HOSPITAL COURSE & TREATMENT RENDERED:   1.  Hyponatremia (2/7/2018) due to [facility] and chronic hctz use. S/p 3% NS and tolvaptan 15 mg p.o. x2. Na is 135 today. Nephrology following      2.  Nausea and vomiting/dyspepsia (6/29/2022).  likely due to gastroparesis. Now better.      3.  Type 2 diabetes mellitus with hyperglycemia ([facility] (6/29/2022).  A1C 7.9. Resume Lantus and SSI.      4.  HTN.  cont exforge. dc hctz     5.  Hematuria. Hold Heparin. May need outpatient workup.     6.  H.pylory AB positive. Treated with clarithromycin, PPI and amoxicillin for 14 days and FU with her PCP. Discharged in stable condition.     Spent 35 minutes    Signed:  Sylvia Gold MD  7/7/2022  11:08 AM

## 2022-07-07 NOTE — DISCHARGE INSTRUCTIONS
ACUTE DIAGNOSES:  Hyponatremia [E87.1]  Type 2 diabetes mellitus with hyperglycemia (HCC) [E11.65]  Nausea and vomiting [R11.2]    CHRONIC MEDICAL DIAGNOSES:  Problem List as of 7/7/2022 Date Reviewed: 2/7/2018          Codes Class Noted - Resolved    H. pylori infection ICD-10-CM: A04.8  ICD-9-CM: 041.86  7/7/2022 - Present        Type 2 diabetes mellitus with hyperglycemia ([facility] ICD-10-CM: E11.65  ICD-9-CM: 250.00  6/29/2022 - Present        Hypotension ICD-10-CM: I95.9  ICD-9-CM: 458.9  2/10/2018 - Present        Hyponatremia ICD-10-CM: E87.1  ICD-9-CM: 276.1  2/7/2018 - Present        Headache ICD-10-CM: R51.9  ICD-9-CM: 784.0  2/7/2018 - Present        Neck pain ICD-10-CM: M54.2  ICD-9-CM: 723.1  2/7/2018 - Present        HTN (hypertension), benign ICD-10-CM: I10  ICD-9-CM: 401.1  2/7/2018 - Present        Type 2 diabetes mellitus without complication ([facility]-IB: E11.9  ICD-9-CM: 250.00  2/7/2018 - Present        Leukocytosis ICD-10-CM: D72.829  ICD-9-CM: 288.60  2/7/2018 - Present        RESOLVED: Nausea and vomiting ICD-10-CM: R11.2  ICD-9-CM: 787.01  6/29/2022 - 7/7/2022        RESOLVED: Upper GI bleeding ICD-10-CM: K92.2  ICD-9-CM: 578.9  6/29/2022 - 7/7/2022              DISCHARGE MEDICATIONS:   {Medication reconciliation information is now added to the patient's AVS automatically when it is printed. There is no need to use this SmartLink in discharge instructions. Highlight this text and delete it to clear this message}      · It is important that you take the medication exactly as they are prescribed. · Keep your medication in the bottles provided by the pharmacist and keep a list of the medication names, dosages, and times to be taken in your wallet. · Do not take other medications without consulting your doctor.        DIET:  {diet:59218}    ACTIVITY: {discharge activity:86930}    ADDITIONAL INFORMATION: If you experience any of the following symptoms then please call your primary care physician or return to the emergency room if you cannot get hold of your doctor: Fever, chills, nausea, vomiting, diarrhea, change in mentation, falling, bleeding, shortness of breath. FOLLOW UP CARE:  Vandana Oliva, MD  you are to call and set up an appointment to see them in 5 days. Follow-up with *** in {0-10:29992} { day/week/month:64250}      Information obtained by :  I understand that if any problems occur once I am at home I am to contact my physician. I understand and acknowledge receipt of the instructions indicated above.                                                                                                                                            [de-identified] or R.N.'s Signature                                                                  Date/Time                                                                                                                                              Patient or Representative Signature                                                          Date/Time    ACUTE DIAGNOSES:  Hyponatremia [E87.1]  Type 2 diabetes mellitus with hyperglycemia (HCC) [E11.65]  Nausea and vomiting [R11.2]    CHRONIC MEDICAL DIAGNOSES:  Problem List as of 7/7/2022 Date Reviewed: 2/7/2018          Codes Class Noted - Resolved    H. pylori infection ICD-10-CM: A04.8  ICD-9-CM: 041.86  7/7/2022 - Present        Type 2 diabetes mellitus with hyperglycemia ([facility] ICD-10-CM: E11.65  ICD-9-CM: 250.00  6/29/2022 - Present        Hypotension ICD-10-CM: I95.9  ICD-9-CM: 458.9  2/10/2018 - Present        Hyponatremia ICD-10-CM: E87.1  ICD-9-CM: 276.1  2/7/2018 - Present        Headache ICD-10-CM: R51.9  ICD-9-CM: 784.0  2/7/2018 - Present        Neck pain ICD-10-CM: M54.2  ICD-9-CM: 723.1  2/7/2018 - Present        HTN (hypertension), benign ICD-10-CM: I10  ICD-9-CM: 401.1  2/7/2018 - Present        Type 2 diabetes mellitus without complication ([facility] FDM-38-CM: E11.9  ICD-9-CM: 250.00  2/7/2018 - Present        Leukocytosis ICD-10-CM: D72.829  ICD-9-CM: 288.60  2/7/2018 - Present        RESOLVED: Nausea and vomiting ICD-10-CM: R11.2  ICD-9-CM: 787.01  6/29/2022 - 7/7/2022        RESOLVED: Upper GI bleeding ICD-10-CM: K92.2  ICD-9-CM: 578.9  6/29/2022 - 7/7/2022              DISCHARGE MEDICATIONS:          · It is important that you take the medication exactly as they are prescribed. · Keep your medication in the bottles provided by the pharmacist and keep a list of the medication names, dosages, and times to be taken in your wallet. · Do not take other medications without consulting your doctor. DIET:  Regular Diet    ACTIVITY: Activity as tolerated    ADDITIONAL INFORMATION: If you experience any of the following symptoms then please call your primary care physician or return to the emergency room if you cannot get hold of your doctor: Fever, chills, nausea, vomiting, diarrhea, change in mentation, falling, bleeding, shortness of breath. FOLLOW UP CARE:  Primary care physician. you are to call and set up an appointment to see them in 5 days. Dr Hickey Eye       Information obtained by :  I understand that if any problems occur once I am at home I am to contact my physician. I understand and acknowledge receipt of the instructions indicated above.                                                                                                                                            Physician's or R.N.'s Signature                                                                  Date/Time                                                                                                                                              Patient or Representative Signature                                                          Date/Time

## 2022-07-07 NOTE — PROGRESS NOTES
CM Note:  Pt to d/c home today transported by family. No CM needs. She is to f/u with providers OP as scheduled.   MANDEEP Antoine

## 2022-07-07 NOTE — PROGRESS NOTES
Justice ROBLES. 23.  YOB: 1960          Assessment & Plan:   Hypo-osmolar hyponatremia d/t SIADH in the setting of N/V and abdominal pain, h/o thiazide diuretics  HTN  DM-2  CP     Plan :   post Tolvaptan and 3% saline  Resume Home BP med's  Stable to DC home         Subjective:   CC: HYPONATREMIA   HPI: Patient seen and examined.  136  ROS: neck pain   Current Facility-Administered Medications   Medication Dose Route Frequency    amLODIPine (NORVASC) tablet 5 mg  5 mg Oral DAILY    valsartan (DIOVAN) tablet 80 mg  80 mg Oral DAILY    sodium chloride (NS) flush 5-40 mL  5-40 mL IntraVENous PRN    cefTRIAXone (ROCEPHIN) 1 g in 0.9% sodium chloride 10 mL IV syringe  1 g IntraVENous Q24H    sodium chloride (NS) flush 5-40 mL  5-40 mL IntraVENous PRN    sodium chloride (NS) flush 5-40 mL  5-40 mL IntraVENous Q8H    sodium chloride (NS) flush 5-40 mL  5-40 mL IntraVENous PRN    pantoprazole (PROTONIX) tablet 40 mg  40 mg Oral ACB&D    melatonin tablet 4.5 mg  4.5 mg Oral QHS PRN    glucose chewable tablet 16 g  4 Tablet Oral PRN    glucagon (GLUCAGEN) injection 1 mg  1 mg IntraMUSCular PRN    dextrose 10% infusion 0-250 mL  0-250 mL IntraVENous PRN    simethicone (MYLICON) tablet 80 mg  80 mg Oral QID PRN    [Held by provider] heparin (porcine) injection 5,000 Units  5,000 Units SubCUTAneous Q8H    metoclopramide HCl (REGLAN) tablet 10 mg  10 mg Oral AC&HS    acetaminophen (TYLENOL) tablet 650 mg  650 mg Oral Q6H PRN    Or    acetaminophen (TYLENOL) suppository 650 mg  650 mg Rectal Q6H PRN    polyethylene glycol (MIRALAX) packet 17 g  17 g Oral DAILY PRN    ondansetron (ZOFRAN ODT) tablet 4 mg  4 mg Oral Q8H PRN    Or    ondansetron (ZOFRAN) injection 4 mg  4 mg IntraVENous Q6H PRN    ALPRAZolam (XANAX) tablet 0.25 mg  0.25 mg Oral QHS PRN    atorvastatin (LIPITOR) tablet 40 mg  40 mg Oral DAILY    [Held by provider] insulin glargine (LANTUS) injection 40 Units  40 Units SubCUTAneous QHS    insulin lispro (HUMALOG) injection   SubCUTAneous AC&HS          Objective:     Vitals:  Blood pressure 138/76, pulse 78, temperature 98 °F (36.7 °C), resp. rate 16, height 4' 9.09\" (1.45 m), weight 69.5 kg (153 lb 4.8 oz), SpO2 98 %, not currently breastfeeding. Temp (24hrs), Av °F (36.7 °C), Min:97.7 °F (36.5 °C), Max:98.7 °F (37.1 °C)      Intake and Output:  No intake/output data recorded.  1901 -  0700  In: 240 [P.O.:240]  Out: 800 [Urine:800]    Physical Exam:                Patient is intubated:  no    Physical Examination:   GENERAL ASSESSMENT: NAD  HEENT:Nontraumatic   CHEST: CTA  HEART: S1S2  ABDOMEN: Soft,NT,  :[place]:   EXTREMITY: EDEMA - no  NEURO:Grossly non focal          ECG/rhythm:    Data Review      No results for input(s): TNIPOC in the last 72 hours. No lab exists for component: ITNL   No results for input(s): CPK, CKMB, TROIQ in the last 72 hours. Recent Labs     22  0815 22  0035 22  1732 22  0958 22  0145 22  0710 22  0309    135* 134*   < > 132*   < > 126*   K 3.9 4.2 4.1   < > 4.4   < > 4.7    104 102   < > 103   < > 96*   CO2 22 21 25   < > 20*   < > 21   BUN 21* 23* 22*   < > 23*   < > 20   CREA 1.04* 1.08* 1.12*   < > 0.98   < > 0.88   * 196* 284*   < > 161*   < > 166*   CA 9.0 9.4 9.0   < > 8.8   < > 8.9   WBC  --   --   --   --  14.7*  --  14.3*   HGB  --   --   --   --  9.9*  --  10.0*   HCT  --   --   --   --  27.8*  --  27.2*   PLT  --   --   --   --  370  --  355    < > = values in this interval not displayed. No results for input(s): INR, PTP, APTT, INREXT, INREXT in the last 72 hours.   Needs: urine analysis, urine sodium, protein and creatinine  Lab Results   Component Value Date/Time    Sodium,urine random 25 2022 12:46 PM    Creatinine, urine <13.00 2022 10:37 PM         Discussed with:  Pt, Daughter, Nursing     : Alfonzo Miller MD  7/7/2022        [address] [place] [address] [facility]:  [email or web address]  [place] office:  [address], [address] [place] [address],[address]  [address]  Phone: [phone]  Fax :     [phone]    [address] [place] [address] office:  [address] [place] [address]  Phone - [phone]  Fax - [phone]

## 2022-07-07 NOTE — PROGRESS NOTES
Sohail Gimenezelsen [facility]  [address] [facility], [address] [place] [address]  [phone]      Medical Progress Note      NAME: Rigoberto Wooten   :  1960  MRM:  [id number]    Date of service: 2022  7:50 AM       Assessment and Plan:   1. Hyponatremia (2018) due to [facility] and chronic hctz use. S/p 3% NS and tolvaptan 15 mg p.o. x2. Na is 135 today. Nephrology following      2. Nausea and vomiting/dyspepsia (2022). likely due to gastroparesis. Now better.    3. Type 2 diabetes mellitus with hyperglycemia ([address] (2022). A1C 7.9. Resume Lantus and SSI.      4. HTN. cont exforge. dc hctz     5.  Hematuria. Hold Heparin. May need outpatient workup. Subjective:     Chief Complaint[de-identified] Patient was seen and examined as a follow up for hyponatremia. Chart was reviewed. feels well. ROS:  (bold if positive, if negative)    Tolerating PT  Tolerating Diet        Objective:     Last 24hrs VS reviewed since prior progress note.  Most recent are:    Visit Vitals  /81 (BP 1 Location: Left upper arm, BP Patient Position: At rest)   Pulse 89   Temp 97.7 °F (36.5 °C)   Resp 16   Ht 4' 9.09\" (1.45 m)   Wt 69.5 kg (153 lb 4.8 oz)   SpO2 98%   Breastfeeding No   BMI 33.07 kg/m²     SpO2 Readings from Last 6 Encounters:   22 98%   22 99%   02/10/18 99%   17 100%            Intake/Output Summary (Last 24 hours) at 2022 0743  Last data filed at 2022 1627  Gross per 24 hour   Intake 240 ml   Output 800 ml   Net -560 ml        Physical Exam:    Gen:  Well-developed, well-nourished, in no acute distress  HEENT:  Pink conjunctivae, PERRL, hearing intact to voice, moist mucous membranes  Neck:  Supple, without masses, thyroid non-tender  Resp:  No accessory muscle use, clear breath sounds without wheezes rales or rhonchi  Card:  No murmurs, normal S1, S2 without thrills, bruits or peripheral edema  Abd:  Soft, non-tender, non-distended, normoactive bowel sounds are present, no palpable organomegaly and no detectable hernias  Lymph:  No cervical or inguinal adenopathy  Musc:  No cyanosis or clubbing  Skin:  No rashes or ulcers, skin turgor is good  Neuro:  Cranial nerves are grossly intact, no focal motor weakness, follows commands appropriately  Psych:  Good insight, oriented to person, place and time, alert  __________________________________________________________________  Medications Reviewed: (see below)  Medications:     Current Facility-Administered Medications   Medication Dose Route Frequency    sodium chloride (NS) flush 5-40 mL  5-40 mL IntraVENous PRN    cefTRIAXone (ROCEPHIN) 1 g in 0.9% sodium chloride 10 mL IV syringe  1 g IntraVENous Q24H    sodium chloride (NS) flush 5-40 mL  5-40 mL IntraVENous PRN    sodium chloride (NS) flush 5-40 mL  5-40 mL IntraVENous Q8H    sodium chloride (NS) flush 5-40 mL  5-40 mL IntraVENous PRN    pantoprazole (PROTONIX) tablet 40 mg  40 mg Oral ACB&D    melatonin tablet 4.5 mg  4.5 mg Oral QHS PRN    glucose chewable tablet 16 g  4 Tablet Oral PRN    glucagon (GLUCAGEN) injection 1 mg  1 mg IntraMUSCular PRN    dextrose 10% infusion 0-250 mL  0-250 mL IntraVENous PRN    simethicone (MYLICON) tablet 80 mg  80 mg Oral QID PRN    [Held by provider] heparin (porcine) injection 5,000 Units  5,000 Units SubCUTAneous Q8H    metoclopramide HCl (REGLAN) tablet 10 mg  10 mg Oral AC&HS    acetaminophen (TYLENOL) tablet 650 mg  650 mg Oral Q6H PRN    Or    acetaminophen (TYLENOL) suppository 650 mg  650 mg Rectal Q6H PRN    polyethylene glycol (MIRALAX) packet 17 g  17 g Oral DAILY PRN    ondansetron (ZOFRAN ODT) tablet 4 mg  4 mg Oral Q8H PRN    Or    ondansetron (ZOFRAN) injection 4 mg  4 mg IntraVENous Q6H PRN    ALPRAZolam (XANAX) tablet 0.25 mg  0.25 mg Oral QHS PRN    atorvastatin (LIPITOR) tablet 40 mg  40 mg Oral DAILY    [Held by provider] insulin glargine (LANTUS) injection 40 Units  40 Units SubCUTAneous QHS  insulin lispro (HUMALOG) injection   SubCUTAneous AC&HS    amLODIPine/valsartan (EXFORGE) 10/320 mg   Oral DAILY        Lab Data Reviewed: (see below)  Lab Review:     Recent Labs     07/06/22  0145 07/05/22  0309   WBC 14.7* 14.3*   HGB 9.9* 10.0*   HCT 27.8* 27.2*    355     Recent Labs     07/07/22  0035 07/06/22  1732 07/06/22  0958   * 134* 129*   K 4.2 4.1 4.1    102 102   CO2 21 25 20*   * 284* 206*   BUN 23* 22* 23*   CREA 1.08* 1.12* 1.08*   CA 9.4 9.0 9.1     Lab Results   Component Value Date/Time    Glucose (POC) 181 (H) 07/07/2022 12:50 AM    Glucose (POC) 240 (H) 07/06/2022 09:08 PM    Glucose (POC) 282 (H) 07/06/2022 04:06 PM    Glucose (POC) 253 (H) 07/06/2022 10:48 AM    Glucose (POC) 164 (H) 07/06/2022 07:47 AM     No results for input(s): PH, PCO2, PO2, HCO3, FIO2 in the last 72 hours. No results for input(s): INR, INREXT, INREXT in the last 72 hours. All Micro Results     Procedure Component Value Units Date/Time    CULTURE, URINE [[id number]] Collected: 07/04/22 1756    Order Status: Completed Specimen: Urine from Clean catch Updated: 07/05/22 1740     Special Requests: NO SPECIAL REQUESTS        [place] Count --        [id number]  COLONIES/mL       Culture result:       MIXED UROGENITAL KATLYN ISOLATED          URINE CULTURE HOLD SAMPLE [[id number]] Collected: 06/29/22 2019    Order Status: Completed Specimen: Urine from Serum Updated: 06/29/22 2029     Urine culture hold       Urine on hold in Microbiology dept for 2 days. If unpreserved urine is submitted, it cannot be used for addtional testing after 24 hours, recollection will be required. I have reviewed notes of prior 24hr. Other pertinent lab: Total time spent with patient: 28 I personally reviewed chart, notes, data and current medications in the medical record. I have personally examined and treated the patient at bedside during this period.                  Care Plan discussed with: Patient, Nursing Staff and >50% of time spent in counseling and coordination of care    Discussed:  Care Plan    Prophylaxis:  SCD's    Disposition:  Home w/Family           ___________________________________________________    Attending Physician: Sarah Paige MD

## 2022-07-10 LAB
ATRIAL RATE: 86 BPM
CALCULATED P AXIS, ECG09: 52 DEGREES
CALCULATED R AXIS, ECG10: 32 DEGREES
CALCULATED T AXIS, ECG11: 46 DEGREES
DIAGNOSIS, 93000: NORMAL
P-R INTERVAL, ECG05: 160 MS
Q-T INTERVAL, ECG07: 350 MS
QRS DURATION, ECG06: 82 MS
QTC CALCULATION (BEZET), ECG08: 418 MS
VENTRICULAR RATE, ECG03: 86 BPM